# Patient Record
Sex: FEMALE | Race: WHITE | Employment: FULL TIME | URBAN - METROPOLITAN AREA
[De-identification: names, ages, dates, MRNs, and addresses within clinical notes are randomized per-mention and may not be internally consistent; named-entity substitution may affect disease eponyms.]

---

## 2018-11-12 LAB
AVERAGE GLUCOSE: NORMAL
AVERAGE GLUCOSE: NORMAL
HBA1C MFR BLD: 6.6 %
HBA1C MFR BLD: 6.6 %

## 2019-07-25 ENCOUNTER — OFFICE VISIT (OUTPATIENT)
Dept: PRIMARY CARE CLINIC | Age: 44
End: 2019-07-25
Payer: COMMERCIAL

## 2019-07-25 VITALS
WEIGHT: 248 LBS | HEART RATE: 95 BPM | DIASTOLIC BLOOD PRESSURE: 78 MMHG | BODY MASS INDEX: 35.5 KG/M2 | HEIGHT: 70 IN | OXYGEN SATURATION: 99 % | SYSTOLIC BLOOD PRESSURE: 124 MMHG

## 2019-07-25 DIAGNOSIS — E04.1 THYROID NODULE: ICD-10-CM

## 2019-07-25 DIAGNOSIS — D64.9 ANEMIA, UNSPECIFIED TYPE: ICD-10-CM

## 2019-07-25 DIAGNOSIS — E11.65 TYPE 2 DIABETES MELLITUS WITH HYPERGLYCEMIA, WITHOUT LONG-TERM CURRENT USE OF INSULIN (HCC): Primary | ICD-10-CM

## 2019-07-25 DIAGNOSIS — F41.9 ANXIETY AND DEPRESSION: ICD-10-CM

## 2019-07-25 DIAGNOSIS — E78.2 MIXED HYPERLIPIDEMIA: ICD-10-CM

## 2019-07-25 DIAGNOSIS — Z00.00 HEALTH MAINTENANCE EXAMINATION: ICD-10-CM

## 2019-07-25 DIAGNOSIS — E55.9 VITAMIN D DEFICIENCY: ICD-10-CM

## 2019-07-25 DIAGNOSIS — M06.4 INFLAMMATORY POLYARTHROPATHY (HCC): ICD-10-CM

## 2019-07-25 DIAGNOSIS — N20.0 CALCULUS, KIDNEY: ICD-10-CM

## 2019-07-25 DIAGNOSIS — G47.33 OSA (OBSTRUCTIVE SLEEP APNEA): ICD-10-CM

## 2019-07-25 DIAGNOSIS — K21.9 GASTROESOPHAGEAL REFLUX DISEASE WITHOUT ESOPHAGITIS: ICD-10-CM

## 2019-07-25 DIAGNOSIS — R94.5 LIVER FUNCTION STUDY, ABNORMAL: ICD-10-CM

## 2019-07-25 DIAGNOSIS — E03.9 HYPOTHYROIDISM, UNSPECIFIED TYPE: ICD-10-CM

## 2019-07-25 DIAGNOSIS — F32.A ANXIETY AND DEPRESSION: ICD-10-CM

## 2019-07-25 PROBLEM — K52.9 ACUTE GASTROENTERITIS: Status: ACTIVE | Noted: 2019-07-25

## 2019-07-25 PROCEDURE — 99215 OFFICE O/P EST HI 40 MIN: CPT | Performed by: FAMILY MEDICINE

## 2019-07-25 RX ORDER — METFORMIN HYDROCHLORIDE 500 MG/1
1000 TABLET, EXTENDED RELEASE ORAL 2 TIMES DAILY
COMMUNITY
End: 2019-07-25 | Stop reason: SDUPTHER

## 2019-07-25 RX ORDER — METFORMIN HYDROCHLORIDE 500 MG/1
1000 TABLET, EXTENDED RELEASE ORAL 2 TIMES DAILY
Qty: 120 TABLET | Refills: 3 | Status: SHIPPED | OUTPATIENT
Start: 2019-07-25 | End: 2020-01-02 | Stop reason: SDUPTHER

## 2019-07-25 RX ORDER — OMEPRAZOLE 20 MG/1
20 CAPSULE, DELAYED RELEASE ORAL DAILY
Qty: 30 CAPSULE | Refills: 3 | Status: SHIPPED | OUTPATIENT
Start: 2019-07-25 | End: 2020-01-02 | Stop reason: SDUPTHER

## 2019-07-25 RX ORDER — GLIPIZIDE 5 MG/1
5 TABLET, FILM COATED, EXTENDED RELEASE ORAL DAILY
COMMUNITY
End: 2019-07-25

## 2019-07-25 RX ORDER — OMEPRAZOLE 40 MG/1
40 CAPSULE, DELAYED RELEASE ORAL DAILY
Qty: 30 CAPSULE | Refills: 3 | Status: SHIPPED | OUTPATIENT
Start: 2019-07-25 | End: 2019-07-25

## 2019-07-25 RX ORDER — LEVOTHYROXINE SODIUM 0.07 MG/1
75 TABLET ORAL DAILY
Qty: 30 TABLET | Refills: 3 | Status: SHIPPED | OUTPATIENT
Start: 2019-07-25 | End: 2020-01-02 | Stop reason: SDUPTHER

## 2019-07-25 RX ORDER — DULOXETIN HYDROCHLORIDE 60 MG/1
60 CAPSULE, DELAYED RELEASE ORAL DAILY
Qty: 30 CAPSULE | Refills: 3 | Status: SHIPPED | OUTPATIENT
Start: 2019-07-25 | End: 2020-01-02 | Stop reason: SDUPTHER

## 2019-07-25 RX ORDER — METFORMIN HYDROCHLORIDE 500 MG/1
1000 TABLET, EXTENDED RELEASE ORAL 2 TIMES DAILY
Qty: 60 TABLET | Refills: 3 | Status: SHIPPED | OUTPATIENT
Start: 2019-07-25 | End: 2019-07-25

## 2019-07-25 RX ORDER — ACETAMINOPHEN 160 MG
2000 TABLET,DISINTEGRATING ORAL DAILY
COMMUNITY

## 2019-07-25 RX ORDER — DULOXETIN HYDROCHLORIDE 60 MG/1
60 CAPSULE, DELAYED RELEASE ORAL DAILY
COMMUNITY
End: 2019-07-25 | Stop reason: SDUPTHER

## 2019-07-25 RX ORDER — GLUCOSAMINE HCL/CHONDROITIN SU 500-400 MG
CAPSULE ORAL
Qty: 300 STRIP | Refills: 3 | Status: SHIPPED
Start: 2019-07-25 | End: 2020-04-22 | Stop reason: SDUPTHER

## 2019-07-25 NOTE — ASSESSMENT & PLAN NOTE
Counseled extensively. Differential reviewed, including serious etiologies. History of  thyroid nodule resected with \"various cells\" but ultimately deemed to be benign. She  had a thyroid ultrasound 10/18 showing \"two benign cysts\" and 2 lymph nodes \"not  overtly suspicious. She wanted to see Dr. Raysa Brown to be safe but never followed through. She says she will do so now.

## 2019-07-25 NOTE — ASSESSMENT & PLAN NOTE
Asymptomatic as long as taking therapy. She actually noticed being asymptomatic on 40 mg every other day and would like to lower to 20 mill grams daily now. However gets breakthrough off medication. Declines further testing and understands  risk of masking underlying etiologies. Risks of meds also reviewed incl RI/Electrolye  malabsorption. EGD 2011. Showed gerd .

## 2019-07-25 NOTE — ASSESSMENT & PLAN NOTE
failed mtx. did not tolerated. gi intolerance. bw neg. recommend cont per dr dick/rheumatology but declines follow-up now.   asx on cymbalta

## 2019-07-25 NOTE — PROGRESS NOTES
19  Roverto Precise : 1975 Sex: female  Age: 44 y.o. Chief Complaint   Patient presents with    Medication Refill       HPI  HPI:    Unfortunately has been unable to be compliant with fu's or diet. Lost insurance.  got sick. Due last feb. Did not get bw. Now going to get back on track. Knows a1c going to be off. Declines change in meds now. She has a number of stressors in her life, many of which have gotten better. Her  was hospitalized. It occurred shortly after getting insurance. Despite large bills insurance did kick in. He was not working for a while. Other people were supplying the groceries but now she is able to eat healthier again. Has their first family vacation planned but they had to cancel the previous vacation when her  was in the hospital.  Her mother was originally going on that medication but it was canceled. She felt bad so invited her mom on this vacation, afterward her  invited his parents who are not committing      Did not see Erin Jacobson yet. Says she will call back. Long overdue on GYN, over 10yrs. Emphasized need asap and she'll do on her own she said. Has decreased sex drive. Counseled extensively. Differential reviewed, including serious etiologies. New meds available reviewed. She'll defer to GYN  Review of Systems  ROS:  Const: Denies chills, fever, malaise and sweats. Eyes: Denies discharge, pain, redness and visual disturbance. ENMT: Denies earaches, other ear symptoms. Denies nasal or sinus symptoms other than stated  above. Denies mouth and tongue lesions and sore throat. CV: Denies chest discomfort, pain; diaphoresis, dizziness, edema, lightheadedness, orthopnea,  palpitations, syncope and near syncopal episode or any exertional symptoms  Resp: Denies cough, hemoptysis, pleuritic pain, SOB, sputum production and wheezing. GI: Denies abdominal pain, change in bowel habits, hematochezia, melena, nausea and vomiting.   : Denies urinary symptoms including dysuria , urgency, frequency or hematuria. Musculo: Denies musculoskeletal symptoms. Skin: Denies bruising and rash. Neuro: Denies headache, numbness, stiff neck, tingling and focal weakness slurred speech or facial  droop  Hema/Lymph: Denies bleeding/bruising tendency and enlarged lymph nodes        Current Outpatient Medications:     Cholecalciferol (VITAMIN D3) 2000 units CAPS, Take 2,000 Units by mouth, Disp: , Rfl:     DULoxetine (CYMBALTA) 60 MG extended release capsule, Take 1 capsule by mouth daily, Disp: 30 capsule, Rfl: 3    levothyroxine (SYNTHROID) 75 MCG tablet, Take 1 tablet by mouth Daily, Disp: 30 tablet, Rfl: 3    SITagliptin (JANUVIA) 100 MG tablet, Take 1 tablet by mouth daily, Disp: 30 tablet, Rfl: 3    blood glucose monitor strips, Test two times a day & as needed for symptoms of irregular blood glucose., Disp: 300 strip, Rfl: 3    metFORMIN (GLUCOPHAGE-XR) 500 MG extended release tablet, Take 2 tablets by mouth 2 times daily, Disp: 120 tablet, Rfl: 3    omeprazole (PRILOSEC) 20 MG delayed release capsule, Take 1 capsule by mouth daily, Disp: 30 capsule, Rfl: 3    albuterol (PROVENTIL HFA;VENTOLIN HFA) 108 (90 BASE) MCG/ACT inhaler, Inhale 2 puffs into the lungs every 6 hours as needed. , Disp: , Rfl:   Allergies   Allergen Reactions    Celecoxib Hives       Past Medical History:   Diagnosis Date    Depression     Diabetes mellitus ([address]     GERD (gastroesophageal reflux disease)     Joint pain     Thyroid disease      Past Surgical History:   Procedure Laterality Date     SECTION      CHOLECYSTECTOMY      HYSTERECTOMY       History reviewed. No pertinent family history.   Social History     Tobacco Use    Smoking status: Never Smoker    Smokeless tobacco: Never Used   Substance Use Topics    Alcohol use: No    Drug use: No      Social History     Social History Narrative    PMH:    Problem List: Eruption, Infestation by Sarcoptes are normoactive. Abdomen is soft, nontender, and nondistended. No  abdominal masses. No palpable hepatosplenomegaly. Lymph: No palpable or visible regional lymphadenopathy. Musculoskeletal: no acute joint inflammation. Skin: Dry and warm with no rash. Skin normal to inspection and palpation overall. Neuro: Alert and oriented. Affect: appropriate. Upper Extremities: 5/5 bilaterally. Lower Extremities:  5/5 bilaterally. Sensation intact to light touch. Reflexes: DTR's are symmetric and 2+ bilaterally. .  Cranial Nerves: Cranial nerves grossly intact. Assessment and Plan:   Diagnosis Orders   1. Type 2 diabetes mellitus with hyperglycemia, without long-term current use of insulin (HCC)  SITagliptin (JANUVIA) 100 MG tablet    blood glucose monitor strips    Comprehensive Metabolic Panel    Hemoglobin A1C    Urinalysis    Microalbumin / Creatinine Urine Ratio    Comprehensive Metabolic Panel    Hemoglobin A1C    Urinalysis    Microalbumin / Creatinine Urine Ratio    metFORMIN (GLUCOPHAGE-XR) 500 MG extended release tablet    DISCONTINUED: metFORMIN (GLUCOPHAGE-XR) 500 MG extended release tablet   2. Liver function study, abnormal  Comprehensive Metabolic Panel    Comprehensive Metabolic Panel   3. Thyroid nodule  levothyroxine (SYNTHROID) 75 MCG tablet    TSH without Reflex    TSH without Reflex   4. Hypothyroidism, unspecified type  levothyroxine (SYNTHROID) 75 MCG tablet    TSH without Reflex    TSH without Reflex   5. Health maintenance examination     6. Vitamin D deficiency     7. Inflammatory polyarthropathy ([address]     8. Anxiety and depression  DULoxetine (CYMBALTA) 60 MG extended release capsule   9. LEXIS (obstructive sleep apnea)     10. Anemia, unspecified type  CBC Auto Differential    CBC Auto Differential   11. Calculus, kidney     12. Mixed hyperlipidemia  Comprehensive Metabolic Panel    CK    Lipid Panel    Comprehensive Metabolic Panel    CK    Lipid Panel   13.  Gastroesophageal reflux disease without esophagitis  CBC Auto Differential    CBC Auto Differential    omeprazole (PRILOSEC) 20 MG delayed release capsule    DISCONTINUED: omeprazole (PRILOSEC) 40 MG delayed release capsule       Type 2 diabetes mellitus with hyperglycemia, without long-term current use of insulin (HCC)  Tolerating medications., On Januvia with precautions reviewed including lactic  acidosis diarrhea on metformin as well as pancreatic issues and thyroid cancer  Januvia. Was On Glucotrol with precautions including hypoglycemia. She since stopped it when ran out. Declines injections or referral now. Watch ambulatory. If out  of range, let us know. Stressed importance of daily foot examinations, regular eye  examinations etc. Micro-and macrovascular complications reviewed, hyper or  hypoglycemic precautions reviewed . cont lifestyle. Last A1C excellent 6.6, down  from 10. 2.but she expects it to be worse as she had been eating bad       Liver function study, abnormal  Counseled extensively. Differential reviewed, including serious etiologies. h/o fatty  liver. Precautions reviewed. Lifestyle modification appropriate diet and weight loss  reviewed. Risks of even this leading to cirrhosis reviewed. Other than basic  monitoring on interested in other evaluation or treatment, in-depth blood work,  imaging or otherwise. . normalized, HEP B and C neg (nonimmune and declines  immun)      Thyroid nodule  Counseled extensively. Differential reviewed, including serious etiologies. History of  thyroid nodule resected with \"various cells\" but ultimately deemed to be benign. She  had a thyroid ultrasound 10/18 showing \"two benign cysts\" and 2 lymph nodes \"not  overtly suspicious. She wanted to see Dr. Monse Torre to be safe but never followed through. She says she will do so now. Hypothyroidism  With history of partial left thyroidectomy. montior blood work. On Synthroid. Compliance with the Dr. Monse Torre referral reviewed.   Thyroid ultrasound 12/15 showed thyroid goiter , 3mm nodule without  suspicious features and partial left lobectomy . , repeat 10/18 as above. Health maintenance examination  Health maintenance issues discussed at length 9/18. Encouraged yearly physicals. Vitamin D deficiency  cont approp supplementation      Inflammatory polyarthropathy ([address]  failed mtx. did not tolerated. gi intolerance. bw neg. recommend cont per dr dick/rheumatology but declines follow-up now. asx on cymbalta      Anxiety and depression  Was doing well sertraline but because of the chronic pain we changed to Cymbalta   which is helping better with pain and she's doing well emotionally as well. LEXIS (obstructive sleep apnea)  Doing very well with CPAP. Uses 6-7 hours per night 7 days a week with great  benefit. Anemia  Chronically Low MCV, monitor. Iron and hemoglobin electrophoresis have been  normal. Counseled extensively. Differential reviewed, including serious etiologies. Declines further evaluation or treatment other than as noted. Notify me if changes  mind. Asymptomatic      Calculus, kidney  CAT scan showing nonobstructing kidney stone left, 1/16. Declines other evaluation  treatment now. Uric acid 3/16 negative. Proper hydration low oxalate diet. Declines  urology referral. asx. Mixed hyperlipidemia  higher with more nicholas/egss. declines meds. lifestyle modification reviewed. risk of  hyperlipidemia reviewed monitor  Await repeat    Gastroesophageal reflux disease without esophagitis  Asymptomatic as long as taking therapy. She actually noticed being asymptomatic on 40 mg every other day and would like to lower to 20 mill grams daily now. However gets breakthrough off medication. Declines further testing and understands  risk of masking underlying etiologies. Risks of meds also reviewed incl RI/Electrolye  malabsorption. EGD 2011. Showed gerd . No flowsheet data found. Plan as above.   Counseled extensively and differential diagnoses relevant to above were reviewed, including serious etiologies. Side effects and interactions of medications were reviewed. As long as symptoms steadily improve/resolve, and medical conditions follow the expected course, FU as below, sooner PRN. Return in about 3 months (around 10/25/2019), or if symptoms worsen or fail to improve. Signs and symptoms to watch for discussed, serious signs and symptoms reviewed. ER if any. Over 40 minutes  spent with the patient in reviewing records, reviewing with patient/family, counseling, ordering,  prescribing, completing h&p, etc., with over 50% of the time spent face to face counseling. Alejandra Hooker MD    Patients are advised to check with insurance company to ensure coverage and to fully understand benefits and cost prior to any testing. This note was created with the assistance of voice recognition software. Document was reviewed however may contain grammatical errors.

## 2019-07-26 ENCOUNTER — HOSPITAL ENCOUNTER (OUTPATIENT)
Age: 44
Discharge: HOME | End: 2019-07-28
Payer: COMMERCIAL

## 2019-07-26 DIAGNOSIS — R94.5 LIVER FUNCTION STUDY, ABNORMAL: ICD-10-CM

## 2019-07-26 DIAGNOSIS — E78.2 MIXED HYPERLIPIDEMIA: ICD-10-CM

## 2019-07-26 DIAGNOSIS — E11.65 TYPE 2 DIABETES MELLITUS WITH HYPERGLYCEMIA, WITHOUT LONG-TERM CURRENT USE OF INSULIN (HCC): ICD-10-CM

## 2019-07-26 DIAGNOSIS — K21.9 GASTROESOPHAGEAL REFLUX DISEASE WITHOUT ESOPHAGITIS: ICD-10-CM

## 2019-07-26 DIAGNOSIS — E03.9 HYPOTHYROIDISM, UNSPECIFIED TYPE: ICD-10-CM

## 2019-07-26 DIAGNOSIS — E04.1 THYROID NODULE: ICD-10-CM

## 2019-07-26 DIAGNOSIS — D64.9 ANEMIA, UNSPECIFIED TYPE: ICD-10-CM

## 2019-07-26 LAB
ALBUMIN SERPL-MCNC: 4.3 G/DL (ref 3.5–5.2)
ALP BLD-CCNC: 97 U/L (ref 35–104)
ALT SERPL-CCNC: 35 U/L (ref 0–32)
AMORPHOUS: ABNORMAL
ANION GAP SERPL CALCULATED.3IONS-SCNC: 13 MMOL/L (ref 7–16)
AST SERPL-CCNC: 26 U/L (ref 0–31)
BACTERIA: ABNORMAL /HPF
BASOPHILS ABSOLUTE: 0.04 E9/L (ref 0–0.2)
BASOPHILS RELATIVE PERCENT: 0.5 % (ref 0–2)
BILIRUB SERPL-MCNC: 0.7 MG/DL (ref 0–1.2)
BILIRUBIN URINE: NEGATIVE
BLOOD, URINE: NEGATIVE
BUN BLDV-MCNC: 10 MG/DL (ref 6–20)
CALCIUM SERPL-MCNC: 9.6 MG/DL (ref 8.6–10.2)
CHLORIDE BLD-SCNC: 100 MMOL/L (ref 98–107)
CHOLESTEROL, TOTAL: 207 MG/DL (ref 0–199)
CLARITY: ABNORMAL
CO2: 26 MMOL/L (ref 22–29)
COLOR: YELLOW
CREAT SERPL-MCNC: 0.6 MG/DL (ref 0.5–1)
CREATININE URINE: 242 MG/DL (ref 29–226)
EOSINOPHILS ABSOLUTE: 0.08 E9/L (ref 0.05–0.5)
EOSINOPHILS RELATIVE PERCENT: 0.9 % (ref 0–6)
GFR AFRICAN AMERICAN: >60
GFR NON-AFRICAN AMERICAN: >60 ML/MIN/1.73
GLUCOSE BLD-MCNC: 232 MG/DL (ref 74–99)
GLUCOSE URINE: 500 MG/DL
HBA1C MFR BLD: 9.5 % (ref 4–5.6)
HCT VFR BLD CALC: 46 % (ref 34–48)
HDLC SERPL-MCNC: 51 MG/DL
HEMOGLOBIN: 14.3 G/DL (ref 11.5–15.5)
IMMATURE GRANULOCYTES #: 0.02 E9/L
IMMATURE GRANULOCYTES %: 0.2 % (ref 0–5)
KETONES, URINE: NEGATIVE MG/DL
LDL CHOLESTEROL CALCULATED: 116 MG/DL (ref 0–99)
LEUKOCYTE ESTERASE, URINE: ABNORMAL
LYMPHOCYTES ABSOLUTE: 2.48 E9/L (ref 1.5–4)
LYMPHOCYTES RELATIVE PERCENT: 29.3 % (ref 20–42)
MCH RBC QN AUTO: 25.4 PG (ref 26–35)
MCHC RBC AUTO-ENTMCNC: 31.1 % (ref 32–34.5)
MCV RBC AUTO: 81.9 FL (ref 80–99.9)
MICROALBUMIN UR-MCNC: 15.3 MG/L
MICROALBUMIN/CREAT UR-RTO: 6.3 (ref 0–30)
MONOCYTES ABSOLUTE: 0.68 E9/L (ref 0.1–0.95)
MONOCYTES RELATIVE PERCENT: 8 % (ref 2–12)
NEUTROPHILS ABSOLUTE: 5.15 E9/L (ref 1.8–7.3)
NEUTROPHILS RELATIVE PERCENT: 61.1 % (ref 43–80)
NITRITE, URINE: POSITIVE
PDW BLD-RTO: 15.4 FL (ref 11.5–15)
PH UA: 5.5 (ref 5–9)
PLATELET # BLD: 323 E9/L (ref 130–450)
PMV BLD AUTO: 9.5 FL (ref 7–12)
POTASSIUM SERPL-SCNC: 4 MMOL/L (ref 3.5–5)
PROTEIN UA: NEGATIVE MG/DL
RBC # BLD: 5.62 E12/L (ref 3.5–5.5)
RBC UA: ABNORMAL /HPF (ref 0–2)
SODIUM BLD-SCNC: 139 MMOL/L (ref 132–146)
SPECIFIC GRAVITY UA: >=1.03 (ref 1–1.03)
TOTAL CK: 76 U/L (ref 20–180)
TOTAL PROTEIN: 7.8 G/DL (ref 6.4–8.3)
TRIGL SERPL-MCNC: 202 MG/DL (ref 0–149)
TSH SERPL DL<=0.05 MIU/L-ACNC: 1.24 UIU/ML (ref 0.27–4.2)
UROBILINOGEN, URINE: 0.2 E.U./DL
VLDLC SERPL CALC-MCNC: 40 MG/DL
WBC # BLD: 8.5 E9/L (ref 4.5–11.5)
WBC UA: ABNORMAL /HPF (ref 0–5)

## 2019-07-26 PROCEDURE — 81001 URINALYSIS AUTO W/SCOPE: CPT

## 2019-07-26 PROCEDURE — 36415 COLL VENOUS BLD VENIPUNCTURE: CPT

## 2019-07-26 PROCEDURE — 83036 HEMOGLOBIN GLYCOSYLATED A1C: CPT

## 2019-07-26 PROCEDURE — 85025 COMPLETE CBC W/AUTO DIFF WBC: CPT

## 2019-07-26 PROCEDURE — 80053 COMPREHEN METABOLIC PANEL: CPT

## 2019-07-26 PROCEDURE — 84443 ASSAY THYROID STIM HORMONE: CPT

## 2019-07-26 PROCEDURE — 82044 UR ALBUMIN SEMIQUANTITATIVE: CPT

## 2019-07-26 PROCEDURE — 82570 ASSAY OF URINE CREATININE: CPT

## 2019-07-26 PROCEDURE — 82550 ASSAY OF CK (CPK): CPT

## 2019-07-26 PROCEDURE — 80061 LIPID PANEL: CPT

## 2019-07-29 ENCOUNTER — TELEPHONE (OUTPATIENT)
Dept: PRIMARY CARE CLINIC | Age: 44
End: 2019-07-29

## 2019-07-29 DIAGNOSIS — N30.00 ACUTE CYSTITIS WITHOUT HEMATURIA: Primary | ICD-10-CM

## 2019-07-29 RX ORDER — CEPHALEXIN 500 MG/1
500 CAPSULE ORAL 2 TIMES DAILY
Qty: 14 CAPSULE | Refills: 0 | Status: SHIPPED | OUTPATIENT
Start: 2019-07-29 | End: 2019-08-05

## 2019-08-05 ENCOUNTER — TELEPHONE (OUTPATIENT)
Dept: PRIMARY CARE CLINIC | Age: 44
End: 2019-08-05

## 2019-08-05 RX ORDER — FLUCONAZOLE 150 MG/1
TABLET ORAL
Qty: 2 TABLET | Refills: 0 | Status: SHIPPED | OUTPATIENT
Start: 2019-08-05 | End: 2020-01-30

## 2019-08-24 PROBLEM — Z00.00 HEALTH MAINTENANCE EXAMINATION: Status: RESOLVED | Noted: 2019-07-25 | Resolved: 2019-08-24

## 2019-10-17 ENCOUNTER — HOSPITAL ENCOUNTER (OUTPATIENT)
Age: 44
Discharge: HOME | End: 2019-10-19
Payer: COMMERCIAL

## 2019-10-17 ENCOUNTER — APPOINTMENT (OUTPATIENT)
Dept: CT IMAGING | Age: 44
End: 2019-10-17
Payer: COMMERCIAL

## 2019-10-17 ENCOUNTER — OFFICE VISIT (OUTPATIENT)
Dept: FAMILY MEDICINE CLINIC | Age: 44
End: 2019-10-17
Payer: COMMERCIAL

## 2019-10-17 ENCOUNTER — HOSPITAL ENCOUNTER (EMERGENCY)
Age: 44
Discharge: HOME | End: 2019-10-17
Attending: EMERGENCY MEDICINE
Payer: COMMERCIAL

## 2019-10-17 VITALS
RESPIRATION RATE: 18 BRPM | WEIGHT: 249 LBS | TEMPERATURE: 97.5 F | DIASTOLIC BLOOD PRESSURE: 70 MMHG | BODY MASS INDEX: 35.73 KG/M2 | HEART RATE: 103 BPM | SYSTOLIC BLOOD PRESSURE: 108 MMHG | OXYGEN SATURATION: 98 %

## 2019-10-17 VITALS
OXYGEN SATURATION: 99 % | HEIGHT: 70 IN | RESPIRATION RATE: 20 BRPM | SYSTOLIC BLOOD PRESSURE: 115 MMHG | DIASTOLIC BLOOD PRESSURE: 72 MMHG | HEART RATE: 87 BPM | BODY MASS INDEX: 35.65 KG/M2 | TEMPERATURE: 98.3 F | WEIGHT: 249 LBS

## 2019-10-17 DIAGNOSIS — N30.00 ACUTE CYSTITIS WITHOUT HEMATURIA: ICD-10-CM

## 2019-10-17 DIAGNOSIS — E04.1 THYROID NODULE: ICD-10-CM

## 2019-10-17 DIAGNOSIS — E11.65 TYPE 2 DIABETES MELLITUS WITH HYPERGLYCEMIA, WITHOUT LONG-TERM CURRENT USE OF INSULIN (HCC): ICD-10-CM

## 2019-10-17 DIAGNOSIS — R51.9 INTRACTABLE HEADACHE, UNSPECIFIED CHRONICITY PATTERN, UNSPECIFIED HEADACHE TYPE: Primary | ICD-10-CM

## 2019-10-17 DIAGNOSIS — R51.9 NONINTRACTABLE HEADACHE, UNSPECIFIED CHRONICITY PATTERN, UNSPECIFIED HEADACHE TYPE: Primary | ICD-10-CM

## 2019-10-17 DIAGNOSIS — R94.5 LIVER FUNCTION STUDY, ABNORMAL: ICD-10-CM

## 2019-10-17 DIAGNOSIS — R42 VERTIGO: ICD-10-CM

## 2019-10-17 DIAGNOSIS — E03.9 HYPOTHYROIDISM, UNSPECIFIED TYPE: ICD-10-CM

## 2019-10-17 DIAGNOSIS — D64.9 ANEMIA, UNSPECIFIED TYPE: ICD-10-CM

## 2019-10-17 DIAGNOSIS — E78.2 MIXED HYPERLIPIDEMIA: ICD-10-CM

## 2019-10-17 DIAGNOSIS — R42 DIZZINESS: ICD-10-CM

## 2019-10-17 DIAGNOSIS — R26.9 GAIT DISTURBANCE: ICD-10-CM

## 2019-10-17 DIAGNOSIS — K21.9 GASTROESOPHAGEAL REFLUX DISEASE WITHOUT ESOPHAGITIS: ICD-10-CM

## 2019-10-17 LAB
ALBUMIN SERPL-MCNC: 3.9 G/DL (ref 3.5–5.2)
ALBUMIN SERPL-MCNC: 4.4 G/DL (ref 3.5–5.2)
ALP BLD-CCNC: 90 U/L (ref 35–104)
ALP BLD-CCNC: 96 U/L (ref 35–104)
ALT SERPL-CCNC: 38 U/L (ref 0–32)
ALT SERPL-CCNC: 38 U/L (ref 0–32)
AMORPHOUS: NORMAL
ANION GAP SERPL CALCULATED.3IONS-SCNC: 10 MMOL/L (ref 7–16)
ANION GAP SERPL CALCULATED.3IONS-SCNC: 19 MMOL/L (ref 7–16)
AST SERPL-CCNC: 30 U/L (ref 0–31)
AST SERPL-CCNC: 33 U/L (ref 0–31)
BACTERIA: NORMAL /HPF
BASOPHILS ABSOLUTE: 0.04 E9/L (ref 0–0.2)
BASOPHILS ABSOLUTE: 0.06 E9/L (ref 0–0.2)
BASOPHILS RELATIVE PERCENT: 0.4 % (ref 0–2)
BASOPHILS RELATIVE PERCENT: 0.7 % (ref 0–2)
BILIRUB SERPL-MCNC: 0.7 MG/DL (ref 0–1.2)
BILIRUB SERPL-MCNC: 0.8 MG/DL (ref 0–1.2)
BILIRUBIN URINE: NEGATIVE
BLOOD, URINE: NEGATIVE
BUN BLDV-MCNC: 7 MG/DL (ref 6–20)
BUN BLDV-MCNC: 8 MG/DL (ref 6–20)
CALCIUM SERPL-MCNC: 9.1 MG/DL (ref 8.6–10.2)
CALCIUM SERPL-MCNC: 9.7 MG/DL (ref 8.6–10.2)
CHLORIDE BLD-SCNC: 100 MMOL/L (ref 98–107)
CHLORIDE BLD-SCNC: 102 MMOL/L (ref 98–107)
CHOLESTEROL, TOTAL: 214 MG/DL (ref 0–199)
CLARITY: ABNORMAL
CO2: 19 MMOL/L (ref 22–29)
CO2: 24 MMOL/L (ref 22–29)
COLOR: YELLOW
CREAT SERPL-MCNC: 0.5 MG/DL (ref 0.5–1)
CREAT SERPL-MCNC: 0.6 MG/DL (ref 0.5–1)
CREATININE URINE: 176 MG/DL (ref 29–226)
EOSINOPHILS ABSOLUTE: 0.08 E9/L (ref 0.05–0.5)
EOSINOPHILS ABSOLUTE: 0.09 E9/L (ref 0.05–0.5)
EOSINOPHILS RELATIVE PERCENT: 0.8 % (ref 0–6)
EOSINOPHILS RELATIVE PERCENT: 1 % (ref 0–6)
GFR AFRICAN AMERICAN: >60
GFR AFRICAN AMERICAN: >60
GFR NON-AFRICAN AMERICAN: >60 ML/MIN/1.73
GFR NON-AFRICAN AMERICAN: >60 ML/MIN/1.73
GLUCOSE BLD-MCNC: 180 MG/DL (ref 74–99)
GLUCOSE BLD-MCNC: 241 MG/DL (ref 74–99)
GLUCOSE URINE: >=1000 MG/DL
HBA1C MFR BLD: 10.2 % (ref 4–5.6)
HCT VFR BLD CALC: 43.5 % (ref 34–48)
HCT VFR BLD CALC: 47.2 % (ref 34–48)
HDLC SERPL-MCNC: 56 MG/DL
HEMOGLOBIN: 13.8 G/DL (ref 11.5–15.5)
HEMOGLOBIN: 14.3 G/DL (ref 11.5–15.5)
IMMATURE GRANULOCYTES #: 0.03 E9/L
IMMATURE GRANULOCYTES #: 0.03 E9/L
IMMATURE GRANULOCYTES %: 0.3 % (ref 0–5)
IMMATURE GRANULOCYTES %: 0.3 % (ref 0–5)
KETONES, URINE: ABNORMAL MG/DL
LDL CHOLESTEROL CALCULATED: 122 MG/DL (ref 0–99)
LEUKOCYTE ESTERASE, URINE: NEGATIVE
LYMPHOCYTES ABSOLUTE: 3.61 E9/L (ref 1.5–4)
LYMPHOCYTES ABSOLUTE: 3.71 E9/L (ref 1.5–4)
LYMPHOCYTES RELATIVE PERCENT: 37.6 % (ref 20–42)
LYMPHOCYTES RELATIVE PERCENT: 40.5 % (ref 20–42)
MCH RBC QN AUTO: 24.7 PG (ref 26–35)
MCH RBC QN AUTO: 25.4 PG (ref 26–35)
MCHC RBC AUTO-ENTMCNC: 30.3 % (ref 32–34.5)
MCHC RBC AUTO-ENTMCNC: 31.7 % (ref 32–34.5)
MCV RBC AUTO: 80 FL (ref 80–99.9)
MCV RBC AUTO: 81.4 FL (ref 80–99.9)
MICROALBUMIN UR-MCNC: 20.5 MG/L
MICROALBUMIN/CREAT UR-RTO: 11.6 (ref 0–30)
MONOCYTES ABSOLUTE: 0.65 E9/L (ref 0.1–0.95)
MONOCYTES ABSOLUTE: 0.83 E9/L (ref 0.1–0.95)
MONOCYTES RELATIVE PERCENT: 7.1 % (ref 2–12)
MONOCYTES RELATIVE PERCENT: 8.7 % (ref 2–12)
NEUTROPHILS ABSOLUTE: 4.61 E9/L (ref 1.8–7.3)
NEUTROPHILS ABSOLUTE: 5 E9/L (ref 1.8–7.3)
NEUTROPHILS RELATIVE PERCENT: 50.4 % (ref 43–80)
NEUTROPHILS RELATIVE PERCENT: 52.2 % (ref 43–80)
NITRITE, URINE: NEGATIVE
PDW BLD-RTO: 15.4 FL (ref 11.5–15)
PDW BLD-RTO: 15.6 FL (ref 11.5–15)
PH UA: 6 (ref 5–9)
PLATELET # BLD: 293 E9/L (ref 130–450)
PLATELET # BLD: 350 E9/L (ref 130–450)
PMV BLD AUTO: 10 FL (ref 7–12)
PMV BLD AUTO: 9.4 FL (ref 7–12)
POTASSIUM REFLEX MAGNESIUM: 4.2 MMOL/L (ref 3.5–5)
POTASSIUM SERPL-SCNC: 4.2 MMOL/L (ref 3.5–5)
PROTEIN UA: NEGATIVE MG/DL
RBC # BLD: 5.44 E12/L (ref 3.5–5.5)
RBC # BLD: 5.8 E12/L (ref 3.5–5.5)
RBC UA: NORMAL /HPF (ref 0–2)
SODIUM BLD-SCNC: 134 MMOL/L (ref 132–146)
SODIUM BLD-SCNC: 140 MMOL/L (ref 132–146)
SPECIFIC GRAVITY UA: >=1.03 (ref 1–1.03)
TOTAL CK: 73 U/L (ref 20–180)
TOTAL PROTEIN: 7.6 G/DL (ref 6.4–8.3)
TOTAL PROTEIN: 8 G/DL (ref 6.4–8.3)
TRIGL SERPL-MCNC: 178 MG/DL (ref 0–149)
TSH SERPL DL<=0.05 MIU/L-ACNC: 1.29 UIU/ML (ref 0.27–4.2)
UROBILINOGEN, URINE: 0.2 E.U./DL
VLDLC SERPL CALC-MCNC: 36 MG/DL
WBC # BLD: 9.2 E9/L (ref 4.5–11.5)
WBC # BLD: 9.6 E9/L (ref 4.5–11.5)
WBC UA: NORMAL /HPF (ref 0–5)

## 2019-10-17 PROCEDURE — 82570 ASSAY OF URINE CREATININE: CPT

## 2019-10-17 PROCEDURE — 80053 COMPREHEN METABOLIC PANEL: CPT

## 2019-10-17 PROCEDURE — 36415 COLL VENOUS BLD VENIPUNCTURE: CPT

## 2019-10-17 PROCEDURE — 2580000003 HC RX 258: Performed by: NURSE PRACTITIONER

## 2019-10-17 PROCEDURE — 87088 URINE BACTERIA CULTURE: CPT

## 2019-10-17 PROCEDURE — 83036 HEMOGLOBIN GLYCOSYLATED A1C: CPT

## 2019-10-17 PROCEDURE — 6370000000 HC RX 637 (ALT 250 FOR IP): Performed by: NURSE PRACTITIONER

## 2019-10-17 PROCEDURE — 82550 ASSAY OF CK (CPK): CPT

## 2019-10-17 PROCEDURE — 96374 THER/PROPH/DIAG INJ IV PUSH: CPT

## 2019-10-17 PROCEDURE — 70450 CT HEAD/BRAIN W/O DYE: CPT

## 2019-10-17 PROCEDURE — 96375 TX/PRO/DX INJ NEW DRUG ADDON: CPT

## 2019-10-17 PROCEDURE — 6360000002 HC RX W HCPCS: Performed by: NURSE PRACTITIONER

## 2019-10-17 PROCEDURE — 99284 EMERGENCY DEPT VISIT MOD MDM: CPT

## 2019-10-17 PROCEDURE — 93005 ELECTROCARDIOGRAM TRACING: CPT | Performed by: NURSE PRACTITIONER

## 2019-10-17 PROCEDURE — 85025 COMPLETE CBC W/AUTO DIFF WBC: CPT

## 2019-10-17 PROCEDURE — 81001 URINALYSIS AUTO W/SCOPE: CPT

## 2019-10-17 PROCEDURE — 80061 LIPID PANEL: CPT

## 2019-10-17 PROCEDURE — 84443 ASSAY THYROID STIM HORMONE: CPT

## 2019-10-17 PROCEDURE — 99214 OFFICE O/P EST MOD 30 MIN: CPT | Performed by: PHYSICIAN ASSISTANT

## 2019-10-17 PROCEDURE — 82044 UR ALBUMIN SEMIQUANTITATIVE: CPT

## 2019-10-17 RX ORDER — KETOROLAC TROMETHAMINE 30 MG/ML
15 INJECTION, SOLUTION INTRAMUSCULAR; INTRAVENOUS ONCE
Status: COMPLETED | OUTPATIENT
Start: 2019-10-17 | End: 2019-10-17

## 2019-10-17 RX ORDER — METOCLOPRAMIDE HYDROCHLORIDE 5 MG/ML
10 INJECTION INTRAMUSCULAR; INTRAVENOUS ONCE
Status: COMPLETED | OUTPATIENT
Start: 2019-10-17 | End: 2019-10-17

## 2019-10-17 RX ORDER — 0.9 % SODIUM CHLORIDE 0.9 %
1000 INTRAVENOUS SOLUTION INTRAVENOUS ONCE
Status: COMPLETED | OUTPATIENT
Start: 2019-10-17 | End: 2019-10-17

## 2019-10-17 RX ORDER — MECLIZINE HCL 12.5 MG/1
25 TABLET ORAL ONCE
Status: COMPLETED | OUTPATIENT
Start: 2019-10-17 | End: 2019-10-17

## 2019-10-17 RX ORDER — DIPHENHYDRAMINE HYDROCHLORIDE 50 MG/ML
25 INJECTION INTRAMUSCULAR; INTRAVENOUS ONCE
Status: DISCONTINUED | OUTPATIENT
Start: 2019-10-17 | End: 2019-10-17

## 2019-10-17 RX ORDER — MECLIZINE HYDROCHLORIDE 25 MG/1
25 TABLET ORAL 3 TIMES DAILY PRN
Qty: 21 TABLET | Refills: 0 | Status: SHIPPED | OUTPATIENT
Start: 2019-10-17 | End: 2019-10-24

## 2019-10-17 RX ORDER — KETOROLAC TROMETHAMINE 30 MG/ML
15 INJECTION, SOLUTION INTRAMUSCULAR; INTRAVENOUS ONCE
Status: DISCONTINUED | OUTPATIENT
Start: 2019-10-17 | End: 2019-10-17

## 2019-10-17 RX ORDER — METHYLPREDNISOLONE SODIUM SUCCINATE 125 MG/2ML
125 INJECTION, POWDER, LYOPHILIZED, FOR SOLUTION INTRAMUSCULAR; INTRAVENOUS ONCE
Status: DISCONTINUED | OUTPATIENT
Start: 2019-10-17 | End: 2019-10-17

## 2019-10-17 RX ORDER — DIPHENHYDRAMINE HYDROCHLORIDE 50 MG/ML
25 INJECTION INTRAMUSCULAR; INTRAVENOUS ONCE
Status: COMPLETED | OUTPATIENT
Start: 2019-10-17 | End: 2019-10-17

## 2019-10-17 RX ORDER — PROCHLORPERAZINE EDISYLATE 5 MG/ML
10 INJECTION INTRAMUSCULAR; INTRAVENOUS ONCE
Status: DISCONTINUED | OUTPATIENT
Start: 2019-10-17 | End: 2019-10-17

## 2019-10-17 RX ADMIN — METOCLOPRAMIDE 10 MG: 5 INJECTION, SOLUTION INTRAMUSCULAR; INTRAVENOUS at 17:15

## 2019-10-17 RX ADMIN — MECLIZINE 25 MG: 12.5 TABLET ORAL at 19:28

## 2019-10-17 RX ADMIN — KETOROLAC TROMETHAMINE 30 MG: 30 INJECTION, SOLUTION INTRAMUSCULAR at 17:14

## 2019-10-17 RX ADMIN — SODIUM CHLORIDE 1000 ML: 9 INJECTION, SOLUTION INTRAVENOUS at 17:08

## 2019-10-17 RX ADMIN — DIPHENHYDRAMINE HYDROCHLORIDE 25 MG: 50 INJECTION, SOLUTION INTRAMUSCULAR; INTRAVENOUS at 17:17

## 2019-10-17 ASSESSMENT — PAIN DESCRIPTION - FREQUENCY: FREQUENCY: CONTINUOUS

## 2019-10-17 ASSESSMENT — PAIN SCALES - GENERAL
PAINLEVEL_OUTOF10: 8
PAINLEVEL_OUTOF10: 4
PAINLEVEL_OUTOF10: 4

## 2019-10-17 ASSESSMENT — PAIN DESCRIPTION - ONSET: ONSET: GRADUAL

## 2019-10-17 ASSESSMENT — PAIN - FUNCTIONAL ASSESSMENT: PAIN_FUNCTIONAL_ASSESSMENT: ACTIVITIES ARE NOT PREVENTED

## 2019-10-17 ASSESSMENT — PAIN DESCRIPTION - PAIN TYPE: TYPE: ACUTE PAIN

## 2019-10-17 ASSESSMENT — PAIN DESCRIPTION - LOCATION: LOCATION: HEAD

## 2019-10-17 ASSESSMENT — PAIN DESCRIPTION - PROGRESSION: CLINICAL_PROGRESSION: NOT CHANGED

## 2019-10-17 ASSESSMENT — PAIN DESCRIPTION - DESCRIPTORS: DESCRIPTORS: ACHING

## 2019-10-17 ASSESSMENT — PAIN DESCRIPTION - ORIENTATION: ORIENTATION: LEFT

## 2019-10-18 LAB
EKG ATRIAL RATE: 92 BPM
EKG P AXIS: 59 DEGREES
EKG P-R INTERVAL: 160 MS
EKG Q-T INTERVAL: 382 MS
EKG QRS DURATION: 94 MS
EKG QTC CALCULATION (BAZETT): 472 MS
EKG R AXIS: 16 DEGREES
EKG T AXIS: 57 DEGREES
EKG VENTRICULAR RATE: 92 BPM

## 2019-10-18 PROCEDURE — 93010 ELECTROCARDIOGRAM REPORT: CPT | Performed by: INTERNAL MEDICINE

## 2019-10-19 LAB — URINE CULTURE, ROUTINE: NORMAL

## 2019-11-13 ENCOUNTER — OFFICE VISIT (OUTPATIENT)
Dept: FAMILY MEDICINE CLINIC | Age: 44
End: 2019-11-13
Payer: COMMERCIAL

## 2019-11-13 ENCOUNTER — HOSPITAL ENCOUNTER (OUTPATIENT)
Age: 44
Discharge: HOME | End: 2019-11-15
Payer: COMMERCIAL

## 2019-11-13 VITALS
HEIGHT: 70 IN | DIASTOLIC BLOOD PRESSURE: 82 MMHG | BODY MASS INDEX: 36.13 KG/M2 | HEART RATE: 100 BPM | RESPIRATION RATE: 20 BRPM | TEMPERATURE: 98.2 F | OXYGEN SATURATION: 99 % | SYSTOLIC BLOOD PRESSURE: 124 MMHG | WEIGHT: 252.4 LBS

## 2019-11-13 DIAGNOSIS — R30.0 DYSURIA: ICD-10-CM

## 2019-11-13 DIAGNOSIS — N30.01 ACUTE CYSTITIS WITH HEMATURIA: ICD-10-CM

## 2019-11-13 DIAGNOSIS — R30.0 DYSURIA: Primary | ICD-10-CM

## 2019-11-13 LAB
BILIRUBIN, POC: NEGATIVE
BLOOD URINE, POC: NORMAL
CLARITY, POC: NORMAL
COLOR, POC: YELLOW
GLUCOSE URINE, POC: 500
KETONES, POC: NEGATIVE
LEUKOCYTE EST, POC: NORMAL
NITRITE, POC: NEGATIVE
PH, POC: 5.5
PROTEIN, POC: NEGATIVE
SPECIFIC GRAVITY, POC: 1.01
UROBILINOGEN, POC: 0.2

## 2019-11-13 PROCEDURE — 99214 OFFICE O/P EST MOD 30 MIN: CPT | Performed by: PHYSICIAN ASSISTANT

## 2019-11-13 PROCEDURE — 87088 URINE BACTERIA CULTURE: CPT

## 2019-11-13 PROCEDURE — 81002 URINALYSIS NONAUTO W/O SCOPE: CPT | Performed by: PHYSICIAN ASSISTANT

## 2019-11-13 PROCEDURE — 87077 CULTURE AEROBIC IDENTIFY: CPT

## 2019-11-13 PROCEDURE — 87186 SC STD MICRODIL/AGAR DIL: CPT

## 2019-11-13 RX ORDER — CEFDINIR 300 MG/1
300 CAPSULE ORAL 2 TIMES DAILY
Qty: 14 CAPSULE | Refills: 0 | Status: SHIPPED | OUTPATIENT
Start: 2019-11-13 | End: 2019-11-15 | Stop reason: ALTCHOICE

## 2019-11-15 LAB
ORGANISM: ABNORMAL
URINE CULTURE, ROUTINE: ABNORMAL

## 2019-11-15 RX ORDER — SULFAMETHOXAZOLE AND TRIMETHOPRIM 800; 160 MG/1; MG/1
1 TABLET ORAL 2 TIMES DAILY
Qty: 14 TABLET | Refills: 0 | Status: SHIPPED | OUTPATIENT
Start: 2019-11-15 | End: 2019-11-22

## 2019-11-21 ENCOUNTER — TELEPHONE (OUTPATIENT)
Dept: PRIMARY CARE CLINIC | Age: 44
End: 2019-11-21

## 2019-11-21 RX ORDER — FLUCONAZOLE 150 MG/1
150 TABLET ORAL ONCE
Qty: 1 TABLET | Refills: 0 | Status: SHIPPED | OUTPATIENT
Start: 2019-11-21 | End: 2019-11-21

## 2020-01-02 RX ORDER — METFORMIN HYDROCHLORIDE 500 MG/1
1000 TABLET, EXTENDED RELEASE ORAL 2 TIMES DAILY
Qty: 120 TABLET | Refills: 3 | Status: SHIPPED
Start: 2020-01-02 | End: 2020-04-22 | Stop reason: SDUPTHER

## 2020-01-02 RX ORDER — OMEPRAZOLE 20 MG/1
20 CAPSULE, DELAYED RELEASE ORAL DAILY
Qty: 30 CAPSULE | Refills: 3 | Status: SHIPPED
Start: 2020-01-02 | End: 2020-04-22 | Stop reason: SDUPTHER

## 2020-01-02 RX ORDER — DULOXETIN HYDROCHLORIDE 60 MG/1
60 CAPSULE, DELAYED RELEASE ORAL DAILY
Qty: 30 CAPSULE | Refills: 3 | Status: SHIPPED
Start: 2020-01-02 | End: 2020-04-22 | Stop reason: SDUPTHER

## 2020-01-02 RX ORDER — LEVOTHYROXINE SODIUM 0.07 MG/1
75 TABLET ORAL DAILY
Qty: 30 TABLET | Refills: 3 | Status: SHIPPED
Start: 2020-01-02 | End: 2020-04-22 | Stop reason: SDUPTHER

## 2020-01-30 ENCOUNTER — OFFICE VISIT (OUTPATIENT)
Dept: PRIMARY CARE CLINIC | Age: 45
End: 2020-01-30
Payer: COMMERCIAL

## 2020-01-30 VITALS
OXYGEN SATURATION: 97 % | SYSTOLIC BLOOD PRESSURE: 124 MMHG | HEART RATE: 101 BPM | DIASTOLIC BLOOD PRESSURE: 60 MMHG | BODY MASS INDEX: 36.01 KG/M2 | WEIGHT: 251 LBS

## 2020-01-30 PROBLEM — B37.31 VAGINAL CANDIDA: Status: ACTIVE | Noted: 2020-01-30

## 2020-01-30 LAB
CHP ED QC CHECK: NORMAL
GLUCOSE BLD-MCNC: 275 MG/DL
HBA1C MFR BLD: 9.9 %

## 2020-01-30 PROCEDURE — 99214 OFFICE O/P EST MOD 30 MIN: CPT | Performed by: FAMILY MEDICINE

## 2020-01-30 PROCEDURE — 82962 GLUCOSE BLOOD TEST: CPT | Performed by: FAMILY MEDICINE

## 2020-01-30 PROCEDURE — 83036 HEMOGLOBIN GLYCOSYLATED A1C: CPT | Performed by: FAMILY MEDICINE

## 2020-01-30 RX ORDER — FLUCONAZOLE 150 MG/1
TABLET ORAL
Qty: 2 TABLET | Refills: 0 | Status: SHIPPED
Start: 2020-01-30 | End: 2020-02-25

## 2020-01-30 NOTE — PROGRESS NOTES
19  Debbie Gamma : 1975 Sex: female  Age: 38 y.o. Chief Complaint   Patient presents with    Medication Refill       HPI  HPI:    Patient presents today for follow-up. Is been noncompliant including with follow-ups and lifestyle. She had blood work in July and October. ALT went from 35-38 with known fatty liver triglyceride 202 to 178  127 microalbumin 11.6 hemoglobin A1c 9.5-10.2-9.9, prior was better. Count successfully. Has not seen Dr. Sven Rivera yet. Had pneumovax    Long overdue on GYN, over 10yrs. Emphasized need asap and she'll do on her own she said. Has decreased sex drive. Counseled extensively. Differential reviewed, including serious etiologies. New meds available reviewed. She'll defer to GYN    Feel she has a yeast infection with vaginal itching.   Defers exam but would like empiric treatment      Most Recent Labs  CBC  Lab Results   Component Value Date    WBC 9.6 10/17/2019    WBC 9.2 10/17/2019    WBC 8.5 2019    RBC 5.44 10/17/2019    RBC 5.80 10/17/2019    RBC 5.62 2019    HGB 13.8 10/17/2019    HGB 14.3 10/17/2019    HGB 14.3 2019    HCT 43.5 10/17/2019    HCT 47.2 10/17/2019    HCT 46.0 2019    MCV 80.0 10/17/2019    MCV 81.4 10/17/2019    MCV 81.9 2019     10/17/2019     10/17/2019     2019      CMP  Lab Results   Component Value Date     10/17/2019     10/17/2019     2019    K 4.2 10/17/2019    K 4.2 10/17/2019    K 4.0 2019    K 4.0 01/10/2016     10/17/2019     10/17/2019     2019    CO2 24 10/17/2019    CO2 19 10/17/2019    CO2 26 2019    ANIONGAP 10 10/17/2019    ANIONGAP 19 10/17/2019    ANIONGAP 13 2019    GLUCOSE 275 2020    GLUCOSE 180 10/17/2019    GLUCOSE 241 10/17/2019    BUN 7 10/17/2019    BUN 8 10/17/2019    BUN 10 2019    CREATININE 0.5 10/17/2019    CREATININE 0.6 10/17/2019    CREATININE 0.6 2019    LABGLOM >60 10/17/2019    LABGLOM >60 10/17/2019    LABGLOM >60 07/26/2019    GFRAA >60 10/17/2019    GFRAA >60 10/17/2019    GFRAA >60 07/26/2019    CALCIUM 9.1 10/17/2019    CALCIUM 9.7 10/17/2019    CALCIUM 9.6 07/26/2019    PROT 7.6 10/17/2019    PROT 8.0 10/17/2019    PROT 7.8 07/26/2019    LABALBU 3.9 10/17/2019    LABALBU 4.4 10/17/2019    LABALBU 4.3 07/26/2019    BILITOT 0.8 10/17/2019    BILITOT 0.7 10/17/2019    BILITOT 0.7 07/26/2019    ALKPHOS 90 10/17/2019    ALKPHOS 96 10/17/2019    ALKPHOS 97 07/26/2019    AST 33 10/17/2019    AST 30 10/17/2019    AST 26 07/26/2019    ALT 38 10/17/2019    ALT 38 10/17/2019    ALT 35 07/26/2019     A1C  Lab Results   Component Value Date    LABA1C 9.9 01/30/2020    LABA1C 10.2 10/17/2019    LABA1C 9.5 07/26/2019     TSH  Lab Results   Component Value Date    TSH 1.290 10/17/2019    TSH 1.240 07/26/2019     FREET4  No results found for: T3DFRYR  LIPID  Lab Results   Component Value Date    CHOL 214 10/17/2019    CHOL 207 07/26/2019    HDL 56 10/17/2019    HDL 51 07/26/2019    LDLCALC 122 10/17/2019    LDLCALC 116 07/26/2019    TRIG 178 10/17/2019    TRIG 202 07/26/2019     VITAMIN D  No results found for: VITD25  MAGNESIUM  No results found for: MG   PHOS  No results found for: PHOS   DIANELYS   No results found for: DIANELYS  RHEUMATOID FACTOR  No results found for: RF  PSA  No results found for: PSA   HEPATITIS C  No results found for: HCVABI  HIV  No results found for: MPP1TPA, HIV1QT  UA  Lab Results   Component Value Date    COLORU Yellow 11/13/2019    COLORU Yellow 10/17/2019    COLORU Yellow 07/26/2019    COLORU Yellow 01/10/2016    CLARITYU Cloudy 11/13/2019    CLARITYU TURBID 10/17/2019    CLARITYU CLOUDY 07/26/2019    CLARITYU Clear 01/10/2016    GLUCOSEU 500 11/13/2019    GLUCOSEU >=1000 10/17/2019    GLUCOSEU 500 07/26/2019    GLUCOSEU Negative 01/10/2016    BILIRUBINUR Negative 11/13/2019    BILIRUBINUR Negative 10/17/2019    BILIRUBINUR Negative 07/26/2019    BILIRUBINUR Negative 01/10/2016    KETUA Negative 11/13/2019    KETUA TRACE 10/17/2019    KETUA Negative 07/26/2019    KETUA Negative 01/10/2016    SPECGRAV 1.015 11/13/2019    SPECGRAV >=1.030 10/17/2019    SPECGRAV >=1.030 07/26/2019    SPECGRAV >=1.030 01/10/2016    BLOODU small 11/13/2019    BLOODU Negative 10/17/2019    BLOODU Negative 07/26/2019    BLOODU Negative 01/10/2016    PHUR 5.5 11/13/2019    PHUR 6.0 10/17/2019    PHUR 5.5 07/26/2019    PHUR 5.5 01/10/2016    PROTEINU Negative 11/13/2019    PROTEINU Negative 10/17/2019    PROTEINU Negative 07/26/2019    PROTEINU Negative 01/10/2016    UROBILINOGEN 0.2 10/17/2019    UROBILINOGEN 0.2 07/26/2019    UROBILINOGEN 0.2 01/10/2016    NITRU Negative 10/17/2019    NITRU POSITIVE 07/26/2019    NITRU Negative 01/10/2016    LEUKOCYTESUR trace 11/13/2019    LEUKOCYTESUR Negative 10/17/2019    LEUKOCYTESUR SMALL 07/26/2019    LEUKOCYTESUR Negative 01/10/2016     Urine Micro/Albumin Ratio  Lab Results   Component Value Date    MALBCR 11.6 10/17/2019    MALBCR 6.3 07/26/2019       Review of Systems  ROS:  Const: Denies chills, fever, malaise and sweats. Eyes: Denies discharge, pain, redness and visual disturbance. ENMT: Denies earaches, other ear symptoms. Denies nasal or sinus symptoms other than stated  above. Denies mouth and tongue lesions and sore throat. CV: Denies chest discomfort, pain; diaphoresis, dizziness, edema, lightheadedness, orthopnea,  palpitations, syncope and near syncopal episode or any exertional symptoms  Resp: Denies cough, hemoptysis, pleuritic pain, SOB, sputum production and wheezing. GI: Denies abdominal pain, change in bowel habits, hematochezia, melena, nausea and vomiting. : Denies urinary symptoms including dysuria , urgency, frequency or hematuria. Musculo: Denies musculoskeletal symptoms. Skin: Denies bruising and rash.   Neuro: Denies headache, numbness, stiff neck, tingling and focal weakness slurred speech or facial  droop  Hema/Lymph: Denies bleeding/bruising tendency and enlarged lymph nodes        Current Outpatient Medications:     empagliflozin (JARDIANCE) 10 MG tablet, Take 1 tablet by mouth daily, Disp: 30 tablet, Rfl: 1    fluconazole (DIFLUCAN) 150 MG tablet, Take 1 po qd x 1. May repeat  x 1 in 7 days if needed, Disp: 2 tablet, Rfl: 0    DULoxetine (CYMBALTA) 60 MG extended release capsule, Take 1 capsule by mouth daily, Disp: 30 capsule, Rfl: 3    levothyroxine (SYNTHROID) 75 MCG tablet, Take 1 tablet by mouth Daily, Disp: 30 tablet, Rfl: 3    metFORMIN (GLUCOPHAGE-XR) 500 MG extended release tablet, Take 2 tablets by mouth 2 times daily, Disp: 120 tablet, Rfl: 3    omeprazole (PRILOSEC) 20 MG delayed release capsule, Take 1 capsule by mouth daily, Disp: 30 capsule, Rfl: 3    SITagliptin (JANUVIA) 100 MG tablet, Take 1 tablet by mouth daily, Disp: 30 tablet, Rfl: 3    Cholecalciferol (VITAMIN D3) 2000 units CAPS, Take 2,000 Units by mouth, Disp: , Rfl:     blood glucose monitor strips, Test two times a day & as needed for symptoms of irregular blood glucose., Disp: 300 strip, Rfl: 3    albuterol (PROVENTIL HFA;VENTOLIN HFA) 108 (90 BASE) MCG/ACT inhaler, Inhale 2 puffs into the lungs every 6 hours as needed. , Disp: , Rfl:   Allergies   Allergen Reactions    Celecoxib Hives       Past Medical History:   Diagnosis Date    Depression     Diabetes mellitus ([address]     GERD (gastroesophageal reflux disease)     Joint pain     Thyroid disease      Past Surgical History:   Procedure Laterality Date     SECTION      CHOLECYSTECTOMY      HYSTERECTOMY       No family history on file. Social History     Tobacco Use    Smoking status: Never Smoker    Smokeless tobacco: Never Used   Substance Use Topics    Alcohol use: No    Drug use: No      Social History     Patient does not qualify to have social determinant information on file (likely too young).    Social History Narrative    PMH:    Problem List: Eruption, out.    Still not taking. Declines injections . Watch ambulatory. If out  of range, let us know. Stressed importance of daily foot examinations, regular eye  examinations etc. Micro-and macrovascular complications reviewed, hyper or  hypoglycemic precautions reviewed . cont lifestyle. Last A1C excellent 6.6-9.5-10.2-9.9     Start Jardiance with precautions including UTI, Candida, Michelle's gangrene, amputation. Start 10 mg and we will titrate       Liver function study, abnormal  Counseled extensively. Differential reviewed, including serious etiologies. h/o fatty  liver. Precautions reviewed. Lifestyle modification appropriate diet and weight loss  reviewed. Risks of even this leading to cirrhosis reviewed. Other than basic  monitoring on interested in other evaluation or treatment, in-depth blood work,  imaging or otherwise. . normalized, HEP B and C neg (nonimmune and declines  immun)        Thyroid nodule  Counseled extensively. Differential reviewed, including serious etiologies. History of  thyroid nodule resected with \"various cells\" but ultimately deemed to be benign. She  had a thyroid ultrasound 10/18 showing \"two benign cysts\" and 2 lymph nodes \"not  overtly suspicious. She wanted to see Dr. Sven Rivera to be safe but never followed through. She says she will do so now.     Hypothyroidism  With history of partial left thyroidectomy. montior blood work. On Synthroid. Compliance with the Dr. Sven Rivera referral reviewed. Thyroid ultrasound 12/15 showed thyroid goiter , 3mm nodule without  suspicious features and partial left lobectomy . , repeat 10/18 as above.        Health maintenance examination  Health maintenance issues discussed at length 9/18. Encouraged yearly physicals.        Vitamin D deficiency  cont approp supplementation        Inflammatory polyarthropathy ([address]  failed mtx. did not tolerated. gi intolerance. bw neg. recommend cont per dr dick/rheumatology but declines follow-up now.   asx on cymbalta        Anxiety and depression  Was doing well sertraline but because of the chronic pain we changed to Cymbalta   which is helping better with pain and she's doing well with both this and the emotion    LEXIS (obstructive sleep apnea)  Doing very well with CPAP. Uses 6-7 hours per night 7 days a week with great  benefit.        Anemia  Chronically Low MCV, monitor. Iron and hemoglobin electrophoresis have been  normal.  MCV since normalized. counseled extensively. Differential reviewed, including serious etiologies. Declines further evaluation or treatment other than as noted. Notify me if changes  mind. Asymptomatic        Calculus, kidney  CAT scan showing nonobstructing kidney stone left, 1/16. Declines other evaluation  treatment now. Uric acid 3/16 negative. Proper hydration low oxalate diet. Declines  urology referral. asx.        Mixed hyperlipidemia  Goals reviewed. Declines meds despite R/B especially in relation to diabetes. Lifestyle modification reviewed. risk of  hyperlipidemia reviewed monitor  Await repeat     Gastroesophageal reflux disease without esophagitis  Asymptomatic as long as taking therapy. She actually noticed being asymptomatic on 40 mg every other day and would like to lower to 20 mill grams daily now. However gets breakthrough off medication. Declines further testing and understands  risk of masking underlying etiologies. Risks of meds also reviewed incl RI/Electrolye  malabsorption. EGD 2011. Showed gerd          No flowsheet data found. Plan as above. Counseled extensively and differential diagnoses relevant to above were reviewed, including serious etiologies. Side effects and interactions of medications were reviewed. My concerns reviewed. Defers baseline labs, hemoglobin A1c and glucose checked today as above. Start Jardiance with precautions. Refer back to Dr. Leon Smith. Blood work 3 weeks follow-up 4-week sooner as needed.  diflucan          As long as symptoms steadily improve/resolve, and medical conditions follow the expected course, FU as below, sooner PRN. Return in about 4 weeks (around 2/27/2020), or if symptoms worsen or fail to improve, for Review BW, Multiple. Signs and symptoms to watch for discussed, serious signs and symptoms reviewed. ER if any. Over 40 minutes  spent with the patient in reviewing records, reviewing with patient/family, counseling, ordering,  prescribing, completing h&p, etc., with over 50% of the time spent face to face counseling. Marine Maher MD    Patients are advised to check with insurance company to ensure coverage and to fully understand benefits and cost prior to any testing. This note was created with the assistance of voice recognition software. Document was reviewed however may contain grammatical errors.

## 2020-02-24 LAB
ALBUMIN SERPL-MCNC: 4.2 G/DL
ALP BLD-CCNC: 91 U/L
ALT SERPL-CCNC: 49 U/L
ANION GAP SERPL CALCULATED.3IONS-SCNC: NORMAL MMOL/L
AST SERPL-CCNC: 38 U/L
BILIRUB SERPL-MCNC: 0.9 MG/DL (ref 0.1–1.4)
BILIRUBIN, URINE: NEGATIVE
BLOOD, URINE: NEGATIVE
BUN BLDV-MCNC: 12 MG/DL
CALCIUM SERPL-MCNC: 8.9 MG/DL
CHLORIDE BLD-SCNC: 104 MMOL/L
CHOLESTEROL, TOTAL: 240 MG/DL
CHOLESTEROL/HDL RATIO: 3.9
CLARITY: ABNORMAL
CO2: 25 MMOL/L
COLOR: YELLOW
CREAT SERPL-MCNC: 0.66 MG/DL
CREATININE, URINE: 15
GFR CALCULATED: 108
GLUCOSE BLD-MCNC: 253 MG/DL
GLUCOSE URINE: ABNORMAL
HDLC SERPL-MCNC: 61 MG/DL (ref 35–70)
KETONES, URINE: ABNORMAL
LDL CHOLESTEROL CALCULATED: 152 MG/DL (ref 0–160)
LEUKOCYTE ESTERASE, URINE: NEGATIVE
MICROALBUMIN/CREAT 24H UR: 1.8 MG/G{CREAT}
MICROALBUMIN/CREAT UR-RTO: 118
NITRITE, URINE: NEGATIVE
PH UA: ABNORMAL
POTASSIUM SERPL-SCNC: 4.1 MMOL/L
PROTEIN UA: NEGATIVE
SODIUM BLD-SCNC: 139 MMOL/L
SPECIFIC GRAVITY, URINE: ABNORMAL
T4 FREE: 1.3
TOTAL PROTEIN: 7.1
TRIGL SERPL-MCNC: 142 MG/DL
TSH SERPL DL<=0.05 MIU/L-ACNC: 0.72 UIU/ML
UROBILINOGEN, URINE: ABNORMAL
VLDLC SERPL CALC-MCNC: NORMAL MG/DL

## 2020-02-25 ENCOUNTER — OFFICE VISIT (OUTPATIENT)
Dept: PRIMARY CARE CLINIC | Age: 45
End: 2020-02-25
Payer: COMMERCIAL

## 2020-02-25 VITALS
HEART RATE: 89 BPM | SYSTOLIC BLOOD PRESSURE: 128 MMHG | WEIGHT: 249 LBS | BODY MASS INDEX: 35.73 KG/M2 | OXYGEN SATURATION: 97 % | DIASTOLIC BLOOD PRESSURE: 72 MMHG

## 2020-02-25 PROBLEM — L02.214 ABSCESS OF GROIN, LEFT: Status: ACTIVE | Noted: 2020-02-25

## 2020-02-25 LAB — HBA1C MFR BLD: 9.7 %

## 2020-02-25 PROCEDURE — 90746 HEPB VACCINE 3 DOSE ADULT IM: CPT | Performed by: FAMILY MEDICINE

## 2020-02-25 PROCEDURE — 90471 IMMUNIZATION ADMIN: CPT | Performed by: FAMILY MEDICINE

## 2020-02-25 PROCEDURE — 99215 OFFICE O/P EST HI 40 MIN: CPT | Performed by: FAMILY MEDICINE

## 2020-02-25 PROCEDURE — 90472 IMMUNIZATION ADMIN EACH ADD: CPT | Performed by: FAMILY MEDICINE

## 2020-02-25 PROCEDURE — 83036 HEMOGLOBIN GLYCOSYLATED A1C: CPT | Performed by: FAMILY MEDICINE

## 2020-02-25 PROCEDURE — 90632 HEPA VACCINE ADULT IM: CPT | Performed by: FAMILY MEDICINE

## 2020-02-25 RX ORDER — FLUCONAZOLE 150 MG/1
TABLET ORAL
Qty: 2 TABLET | Refills: 0 | Status: SHIPPED
Start: 2020-02-25 | End: 2020-05-13 | Stop reason: ALTCHOICE

## 2020-02-25 RX ORDER — DOXYCYCLINE HYCLATE 100 MG
100 TABLET ORAL 2 TIMES DAILY
Qty: 20 TABLET | Refills: 0 | Status: SHIPPED | OUTPATIENT
Start: 2020-02-25 | End: 2020-03-06

## 2020-02-25 NOTE — ASSESSMENT & PLAN NOTE
Counseled. At this point wishes to move forward with doxycycline with precautions including C. difficile, risk benefits of probiotic reviewed, Bactroban with precautions. Declines procedural intervention.

## 2020-02-25 NOTE — PROGRESS NOTES
19  Kimmy Pink : 1975 Sex: female  Age: 38 y.o. Chief Complaint   Patient presents with    Discuss Labs       HPI  HPI:    Patient presents today for follow-up. Overall feels well, tolerating the Jardiance 10 mg, found to discount card. She was given some mild intermittent yeast infections before the Jardiance, asks for Diflucan to have on hand especially for going on an antibiotic. She is noticed a boil in the left groin that is gotten somewhat larger and somewhat tender, no drainage fever or chills. No other complaints or concerns. Sees Dr. Saad Rivera in May    Glucose is finally improving, 159 this morning, hemoglobin A1c went from 10.2-9.7. Willing to increase Jardiance to 25 mg. Microalbumin creatinine ratio negative, LDL went from 1 22-1 52 and she simply wants monitored at this point, triglyceride 142 HDL 61, glucose was 253 on 2020. Again she states she is noticed noticeable improvement over the past few days. AST 38, ALT 49 with history of fatty liver TSH 0.72, free T4 1.3, trace ketones 3+ glucose and bacteria in the urine few, asymptomatic. Long overdue on GYN, over 10yrs.  Emphasized need asap and she'll do on her own              Most Recent Labs  CBC  Lab Results   Component Value Date    WBC 9.6 10/17/2019    WBC 9.2 10/17/2019    WBC 8.5 2019    RBC 5.44 10/17/2019    RBC 5.80 10/17/2019    RBC 5.62 2019    HGB 13.8 10/17/2019    HGB 14.3 10/17/2019    HGB 14.3 2019    HCT 43.5 10/17/2019    HCT 47.2 10/17/2019    HCT 46.0 2019    MCV 80.0 10/17/2019    MCV 81.4 10/17/2019    MCV 81.9 2019     10/17/2019     10/17/2019     2019      CMP  Lab Results   Component Value Date     10/17/2019     10/17/2019     2019    K 4.2 10/17/2019    K 4.2 10/17/2019    K 4.0 2019    K 4.0 01/10/2016     10/17/2019     10/17/2019     2019    CO2 24 10/17/2019    CO2 19 10/17/2019    CO2 26 07/26/2019    ANIONGAP 10 10/17/2019    ANIONGAP 19 10/17/2019    ANIONGAP 13 07/26/2019    GLUCOSE 275 01/30/2020    GLUCOSE 180 10/17/2019    GLUCOSE 241 10/17/2019    BUN 7 10/17/2019    BUN 8 10/17/2019    BUN 10 07/26/2019    CREATININE 0.5 10/17/2019    CREATININE 0.6 10/17/2019    CREATININE 0.6 07/26/2019    LABGLOM >60 10/17/2019    LABGLOM >60 10/17/2019    LABGLOM >60 07/26/2019    GFRAA >60 10/17/2019    GFRAA >60 10/17/2019    GFRAA >60 07/26/2019    CALCIUM 9.1 10/17/2019    CALCIUM 9.7 10/17/2019    CALCIUM 9.6 07/26/2019    PROT 7.6 10/17/2019    PROT 8.0 10/17/2019    PROT 7.8 07/26/2019    LABALBU 3.9 10/17/2019    LABALBU 4.4 10/17/2019    LABALBU 4.3 07/26/2019    BILITOT 0.8 10/17/2019    BILITOT 0.7 10/17/2019    BILITOT 0.7 07/26/2019    ALKPHOS 90 10/17/2019    ALKPHOS 96 10/17/2019    ALKPHOS 97 07/26/2019    AST 33 10/17/2019    AST 30 10/17/2019    AST 26 07/26/2019    ALT 38 10/17/2019    ALT 38 10/17/2019    ALT 35 07/26/2019     A1C  Lab Results   Component Value Date    LABA1C 9.7 02/25/2020    LABA1C 9.9 01/30/2020    LABA1C 10.2 10/17/2019     TSH  Lab Results   Component Value Date    TSH 1.290 10/17/2019    TSH 1.240 07/26/2019     FREET4  No results found for: W9VULUD  LIPID  Lab Results   Component Value Date    CHOL 214 10/17/2019    CHOL 207 07/26/2019    HDL 56 10/17/2019    HDL 51 07/26/2019    LDLCALC 122 10/17/2019    LDLCALC 116 07/26/2019    TRIG 178 10/17/2019    TRIG 202 07/26/2019     VITAMIN D  No results found for: VITD25  MAGNESIUM  No results found for: MG   PHOS  No results found for: PHOS   DIANELYS   No results found for: DIANELYS  RHEUMATOID FACTOR  No results found for: RF  PSA  No results found for: PSA   HEPATITIS C  No results found for: HCVABI  HIV  No results found for: KMJ7KLX, HIV1QT  UA  Lab Results   Component Value Date    COLORU Yellow 11/13/2019    COLORU Yellow 10/17/2019    COLORU Yellow 07/26/2019    COLORU Yellow 01/10/2016 abdominal pain, change in bowel habits, hematochezia, melena, nausea and vomiting. : Denies urinary symptoms including dysuria , urgency, frequency or hematuria. Musculo: Denies musculoskeletal symptoms. Skin: Denies bruising and rash other than as above. Neuro: Denies headache, numbness, stiff neck, tingling and focal weakness slurred speech or facial  droop  Hema/Lymph: Denies bleeding/bruising tendency and enlarged lymph nodes        Current Outpatient Medications:     empagliflozin (JARDIANCE) 25 MG tablet, Take 25 mg by mouth daily, Disp: 30 tablet, Rfl: 3    fluconazole (DIFLUCAN) 150 MG tablet, Take 1 po qd x 1 at onset of symptoms. Repeat  x 1 after course of antibiotic, Disp: 2 tablet, Rfl: 0    doxycycline hyclate (VIBRA-TABS) 100 MG tablet, Take 1 tablet by mouth 2 times daily for 10 days, Disp: 20 tablet, Rfl: 0    mupirocin (BACTROBAN) 2 % ointment, Apply 3 times daily x 10 days, Disp: 30 g, Rfl: 0    DULoxetine (CYMBALTA) 60 MG extended release capsule, Take 1 capsule by mouth daily, Disp: 30 capsule, Rfl: 3    levothyroxine (SYNTHROID) 75 MCG tablet, Take 1 tablet by mouth Daily, Disp: 30 tablet, Rfl: 3    metFORMIN (GLUCOPHAGE-XR) 500 MG extended release tablet, Take 2 tablets by mouth 2 times daily, Disp: 120 tablet, Rfl: 3    omeprazole (PRILOSEC) 20 MG delayed release capsule, Take 1 capsule by mouth daily, Disp: 30 capsule, Rfl: 3    SITagliptin (JANUVIA) 100 MG tablet, Take 1 tablet by mouth daily, Disp: 30 tablet, Rfl: 3    Cholecalciferol (VITAMIN D3) 2000 units CAPS, Take 2,000 Units by mouth, Disp: , Rfl:     blood glucose monitor strips, Test two times a day & as needed for symptoms of irregular blood glucose., Disp: 300 strip, Rfl: 3    albuterol (PROVENTIL HFA;VENTOLIN HFA) 108 (90 BASE) MCG/ACT inhaler, Inhale 2 puffs into the lungs every 6 hours as needed. , Disp: , Rfl:   Allergies   Allergen Reactions    Celecoxib Hives       Past Medical History:   Diagnosis Date Sclerae clear. ENMT: Auditory canals normal. Tympanic membranes: intact and translucent. External nose WNL. Nasal mucosa is clear. Oropharynx: No erythema or exudate. Posterior pharynx is normal.  Neck: Supple. Palpation reveals no adenopathy. No masses appreciated. No JVD. Carotids: no  bruits. Resp: Respirations are unlabored. Clear to auscultation. No rales, rhonchi or wheezes appreciated  over the lungs bilaterally. CV: Rate is regular. Rhythm is regular. No gallop or rubs. No heart murmur appreciated. Extremities: No clubbing, cyanosis, or edema. No calf inflammation or tenderness. Abdomen: Bowel sounds are normoactive. Abdomen is soft, nontender, and nondistended. No  abdominal masses. No palpable hepatosplenomegaly. Lymph: No palpable or visible regional lymphadenopathy. Musculoskeletal: no acute joint inflammation. Skin: Dry and warm with no rash. She does have a small cyst minimally inflamed left upper leg, no surrounding redness erythema or induration. She would like treatment as below. Declines procedural intervention unless it continues or worsens. Neuro: Alert and oriented. Affect: appropriate. Upper Extremities: 5/5 bilaterally. Lower Extremities:  5/5 bilaterally. Sensation intact to light touch. Reflexes: DTR's are symmetric and 2+ bilaterally. .  Cranial Nerves: Cranial nerves grossly intact. Assessment and Plan:   Diagnosis Orders   1. Type 2 diabetes mellitus with hyperglycemia, without long-term current use of insulin ([facility])  POCT glycosylated hemoglobin (Hb A1C)    empagliflozin (JARDIANCE) 25 MG tablet    Comprehensive Metabolic Panel    Urinalysis    Microalbumin / Creatinine Urine Ratio    Hemoglobin A1C   2. Vaginal candida  fluconazole (DIFLUCAN) 150 MG tablet   3. Liver function study, abnormal  Comprehensive Metabolic Panel    Hep A Vaccine Adult (VAQTA)    Hep B Vaccine Adult (ENGERIX B)   4. Thyroid nodule  TSH without Reflex    T4, Free   5.  Hypothyroidism, unspecified type  TSH without Reflex    T4, Free   6. Vitamin D deficiency     7. Inflammatory polyarthropathy ([address]     8. Anxiety and depression     9. LEXIS (obstructive sleep apnea)     10. Anemia, unspecified type  CBC Auto Differential   11. Calculus, kidney     12. Mixed hyperlipidemia  Lipid Panel    CK   13. Gastroesophageal reflux disease without esophagitis     14. Health maintenance examination     15. Abscess of groin, left  doxycycline hyclate (VIBRA-TABS) 100 MG tablet    mupirocin (BACTROBAN) 2 % ointment       Vaginal candida  Counseled. Empiric Diflucan with precautions. Declines exam.     Type 2 diabetes mellitus with hyperglycemia, without long-term current use of insulin (HCC)  Tolerating medications., On Bethany Kobs with precautions reviewed including lactic  acidosis diarrhea, on Januvia as well precautions including pancreatic issues and thyroid cancer   Was On Glucotrol with precautions including hypoglycemia. She since stopped it when ran out. Still not taking. Declines injections . Watch ambulatory. If out  of range, let us know. Stressed importance of daily foot examinations, regular eye  examinations etc. Micro-and macrovascular complications reviewed, hyper or  hypoglycemic precautions reviewed . cont lifestyle. Last A1C excellent 6.6-9.5-10.2-9.9 -9.7    Last time Jardiance was started  with precautions including UTI, Candida, Michelle's gangrene, amputation. ,  Overall tolerating well and she will increase to 25 mg daily. Planning to see Dr. Moe Beltran in May.     Liver function study, abnormal  Counseled extensively. Differential reviewed, including serious etiologies. h/o fatty  liver. Precautions reviewed. Lifestyle modification appropriate diet and weight loss  reviewed. Risks of even this leading to cirrhosis reviewed. Other than basic  monitoring on interested in other evaluation or treatment, in-depth blood work,  imaging or otherwise.  . normalized, HEP B and C neg (nonimmune and now willing to start hepatitis A and B vaccine series)       Thyroid nodule  Counseled extensively. Differential reviewed, including serious etiologies. History of  thyroid nodule resected with \"various cells\" but ultimately deemed to be benign. She  had a thyroid ultrasound 10/18 showing \"two benign cysts\" and 2 lymph nodes \"not  overtly suspicious. She is going to see Dr. Marci Dozier in May and defers further evaluation and treatment to him. Hypothyroidism  With history of partial left thyroidectomy. montior blood work. On Synthroid. And to see Dr. Marci Dozier in May. Thyroid ultrasound 12/15 showed thyroid goiter , 3mm nodule without  suspicious features and partial left lobectomy . , repeat 10/18 as above. TFTs stable.        Health maintenance examination  Health maintenance issues discussed at length 9/18. Encouraged yearly physicals. Importance and what this entails reviewed.        Vitamin D deficiency  cont approp supplementation        Inflammatory polyarthropathy ([address]  failed mtx. did not tolerated. gi intolerance. bw neg. recommend cont per dr dick/rheumatology but declines follow-up now. asx on cymbalta        Anxiety and depression  Was doing well sertraline but because of the chronic pain we changed to Cymbalta   which is helping better with pain and she's doing well with both this and the emotion    LEXIS (obstructive sleep apnea)  Doing very well with CPAP. Uses 6-7 hours per night 7 days a week with great  benefit.        Anemia  Chronically Low MCV, monitor. Iron and hemoglobin electrophoresis have been  normal.  MCV since normalized. counseled extensively. Differential reviewed, including serious etiologies. Declines further evaluation or treatment other than as noted. Notify me if changes  mind. Asymptomatic. Monitor        Calculus, kidney  CAT scan showing nonobstructing kidney stone left, 1/16. Declines other evaluation  treatment now. Uric acid 3/16 negative.  Proper hydration low oxalate

## 2020-03-26 PROBLEM — Z00.00 HEALTH MAINTENANCE EXAMINATION: Status: RESOLVED | Noted: 2019-07-25 | Resolved: 2020-03-26

## 2020-04-22 ENCOUNTER — VIRTUAL VISIT (OUTPATIENT)
Dept: PRIMARY CARE CLINIC | Age: 45
End: 2020-04-22
Payer: COMMERCIAL

## 2020-04-22 PROBLEM — J40 BRONCHITIS: Status: ACTIVE | Noted: 2020-04-22

## 2020-04-22 PROCEDURE — 99214 OFFICE O/P EST MOD 30 MIN: CPT | Performed by: FAMILY MEDICINE

## 2020-04-22 RX ORDER — FLUCONAZOLE 150 MG/1
TABLET ORAL
Qty: 2 TABLET | Refills: 0 | Status: SHIPPED
Start: 2020-04-22 | End: 2020-05-13 | Stop reason: ALTCHOICE

## 2020-04-22 RX ORDER — LEVOTHYROXINE SODIUM 0.07 MG/1
75 TABLET ORAL DAILY
Qty: 90 TABLET | Refills: 3 | Status: SHIPPED
Start: 2020-04-22 | End: 2020-09-01 | Stop reason: SDUPTHER

## 2020-04-22 RX ORDER — OMEPRAZOLE 20 MG/1
20 CAPSULE, DELAYED RELEASE ORAL DAILY
Qty: 90 CAPSULE | Refills: 3 | Status: SHIPPED | OUTPATIENT
Start: 2020-04-22

## 2020-04-22 RX ORDER — ALBUTEROL SULFATE 90 UG/1
2 AEROSOL, METERED RESPIRATORY (INHALATION) EVERY 4 HOURS PRN
Qty: 1 INHALER | Refills: 1 | Status: SHIPPED
Start: 2020-04-22 | End: 2020-06-16 | Stop reason: ALTCHOICE

## 2020-04-22 RX ORDER — GLUCOSAMINE HCL/CHONDROITIN SU 500-400 MG
CAPSULE ORAL
Qty: 300 STRIP | Refills: 3 | Status: SHIPPED
Start: 2020-04-22 | End: 2020-06-03 | Stop reason: CLARIF

## 2020-04-22 RX ORDER — DULOXETIN HYDROCHLORIDE 60 MG/1
60 CAPSULE, DELAYED RELEASE ORAL DAILY
Qty: 90 CAPSULE | Refills: 3 | Status: SHIPPED
Start: 2020-04-22 | End: 2020-11-19

## 2020-04-22 RX ORDER — METFORMIN HYDROCHLORIDE 500 MG/1
1000 TABLET, EXTENDED RELEASE ORAL 2 TIMES DAILY
Qty: 360 TABLET | Refills: 3 | Status: SHIPPED
Start: 2020-04-22 | End: 2021-04-26 | Stop reason: SDUPTHER

## 2020-04-22 RX ORDER — EMPAGLIFLOZIN 25 MG/1
25 TABLET, FILM COATED ORAL DAILY
Qty: 90 TABLET | Refills: 3 | Status: SHIPPED
Start: 2020-04-22 | End: 2020-05-13

## 2020-04-22 NOTE — PROGRESS NOTES
Thyroid nodule  levothyroxine (SYNTHROID) 75 MCG tablet   6. Hypothyroidism, unspecified type  levothyroxine (SYNTHROID) 75 MCG tablet   7. Bronchitis     8. Liver function study, abnormal     9. Vitamin D deficiency     10. Inflammatory polyarthropathy ([address]     11. LEXIS (obstructive sleep apnea)     12. Anemia, unspecified type     13. Calculus, kidney     14. Mixed hyperlipidemia     15. Abscess of groin, left         Bronchitis  Symptoms concerning for Covid-19. Risk of complications with her comorbidities reviewed. She is on Z-Ruiz now with precautions including prolonged QT and C. difficile. Risk benefits of probiotic reviewed. Albuterol as directed as needed. Declines to be seen in person at this time of the symptoms persist or worsen. Precautions reviewed. Vaginal candida  Counseled. Empiric Diflucan with precautions.         Type 2 diabetes mellitus with hyperglycemia, without long-term current use of insulin (HCC)  Tolerating medications., On Pearly Schillings with precautions reviewed including lactic  acidosis diarrhea, on Januvia as well precautions including pancreatic issues and thyroid cancer   Was On Glucotrol with precautions including hypoglycemia. She since stopped it when ran out.    Still not taking. Declines injections . Watch ambulatory. If out  of range, let us know. Stressed importance of daily foot examinations, regular eye  examinations etc. Micro-and macrovascular complications reviewed, hyper or  hypoglycemic precautions reviewed . cont lifestyle. Last A1C excellent 6.6-9.5-10.2-9.9 -9.7     Last time Jardiance was started  with precautions including UTI, Candida, Michelle's gangrene, amputation. ,  Overall tolerating well and she will increase to 25 mg daily.     Planning to see Dr. Armand Mitchell in May.     Liver function study, abnormal  Counseled extensively. Differential reviewed, including serious etiologies. h/o fatty  liver. Precautions reviewed.  Lifestyle modification appropriate diet and weight loss  reviewed. Risks of even this leading to cirrhosis reviewed. Other than basic  monitoring on interested in other evaluation or treatment, in-depth blood work,  imaging or otherwise. . normalized, HEP B and C neg (nonimmune and now willing to start hepatitis A and B vaccine series)        Thyroid nodule  Counseled extensively. Differential reviewed, including serious etiologies. History of  thyroid nodule resected with \"various cells\" but ultimately deemed to be benign. She  had a thyroid ultrasound 10/18 showing \"two benign cysts\" and 2 lymph nodes \"not  overtly suspicious.    She is going to see Dr. Afsaneh Gale in May and defers further evaluation and treatment to him.     Hypothyroidism  With history of partial left thyroidectomy. montior blood work. On Synthroid.    And to see Dr. Paola Toledo in May. Thyroid ultrasound 12/15 showed thyroid goiter , 3mm nodule without  suspicious features and partial left lobectomy . , repeat 10/18 as above. TFTs stable.        Health maintenance examination  Health maintenance issues discussed at length 9/18. Encouraged yearly physicals. Importance and what this entails reviewed.        Vitamin D deficiency  cont approp supplementation        Inflammatory polyarthropathy ([address]  failed mtx. did not tolerated. gi intolerance. bw neg. recommend cont per dr dick/rheumatology but declines follow-up now. asx on cymbalta        Anxiety and depression  Was doing well sertraline but because of the chronic pain we changed to Cymbalta   which is helping better with pain and she's doing well with both this and the emotion     LEXIS (obstructive sleep apnea)  Doing very well with CPAP. Uses 6-7 hours per night 7 days a week with great  benefit.        Anemia  Chronically Low MCV, monitor. Iron and hemoglobin electrophoresis have been  normal.  MCV since normalized. counseled extensively. Differential reviewed, including serious etiologies.   Declines further evaluation or treatment other than as noted. Notify me if changes  mind. Asymptomatic. Monitor        Calculus, kidney  CAT scan showing nonobstructing kidney stone left, 1/16. Declines other evaluation  treatment now. Uric acid 3/16 negative. Proper hydration low oxalate diet. Declines  urology referral. asx.        Mixed hyperlipidemia  Goals reviewed. Declines meds despite R/B especially in relation to diabetes. Lifestyle modification reviewed. risk of  hyperlipidemia reviewed monitor  Counseled extensively.        Gastroesophageal reflux disease without esophagitis  Asymptomatic as long as taking therapy.    Breakthrough off medication. She is tolerating omeprazole 20 mg daily, previously was on 40 mg daily. Declines further testing and understands  risk of masking underlying etiologies. Risks of meds also reviewed incl RI/Electrolye  malabsorption.  EGD 2011. Showed gerd       Abscess of groin, left  Resolved. She states nothing to visualize at this time. Plan as above:  Simply wants to proceed as above, all refills given, she is going to get blood work in about a month and keep her follow-up then sooner as needed. Precautions reviewed. As long as symptoms steadily improve/resolve and medical conditions are following the expected course, FU as below, sooner PRN      No follow-ups on file. Farhat Epstein is a 38 y.o. female being evaluated by a Virtual Visit (video visit) encounter to address concerns as mentioned above. A caregiver was present when appropriate. Due to this being a TeleHealth encounter (During YKOVV-15 public health emergency), evaluation of the following organ systems was limited: Vitals/Constitutional/EENT/Resp/CV/GI//MS/Neuro/Skin/Heme-Lymph-Imm.   Pursuant to the emergency declaration under the [facility] waiver authority and the [employer] and [employer] Act, this Virtual Visit was conducted with

## 2020-04-23 ENCOUNTER — TELEPHONE (OUTPATIENT)
Dept: PRIMARY CARE CLINIC | Age: 45
End: 2020-04-23

## 2020-05-12 ENCOUNTER — TELEPHONE (OUTPATIENT)
Dept: PRIMARY CARE CLINIC | Age: 45
End: 2020-05-12

## 2020-05-13 ENCOUNTER — VIRTUAL VISIT (OUTPATIENT)
Dept: PRIMARY CARE CLINIC | Age: 45
End: 2020-05-13
Payer: COMMERCIAL

## 2020-05-13 VITALS — TEMPERATURE: 97.7 F

## 2020-05-13 PROCEDURE — 99213 OFFICE O/P EST LOW 20 MIN: CPT | Performed by: FAMILY MEDICINE

## 2020-05-13 RX ORDER — FLUCONAZOLE 150 MG/1
TABLET ORAL
Qty: 2 TABLET | Refills: 0 | Status: SHIPPED
Start: 2020-05-13 | End: 2020-06-02 | Stop reason: ALTCHOICE

## 2020-05-27 ENCOUNTER — VIRTUAL VISIT (OUTPATIENT)
Dept: ENDOCRINOLOGY | Age: 45
End: 2020-05-27
Payer: COMMERCIAL

## 2020-05-27 PROCEDURE — 99205 OFFICE O/P NEW HI 60 MIN: CPT | Performed by: INTERNAL MEDICINE

## 2020-05-27 RX ORDER — LANCETS
EACH MISCELLANEOUS
Qty: 100 EACH | Refills: 3 | Status: SHIPPED
Start: 2020-05-27 | End: 2020-11-30 | Stop reason: CLARIF

## 2020-05-27 RX ORDER — GLIPIZIDE 10 MG/1
10 TABLET, FILM COATED, EXTENDED RELEASE ORAL DAILY
Qty: 30 TABLET | Refills: 5 | Status: SHIPPED
Start: 2020-05-27 | End: 2020-09-30 | Stop reason: SDUPTHER

## 2020-05-27 RX ORDER — ASPIRIN 81 MG/1
81 TABLET ORAL DAILY
COMMUNITY

## 2020-05-27 RX ORDER — LORATADINE 10 MG/1
10 TABLET ORAL DAILY
COMMUNITY

## 2020-05-27 RX ORDER — GLUCOSAMINE HCL/CHONDROITIN SU 500-400 MG
CAPSULE ORAL
Qty: 100 STRIP | Refills: 5 | Status: SHIPPED
Start: 2020-05-27 | End: 2020-06-03 | Stop reason: SDUPTHER

## 2020-05-27 NOTE — PROGRESS NOTES
for Wheezing or Shortness of Breath 1 Inhaler 1    blood glucose monitor strips Test two times a day & as needed for symptoms of irregular blood glucose. Freestyle lite 300 strip 3    Cholecalciferol (VITAMIN D3) 2000 units CAPS Take 2,000 Units by mouth daily        No current facility-administered medications for this visit. Review of Systems  Constitutional: No fever, no chills, no diaphoresis, no generalized weakness. HEENT: No blurred vision, No sore throat, no ear pain, no hair loss  Neck: denied any neck swelling, difficulty swallowing,   Cardio-pulmonary: No CP, SOB or palpitation, No orthopnea or PND. No cough or wheezing. GI: No N/V/D, no constipation, No abdominal pain, no melena or hematochezia   : Denied any dysuria, hematuria, flank pain, discharge, or incontinence. Skin: denied any rash, ulcer, Hirsute, or hyperpigmentation. MSK: denied any joint deformity, joint pain/swelling, muscle pain, or back pain. Neuro: no numbness, no tingling, no weakness, _    OBJECTIVE    There were no vitals taken for this visit. BP Readings from Last 4 Encounters:   02/25/20 128/72   01/30/20 124/60   11/13/19 124/82   10/17/19 115/72     Wt Readings from Last 6 Encounters:   02/25/20 249 lb (112.9 kg)   01/30/20 251 lb (113.9 kg)   11/13/19 252 lb 6.4 oz (114.5 kg)   10/17/19 249 lb (112.9 kg)   10/17/19 249 lb (112.9 kg)   07/25/19 248 lb (112.5 kg)     Physical examination:  Due to this being a TeleHealth encounter, evaluation of the following organ systems is limited: Vitals/Constitutional/EENT/Resp/CV/GI//MS/Neuro/Skin/Heme-Lymph-Imm. Modified physical exam through Telemedicine camera    General: Communicating well via camera   Neck: no obvious neck mass. No obvious neck deformity     CVS: no distress   Chest: no distress.  Chest is moving with respiration    Extremities:  no visible tremor  Skin: No visible rashes as seen from camera   Musculoskeletal: no visible deformity  Neuro: Alert and 4. Hyperthyroidism     5. Hypothyroidism, unspecified type  TSH without Reflex    T4, Free     Serge Garcia MD  Endocrinologist, [facility]   [address] [facility], [address] [place] [address],[id number]   Phone: [phone]  Fax: [phone]  --------------------------------------------  An electronic signature was used to authenticate this note. Stasia Primrose, MD on 5/27/2020 at 12:27 PM  Services were provided through a video synchronous discussion virtually to substitute for in-person clinic visit. Pursuant to the emergency declaration under the [facility] waiver authority and the [employer] and [employer] Act, this Virtual  Visit was conducted, with patient's consent, to reduce the patient's risk of exposure to COVID-19 and provide continuity of care.

## 2020-05-27 NOTE — PROGRESS NOTES
Bruce Velasquez was read the following message We want to confirm that, for purposes of billing, this is a virtual visit with your provider for which we will submit a claim for reimbursement with your insurance company. You will be responsible for any copays, coinsurance amounts or other amounts not covered by your insurance company. If you do not accept this, unfortunately we will not be able to schedule a virtual visit with the provider. Do you accept?  Awilda Toscano responded YES

## 2020-05-27 NOTE — LETTER
[address] [place] [address] Department of Endocrinology Diabetes and Metabolism   [address] [place] [address]   Phone: [phone]  Fax: [phone]      Provider: Lilly Vera MD  Primary Care Physician: Joe Rodriges MD   Referring Provider: Darling Nichols MD    Patient: Moisés Nunn  YOB: 1975  Date of Visit: 5/27/2020      Dear Dr. Joe Rodriges MD   I had the pleasure of seeing your patient Moisés Nunn today at endocrine clinic for consultation visit and I enclosed a copy of the office visit completed today. Thank you very much for asking us to participate in the care of this very pleasant patient. Please don't hesitate to call if there are any further questions or concerns. Sincerely   Lilly Vera MD  Endocrinologist, [address] [address]   Phone: [phone]  Fax: [phone]      [address] [place] [address] Department of Endocrinology Diabetes and Metabolism   [address] [place] [address]   Phone: [phone]  Fax: [phone]    Date of Service: 5/27/2020    Primary Care Physician: Joe Rodriges MD  Referring physician: Darling Nichols MD  Provider: Lilly Vera MD     Reason for the visit:  DM type 2, Hypothyroidism, MNG     History of Present Illness: The history is provided by the patient. No  was used. Accuracy of the patient data is excellent. Moisés Nunn is a very pleasant 38 y.o. female seen today for diabetes management     Moisés Nunn was diagnosed with diabetes at age 46 and currently on Metformin 1000 mg BID, Januvia 100 mg daily  Wasn't able to tolerate Jardiance because of yeast infection   The patient has been checking blood sugar 2-3 times/wk.  Readings usually around 220  Most recent A1c results summarized below  Lab Results   Component Value Date    LABA1C 9.7 02/25/2020    LABA1C 9.9 01/30/2020    LABA1C 10.2 10/17/2019 ? Ordered referral for diabetes class     Hypothyroidism   Continue levothyroxine 75 mcg daily  TFT at next OV     MNG   · S/p Left lobectomy long time ago  · US before next visit     No follow-ups on file. The above issues were reviewed with the patient who understood and agreed with the plan. Thank you for allowing us to participate in the care of this patient. Please do not hesitate to contact us with any additional questions. Diagnosis Orders   1. Type 2 diabetes mellitus without complication, without long-term current use of insulin ([facility])  glipiZIDE (GLUCOTROL XL) 10 MG extended release tablet    Basic Metabolic Panel    Hemoglobin A1C    Microalbumin / Creatinine Urine Ratio    Blood Glucose Monitoring Suppl MISC    ONE TOUCH ULTRASOFT LANCETS MISC    blood glucose monitor strips    Ambulatory referral to Diabetic Education   2. Multinodular goiter  TSH without Reflex    T4, Free    US Thyroid   3. Vitamin D deficiency  Vitamin D 25 Hydroxy   4. Hyperthyroidism     5. Hypothyroidism, unspecified type  TSH without Reflex    T4, Free     Madison Villanueva MD  Endocrinologist, [facility] Diabetes Care and Endocrinology   [address] [facility], [address] [place] [address],[address] [id number]   Phone: [phone]  Fax: [phone]  --------------------------------------------  An electronic signature was used to authenticate this note. Surendra Gong MD on 5/27/2020 at 12:27 PM  Services were provided through a video synchronous discussion virtually to substitute for in-person clinic visit. Pursuant to the emergency declaration under the [address] [facility] waiver authority and the [employer] and [employer] Act, this Virtual  Visit was conducted, with patient's consent, to reduce the patient's risk of exposure to COVID-19 and provide continuity of care.

## 2020-05-28 ENCOUNTER — HOSPITAL ENCOUNTER (OUTPATIENT)
Age: 45
Discharge: HOME | End: 2020-05-28
Payer: COMMERCIAL

## 2020-05-28 LAB
ALBUMIN SERPL-MCNC: 4 G/DL (ref 3.5–5.2)
ALP BLD-CCNC: 86 U/L (ref 35–104)
ALT SERPL-CCNC: 51 U/L (ref 0–32)
ANION GAP SERPL CALCULATED.3IONS-SCNC: 11 MMOL/L (ref 7–16)
AST SERPL-CCNC: 34 U/L (ref 0–31)
BASOPHILS ABSOLUTE: 0.05 E9/L (ref 0–0.2)
BASOPHILS RELATIVE PERCENT: 0.6 % (ref 0–2)
BILIRUB SERPL-MCNC: 0.5 MG/DL (ref 0–1.2)
BUN BLDV-MCNC: 9 MG/DL (ref 6–20)
CALCIUM SERPL-MCNC: 9 MG/DL (ref 8.6–10.2)
CHLORIDE BLD-SCNC: 100 MMOL/L (ref 98–107)
CHOLESTEROL, TOTAL: 223 MG/DL (ref 0–199)
CO2: 24 MMOL/L (ref 22–29)
CREAT SERPL-MCNC: 0.5 MG/DL (ref 0.5–1)
EOSINOPHILS ABSOLUTE: 0.09 E9/L (ref 0.05–0.5)
EOSINOPHILS RELATIVE PERCENT: 1 % (ref 0–6)
GFR AFRICAN AMERICAN: >60
GFR NON-AFRICAN AMERICAN: >60 ML/MIN/1.73
GLUCOSE BLD-MCNC: 317 MG/DL (ref 74–99)
HBA1C MFR BLD: 8.9 % (ref 4–5.6)
HCT VFR BLD CALC: 41.6 % (ref 34–48)
HDLC SERPL-MCNC: 55 MG/DL
HEMOGLOBIN: 13.3 G/DL (ref 11.5–15.5)
IMMATURE GRANULOCYTES #: 0.03 E9/L
IMMATURE GRANULOCYTES %: 0.3 % (ref 0–5)
LDL CHOLESTEROL CALCULATED: ABNORMAL MG/DL (ref 0–99)
LYMPHOCYTES ABSOLUTE: 3.04 E9/L (ref 1.5–4)
LYMPHOCYTES RELATIVE PERCENT: 34.4 % (ref 20–42)
MCH RBC QN AUTO: 25.1 PG (ref 26–35)
MCHC RBC AUTO-ENTMCNC: 32 % (ref 32–34.5)
MCV RBC AUTO: 78.5 FL (ref 80–99.9)
MONOCYTES ABSOLUTE: 0.75 E9/L (ref 0.1–0.95)
MONOCYTES RELATIVE PERCENT: 8.5 % (ref 2–12)
NEUTROPHILS ABSOLUTE: 4.87 E9/L (ref 1.8–7.3)
NEUTROPHILS RELATIVE PERCENT: 55.2 % (ref 43–80)
PDW BLD-RTO: 17 FL (ref 11.5–15)
PLATELET # BLD: 322 E9/L (ref 130–450)
PMV BLD AUTO: 9.5 FL (ref 7–12)
POTASSIUM SERPL-SCNC: 3.9 MMOL/L (ref 3.5–5)
RBC # BLD: 5.3 E12/L (ref 3.5–5.5)
SODIUM BLD-SCNC: 135 MMOL/L (ref 132–146)
T4 FREE: 1.06 NG/DL (ref 0.93–1.7)
TOTAL CK: 71 U/L (ref 20–180)
TOTAL PROTEIN: 7.5 G/DL (ref 6.4–8.3)
TRIGL SERPL-MCNC: 418 MG/DL (ref 0–149)
TSH SERPL DL<=0.05 MIU/L-ACNC: 2.45 UIU/ML (ref 0.27–4.2)
VLDLC SERPL CALC-MCNC: ABNORMAL MG/DL
WBC # BLD: 8.8 E9/L (ref 4.5–11.5)

## 2020-05-28 PROCEDURE — 36415 COLL VENOUS BLD VENIPUNCTURE: CPT

## 2020-05-28 PROCEDURE — 80053 COMPREHEN METABOLIC PANEL: CPT

## 2020-05-28 PROCEDURE — 84439 ASSAY OF FREE THYROXINE: CPT

## 2020-05-28 PROCEDURE — 84443 ASSAY THYROID STIM HORMONE: CPT

## 2020-05-28 PROCEDURE — 82550 ASSAY OF CK (CPK): CPT

## 2020-05-28 PROCEDURE — 83036 HEMOGLOBIN GLYCOSYLATED A1C: CPT

## 2020-05-28 PROCEDURE — 80061 LIPID PANEL: CPT

## 2020-05-28 PROCEDURE — 85025 COMPLETE CBC W/AUTO DIFF WBC: CPT

## 2020-05-28 NOTE — RESULT ENCOUNTER NOTE
TG high, a1c high, lft's somewhat elevated. Low fat/low sugar/low carb diet. Keep fu to review in more detail.  , sooner prn

## 2020-06-02 ENCOUNTER — HOSPITAL ENCOUNTER (OUTPATIENT)
Dept: DIABETES SERVICES | Age: 45
Setting detail: THERAPIES SERIES
Discharge: HOME | End: 2020-06-02
Payer: COMMERCIAL

## 2020-06-02 ENCOUNTER — VIRTUAL VISIT (OUTPATIENT)
Dept: PRIMARY CARE CLINIC | Age: 45
End: 2020-06-02
Payer: COMMERCIAL

## 2020-06-02 VITALS — WEIGHT: 250 LBS | HEIGHT: 70 IN | TEMPERATURE: 96.7 F | BODY MASS INDEX: 35.79 KG/M2

## 2020-06-02 PROBLEM — E53.8 VITAMIN B12 DEFICIENCY: Status: ACTIVE | Noted: 2020-06-02

## 2020-06-02 PROCEDURE — G0108 DIAB MANAGE TRN  PER INDIV: HCPCS | Performed by: DIETITIAN, REGISTERED

## 2020-06-02 PROCEDURE — 99214 OFFICE O/P EST MOD 30 MIN: CPT | Performed by: FAMILY MEDICINE

## 2020-06-02 PROCEDURE — 3052F HG A1C>EQUAL 8.0%<EQUAL 9.0%: CPT | Performed by: FAMILY MEDICINE

## 2020-06-02 PROCEDURE — 97802 MEDICAL NUTRITION INDIV IN: CPT | Performed by: DIETITIAN, REGISTERED

## 2020-06-02 RX ORDER — ROSUVASTATIN CALCIUM 10 MG/1
10 TABLET, COATED ORAL DAILY
Qty: 30 TABLET | Refills: 1 | Status: SHIPPED
Start: 2020-06-02 | End: 2020-06-16 | Stop reason: ALTCHOICE

## 2020-06-02 SDOH — ECONOMIC STABILITY: FOOD INSECURITY: ADDITIONAL INFORMATION: NO

## 2020-06-02 ASSESSMENT — PATIENT HEALTH QUESTIONNAIRE - PHQ9
SUM OF ALL RESPONSES TO PHQ9 QUESTIONS 1 & 2: 4
SUM OF ALL RESPONSES TO PHQ QUESTIONS 1-9: 5
2. FEELING DOWN, DEPRESSED OR HOPELESS: 2
1. LITTLE INTEREST OR PLEASURE IN DOING THINGS: 2
SUM OF ALL RESPONSES TO PHQ QUESTIONS 1-9: 5

## 2020-06-02 NOTE — LETTER
[facility]) - Diabetes Education    2020     Re:     Halle Gasca  :  1975    Dear Dr. Shravan Garcia:    Thank you for referring your patient, Halle Gasca, to Diabetes Education Medical Nutrition Therapy. Your patient was here on 2020, and the following were addressed:        [x] Nutrition as Related to Diabetes    [x] Carbohydrate Counting     [x] Free Food List    [x] Combination Foods    [x] Fast Foods    [x] Weighing and measuring    [x] Meal Planning    [x] Using Food Labels - Label Reading  [] Diabetes Education Class Schedule    [x] Diet Instruction       [x]  Reinforced CHO counting and healthy eating guidelines. Instructed on 1500 calories 2-3 CHO per meal. Encouraged to test 2 hours post meal to see how BG affected by CHO foods. Exercise and stress-relieving tips provided. States she still has moments of depression but no suicidal thoughts. Able to verbalize back correct label information and meal ideas. Menu ideas provided. Upon completion of this session, the following evaluation of your patients progress was made:    ASSESSMENT:  [x]  verbalizes understanding of diet principles  [x]  understands the relationship between diet and health  [x]  identifies proper food choices  [x]  identifies needed diet changes  [x]  expresses intentions to comply with diet guidelines  [x]  able to provide correct answers when asked    GOAL:  [x]  to follow individual meal plan  [x]  to weigh and measure food portions  [x]  to call with further questions  []  to attend diabetes education class sessions 1 and/or 2    PATIENT EDUCATION MATERIALS PROVIDED:  [x]  plate carb counting meal plan  [x]  low fat guidelines  [x]  carb counting sheet  [x]  low sodium guidelines  []  Other:    PHQ- 9 Depression Screen Score:  5     0-4: Minimal Depression 15-19: Moderately Severe Depression   5-9: Mild Depression  20-27: Severe Depression           10-14:  Moderate Depression    COMMENTS:

## 2020-06-02 NOTE — PROGRESS NOTES
TeleMedicine Patient Consent    This visit was performed as a virtual video visit using a synchronous, two-way, audio-video telehealth technology platform. Patient identification was verified at the start of the visit, including the patient's telephone number and physical location. I discussed with the patient the nature of our telehealth visits, that:     1. Due to the nature of an audio- video modality, the only components of a physical exam that could be done are the elements supported by direct observation. 2. I would evaluate the patient and recommend diagnostics and treatments based on my assessment. 3. If it was felt that the patient should be evaluated in clinic or an emergency room setting, then they would be directed there. 4. Our sessions are not being recorded and that personal health information is protected. 5. Our team would provide follow up care in person if/when the patient needs it. Patient does agree to proceed with telemedicine consultation. Patient's location: home address in [facility]    Physician  location other address in [place]     Other people involved in call:   None    This visit was completed virtually using [employer]. me    2020    TELEHEALTH EVALUATION -- Audio/Visual (During SPHKI-57 public health emergency)    Chief Complaint   Patient presents with    Diabetes    Hypothyroidism    Depression    Anxiety    Sleep Apnea    Gastroesophageal Reflux    Discuss Labs           HPI:    [place] (:  1975) has requested an audio/video evaluation for the following concern(s):    Patient presents today for follow-up, saw endocrinology, they are going to do a thyroid ultrasound in August, started glipizide, sugars are doing better she states. Yeast infections  completely resolved. No other complaints or concerns.   Blood work abnormal.    Most Recent Labs  CBC  Lab Results   Component Value Date    WBC 8.8 2020    WBC 9.6 10/17/2019    WBC 9.2 10/17/2019    RBC 5.30 05/28/2020    RBC 5.44 10/17/2019    RBC 5.80 10/17/2019    HGB 13.3 05/28/2020    HGB 13.8 10/17/2019    HGB 14.3 10/17/2019    HCT 41.6 05/28/2020    HCT 43.5 10/17/2019    HCT 47.2 10/17/2019    MCV 78.5 05/28/2020    MCV 80.0 10/17/2019    MCV 81.4 10/17/2019     05/28/2020     10/17/2019     10/17/2019      CMP  Lab Results   Component Value Date     05/28/2020     02/22/2020     10/17/2019    K 3.9 05/28/2020    K 4.1 02/22/2020    K 4.2 10/17/2019    K 4.2 10/17/2019     05/28/2020     02/22/2020     10/17/2019    CO2 24 05/28/2020    CO2 25 02/22/2020    CO2 24 10/17/2019    ANIONGAP 11 05/28/2020    ANIONGAP 10 10/17/2019    ANIONGAP 19 10/17/2019    GLUCOSE 317 05/28/2020    GLUCOSE 253 02/22/2020    GLUCOSE 275 01/30/2020    BUN 9 05/28/2020    BUN 12 02/22/2020    BUN 7 10/17/2019    CREATININE 0.5 05/28/2020    CREATININE 0.66 02/22/2020    CREATININE 0.5 10/17/2019    LABGLOM >60 05/28/2020    LABGLOM 108 02/22/2020    LABGLOM >60 10/17/2019    LABGLOM >60 10/17/2019    GFRAA >60 05/28/2020    GFRAA >60 10/17/2019    GFRAA >60 10/17/2019    CALCIUM 9.0 05/28/2020    CALCIUM 8.9 02/22/2020    CALCIUM 9.1 10/17/2019    PROT 7.5 05/28/2020    PROT 7.6 10/17/2019    PROT 8.0 10/17/2019    LABALBU 4.0 05/28/2020    LABALBU 4.2 02/22/2020    LABALBU 3.9 10/17/2019    BILITOT 0.5 05/28/2020    BILITOT 0.9 02/22/2020    BILITOT 0.8 10/17/2019    ALKPHOS 86 05/28/2020    ALKPHOS 91 02/22/2020    ALKPHOS 90 10/17/2019    AST 34 05/28/2020    AST 38 02/22/2020    AST 33 10/17/2019    ALT 51 05/28/2020    ALT 49 02/22/2020    ALT 38 10/17/2019     A1C  Lab Results   Component Value Date    LABA1C 8.9 05/28/2020    LABA1C 9.7 02/25/2020    LABA1C 9.9 01/30/2020     TSH  Lab Results   Component Value Date    TSH 2.450 05/28/2020    TSH 0.72 02/22/2020    TSH 1.290 10/17/2019     FREET4  No results found for: [id number]  LIPID  Lab Results   Component Value medications were reviewed. Plan as above:  Counseled. Start Crestor. Blood work 1 month follow-up 5 weeks as well as blood work and follow-up in 3 months sooner as needed. Continue per specialist.  She will need to consider ACE inhibitor but she will defer at this time. Needs to follow through on hepatitis A/B vaccines, overdue on hepatitis B. Precautions reviewed. As long as symptoms steadily improve/resolve and medical conditions are following the expected course, FU as below, sooner PRN      Return in about 5 weeks (around 7/7/2020), or and 3mos, sooner prn. Time spent: Greater than Not billed by time      James Padilla is a 38 y.o. female being evaluated by a Virtual Visit (video visit) encounter to address concerns as mentioned above. A caregiver was present when appropriate. Due to this being a TeleHealth encounter (During [place] [address] public health emergency), evaluation of the following organ systems was limited: Vitals/Constitutional/EENT/Resp/CV/GI//MS/Neuro/Skin/Heme-Lymph-Imm. Pursuant to the emergency declaration under the [address] [place] [address], [address] [place] [address] authority and the [employer] and [employer] Act, this Virtual Visit was conducted with patient's (and/or legal guardian's) consent, to reduce the patient's risk of exposure to COVID-19 and provide necessary medical care. The patient (and/or legal guardian) has also been advised to contact this office for worsening conditions or problems, and seek emergency medical treatment and/or call 911 if deemed necessary. Services were provided through a video synchronous discussion virtually to substitute for in-person clinic visit. Patients are advised to check with insurance company to ensure coverage and to fully understand benefits and cost prior to any testing to try to avoid unexpected charges. This note was created with the assistance of voice recognition software.

## 2020-06-03 RX ORDER — LANCETS 30 GAUGE
EACH MISCELLANEOUS
Qty: 100 EACH | Refills: 5 | Status: SHIPPED | OUTPATIENT
Start: 2020-06-03

## 2020-06-03 RX ORDER — GLUCOSAMINE HCL/CHONDROITIN SU 500-400 MG
CAPSULE ORAL
Qty: 100 STRIP | Refills: 5 | Status: SHIPPED
Start: 2020-06-03 | End: 2021-07-22

## 2020-06-16 ENCOUNTER — OFFICE VISIT (OUTPATIENT)
Dept: FAMILY MEDICINE CLINIC | Age: 45
End: 2020-06-16
Payer: COMMERCIAL

## 2020-06-16 VITALS
WEIGHT: 247 LBS | HEIGHT: 70 IN | TEMPERATURE: 97.6 F | SYSTOLIC BLOOD PRESSURE: 126 MMHG | BODY MASS INDEX: 35.36 KG/M2 | HEART RATE: 70 BPM | RESPIRATION RATE: 16 BRPM | DIASTOLIC BLOOD PRESSURE: 80 MMHG | OXYGEN SATURATION: 99 %

## 2020-06-16 PROCEDURE — 99213 OFFICE O/P EST LOW 20 MIN: CPT | Performed by: PHYSICIAN ASSISTANT

## 2020-06-16 RX ORDER — MECLIZINE HYDROCHLORIDE 25 MG/1
25 TABLET ORAL 3 TIMES DAILY PRN
Qty: 15 TABLET | Refills: 0 | Status: SHIPPED | OUTPATIENT
Start: 2020-06-16 | End: 2020-06-26

## 2020-07-07 ENCOUNTER — OFFICE VISIT (OUTPATIENT)
Dept: PRIMARY CARE CLINIC | Age: 45
End: 2020-07-07
Payer: COMMERCIAL

## 2020-07-07 VITALS
HEART RATE: 105 BPM | TEMPERATURE: 99.7 F | WEIGHT: 251 LBS | BODY MASS INDEX: 36.01 KG/M2 | DIASTOLIC BLOOD PRESSURE: 82 MMHG | SYSTOLIC BLOOD PRESSURE: 120 MMHG | OXYGEN SATURATION: 98 %

## 2020-07-07 PROBLEM — J30.9 ALLERGIC RHINITIS: Status: ACTIVE | Noted: 2020-07-07

## 2020-07-07 PROBLEM — J01.90 ACUTE NON-RECURRENT SINUSITIS: Status: ACTIVE | Noted: 2020-07-07

## 2020-07-07 PROBLEM — R42 VERTIGO: Status: ACTIVE | Noted: 2020-07-07

## 2020-07-07 PROCEDURE — 99215 OFFICE O/P EST HI 40 MIN: CPT | Performed by: FAMILY MEDICINE

## 2020-07-07 PROCEDURE — 3052F HG A1C>EQUAL 8.0%<EQUAL 9.0%: CPT | Performed by: FAMILY MEDICINE

## 2020-07-07 RX ORDER — CEFDINIR 300 MG/1
300 CAPSULE ORAL 2 TIMES DAILY
Qty: 20 CAPSULE | Refills: 0 | Status: SHIPPED | OUTPATIENT
Start: 2020-07-07 | End: 2020-07-17

## 2020-07-07 RX ORDER — ROSUVASTATIN CALCIUM 10 MG/1
10 TABLET, COATED ORAL DAILY
Qty: 30 TABLET | Refills: 3 | Status: SHIPPED
Start: 2020-07-07 | End: 2022-10-25

## 2020-07-07 RX ORDER — PREDNISONE 10 MG/1
TABLET ORAL
Qty: 12 TABLET | Refills: 0 | Status: SHIPPED | OUTPATIENT
Start: 2020-07-07 | End: 2020-07-13

## 2020-07-07 NOTE — ASSESSMENT & PLAN NOTE
Counseled. Hold antihistamine during sinusitis, discussed nasal saline coolmist vaporizer avoidance, nasal steroid risk benefits-1 sinusitis resolves may re-add Claritin or equivalent daily as needed. Consider Singulair.

## 2020-07-07 NOTE — PROGRESS NOTES
19  Maycol Romero : 1975 Sex: female  Age: 38 y.o. Chief Complaint   Patient presents with    Diabetes     did not have labs done     Hyperlipidemia    Dizziness     3wks    Joint Pain     \"whole life\"       HPI  HPI:      Patient is here today for multiple, including follow-up hyperlipidemia/Crestor, vertigo, chronic joint pain, wondering about when hepatitis a #2 is due. She was originally scheduled to review her labs after initiation of Crestor to follow-up on hyperlipidemia but she not get this done yet. She is subjectively tolerating her Crestor. She has labs ordered for her endocrinologist as well she states. Sugars have been better controlled. For 3 weeks she has been experiencing vertigo with movement of her head, developed increasing sinus pressure thick rhinorrhea postnasal drainage, no fever chills malaise lightheadedness syncope near syncope. No exertional component chest pain palpitation shortness of breath cough abnormal smell taste GI symptoms nausea vomiting change in bowels or otherwise. Meclizine does alleviate the symptoms. The vertigo is improving but the sinus pressure is worsening. Covid-19 precautions reviewed. She does not feel she has this and does not want tested. Has had longstanding chronic joint pain for as long she can remember. She saw a rheumatologist, states she had a negative work-up, denies swelling or stiffness, failed methotrexate with significant side effects    She also complains of chronic anxiety and depression although she states this is stable on Cymbalta, tolerating it well. Absolutely denies SI/HI. Some increased stress as her  is going to require a partial colectomy. States she is having recurrent yeast infections that resolved after stopping the Jardiance      She states groin abscess resolved, declines to have me assess  Long overdue on GYN, over 10yrs.  Emphasized need asap and she'll do on her own        She has chronic allergies-takes Claritin. Has not been taking nasal steroid.       Most Recent Labs  CBC  Lab Results   Component Value Date    WBC 8.8 05/28/2020    WBC 9.6 10/17/2019    WBC 9.2 10/17/2019    RBC 5.30 05/28/2020    RBC 5.44 10/17/2019    RBC 5.80 10/17/2019    HGB 13.3 05/28/2020    HGB 13.8 10/17/2019    HGB 14.3 10/17/2019    HCT 41.6 05/28/2020    HCT 43.5 10/17/2019    HCT 47.2 10/17/2019    MCV 78.5 05/28/2020    MCV 80.0 10/17/2019    MCV 81.4 10/17/2019     05/28/2020     10/17/2019     10/17/2019      CMP  Lab Results   Component Value Date     05/28/2020     02/22/2020     10/17/2019    K 3.9 05/28/2020    K 4.1 02/22/2020    K 4.2 10/17/2019    K 4.2 10/17/2019     05/28/2020     02/22/2020     10/17/2019    CO2 24 05/28/2020    CO2 25 02/22/2020    CO2 24 10/17/2019    ANIONGAP 11 05/28/2020    ANIONGAP 10 10/17/2019    ANIONGAP 19 10/17/2019    GLUCOSE 317 05/28/2020    GLUCOSE 253 02/22/2020    GLUCOSE 275 01/30/2020    BUN 9 05/28/2020    BUN 12 02/22/2020    BUN 7 10/17/2019    CREATININE 0.5 05/28/2020    CREATININE 0.66 02/22/2020    CREATININE 0.5 10/17/2019    LABGLOM >60 05/28/2020    LABGLOM 108 02/22/2020    LABGLOM >60 10/17/2019    LABGLOM >60 10/17/2019    GFRAA >60 05/28/2020    GFRAA >60 10/17/2019    GFRAA >60 10/17/2019    CALCIUM 9.0 05/28/2020    CALCIUM 8.9 02/22/2020    CALCIUM 9.1 10/17/2019    PROT 7.5 05/28/2020    PROT 7.6 10/17/2019    PROT 8.0 10/17/2019    LABALBU 4.0 05/28/2020    LABALBU 4.2 02/22/2020    LABALBU 3.9 10/17/2019    BILITOT 0.5 05/28/2020    BILITOT 0.9 02/22/2020    BILITOT 0.8 10/17/2019    ALKPHOS 86 05/28/2020    ALKPHOS 91 02/22/2020    ALKPHOS 90 10/17/2019    AST 34 05/28/2020    AST 38 02/22/2020    AST 33 10/17/2019    ALT 51 05/28/2020    ALT 49 02/22/2020    ALT 38 10/17/2019     A1C  Lab Results   Component Value Date    LABA1C 8.9 05/28/2020    LABA1C 9.7 02/25/2020    LABA1C 9.9 01/30/2020     TSH  Lab Results   Component Value Date    TSH 2.450 05/28/2020    TSH 0.72 02/22/2020    TSH 1.290 10/17/2019     FREET4  No results found for: X0IDEST  LIPID  Lab Results   Component Value Date    CHOL 223 05/28/2020    CHOL 240 02/22/2020    CHOL 214 10/17/2019    HDL 55 05/28/2020    HDL 61 02/22/2020    HDL 56 10/17/2019    LDLCALC - 05/28/2020    LDLCALC 152 02/22/2020    LDLCALC 122 10/17/2019    TRIG 418 05/28/2020    TRIG 142 02/22/2020    TRIG 178 10/17/2019    CHOLHDLRATIO 3.9 02/22/2020     VITAMIN D  No results found for: VITD25  MAGNESIUM  No results found for: MG   PHOS  No results found for: PHOS   DIANELYS   No results found for: DIANELYS  RHEUMATOID FACTOR  No results found for: RF  PSA  No results found for: PSA   HEPATITIS C  No results found for: HCVABI  HIV  No results found for: EKA9NCQ, HIV1QT  UA  Lab Results   Component Value Date    COLORU Yellow 02/22/2020    COLORU Yellow 11/13/2019    COLORU Yellow 10/17/2019    COLORU Yellow 07/26/2019    CLARITYU Turbid 02/22/2020    CLARITYU Cloudy 11/13/2019    CLARITYU TURBID 10/17/2019    CLARITYU CLOUDY 07/26/2019    GLUCOSEU 3+ 02/22/2020    GLUCOSEU 500 11/13/2019    GLUCOSEU >=1000 10/17/2019    GLUCOSEU 500 07/26/2019    BILIRUBINUR Negative 02/22/2020    BILIRUBINUR Negative 11/13/2019    BILIRUBINUR Negative 10/17/2019    BILIRUBINUR Negative 07/26/2019    BILIRUBINUR Negative 01/10/2016    [address]  02/22/2020      Comment:      trace    KETUA Negative 11/13/2019    KETUA TRACE 10/17/2019    KETUA Negative 07/26/2019    SPECGRAV 1.015 11/13/2019    SPECGRAV >=1.030 10/17/2019    SPECGRAV >=1.030 07/26/2019    SPECGRAV >=1.030 01/10/2016    BLOODU Negative 02/22/2020    BLOODU small 11/13/2019    BLOODU Negative 10/17/2019    BLOODU Negative 07/26/2019    PHUR  02/22/2020      Comment:      <5.0    PHUR 5.5 11/13/2019    PHUR 6.0 10/17/2019    PHUR 5.5 07/26/2019    PROTEINU Negative 02/22/2020    PROTEINU Negative 11/13/2019    PROTEINU MISC, Blood glucose meter - use as directed, Disp: 1 each, Rfl: 1    blood glucose monitor strips, Use to check blood sugar 3 times/day, Disp: 100 strip, Rfl: 5    Lancets MISC, Use to test blood sugar 3 times a day, Disp: 100 each, Rfl: 5    aspirin 81 MG EC tablet, Take 81 mg by mouth daily, Disp: , Rfl:     B Complex-C (SUPER B COMPLEX/VITAMIN C PO), Take by mouth daily, Disp: , Rfl:     loratadine (CLARITIN) 10 MG tablet, Take 10 mg by mouth daily, Disp: , Rfl:     glipiZIDE (GLUCOTROL XL) 10 MG extended release tablet, Take 1 tablet by mouth daily, Disp: 30 tablet, Rfl: 5    ONE TOUCH ULTRASOFT LANCETS MISC, Test 2 times/day, Disp: 100 each, Rfl: 3    metFORMIN (GLUCOPHAGE-XR) 500 MG extended release tablet, Take 2 tablets by mouth 2 times daily, Disp: 360 tablet, Rfl: 3    omeprazole (PRILOSEC) 20 MG delayed release capsule, Take 1 capsule by mouth daily, Disp: 90 capsule, Rfl: 3    DULoxetine (CYMBALTA) 60 MG extended release capsule, Take 1 capsule by mouth daily, Disp: 90 capsule, Rfl: 3    levothyroxine (SYNTHROID) 75 MCG tablet, Take 1 tablet by mouth Daily, Disp: 90 tablet, Rfl: 3    SITagliptin (JANUVIA) 100 MG tablet, Take 1 tablet by mouth daily, Disp: 90 tablet, Rfl: 3    Cholecalciferol (VITAMIN D3) 2000 units CAPS, Take 2,000 Units by mouth daily , Disp: , Rfl:   Allergies   Allergen Reactions    Celecoxib Hives       Past Medical History:   Diagnosis Date    Depression     Diabetes mellitus (HCC)     GERD (gastroesophageal reflux disease)     Joint pain     Thyroid disease      Past Surgical History:   Procedure Laterality Date     SECTION      CHOLECYSTECTOMY      HYSTERECTOMY       No family history on file.   Social History     Tobacco Use    Smoking status: Never Smoker    Smokeless tobacco: Never Used   Substance Use Topics    Alcohol use: No    Drug use: No      Social History     Social History Narrative    PMH:    Problem List: Eruption, Infestation by Sarcoptes scabiei sherly hominis, Dysthymic disorder, Peptic reflux    disease, Dysthymia, Hypothyroidism, Adult health examination        Health Maintenance:    Mammogram - (10/1/2018)    Mammogram Screening - (10/1/2018)    Influenza Vaccination - (9/1/2015)        Medical Problems:    vitamin D def, Asthma    Hypothyroidism - partial thyroidectomy (half of left lobe); benign nodule    Anxiety    GERD - EGD 2011    Seasonal Allergies, Type 2 Diabetes        Surgical Hx:    C/S    Partial Hysterectomy - left ovary remains (bleeding issues)    Benita        FH:    Father:    . (Hx)    Mother:    . (Hx)    Mom - thyroid cancer, melanoma    Dad - depression    cousin - melanoma        SH:    . (Marital)Stay at home;    3 kids (8,7,5 as of 2014). No complications during pregnancy. Luly Powell ; works at [employer]    Personal Habits: Cigarette Use: Nonsmoker. Alcohol: Denies alcohol use. Vitals:    07/07/20 0828   BP: 120/82   Pulse: 105   Temp: 99.7 °F (37.6 °C)   SpO2: 98%   Weight: 251 lb (113.9 kg)      Wt Readings from Last 3 Encounters:   07/07/20 251 lb (113.9 kg)   06/16/20 247 lb (112 kg)   06/02/20 250 lb (113.4 kg)        Physical Exam  Exam:  Const: Appears comfortable. No signs of acute distress present. Head/Face: Atraumatic on inspection. Eyes: EOMI in both eyes. No discharge from the eyes. PERRL. Sclerae clear. ENMT: Auditory canals normal. Tympanic membranes: intact and translucent. External nose WNL. Nasal mucosa is boggy. Oropharynx: No erythema or exudate. Posterior pharynx is thick postnasal drainage no exudate   neck: Supple. Palpation reveals no adenopathy. No masses appreciated. No JVD. Carotids: no  bruits. Resp: Respirations are unlabored. Clear to auscultation. No rales, rhonchi or wheezes appreciated  over the lungs bilaterally. CV: Rate is regular. Rhythm is regular. No gallop or rubs. No heart murmur appreciated. Extremities: No clubbing, cyanosis, or edema.  No calf inflammation or current use of insulin ([facility] [address]  Tolerating medications., On metformin with precautions reviewed including lactic  acidosis diarrhea, recall reviewed, on Januvia as well precautions including pancreatic issues and thyroid cancer    On Glucotrol with precautions including hypoglycemia. Watch ambulatory. If out  of range, let us know. Stressed importance of daily foot examinations, regular eye  examinations etc. Micro-and macrovascular complications reviewed, hyper or  hypoglycemic precautions reviewed . cont lifestyle.      hemoglobin A1c goals reviewed. Did not tolerate Jardiance because of recurrent yeast infections. Continue per Dr. Danny Bacon. Will consider ACE inhibitor but declines at this time.     Liver function study, abnormal  Counseled extensively. Differential reviewed, including serious etiologies. h/o fatty  liver. Precautions reviewed. Lifestyle modification appropriate diet and weight loss  reviewed. Risks of even this leading to cirrhosis reviewed. Other than basic  monitoring on interested in other evaluation or treatment, in-depth blood work,  imaging or otherwise. . normalized, HEP B and C neg (she started hepatitis A and B series 2/25/2020, overdue on hepatitis B #2-she left before this and we will call her back, hepatitis A #2 is due after October 25)    Watch closely with the initiation of statin last time.             Inflammatory polyarthropathy (HCC)  Chronic joint pain. Recheck labs. Failed mtx. did not tolerated. gi intolerance. bw neg. recommend cont per dr dick/rheumatology but declines follow-up now. Relatively stable on Cymbalta although slightly increased recently. Consider Lyrica. Risk benefits reviewed.                   Abscess of groin, left  Counseled. At this point wishes to move forward with doxycycline with precautions including C. difficile, risk benefits of probiotic reviewed, Bactroban with precautions. Declines procedural intervention. Resolved.   Declines to have Declines  urology referral. asx.        Mixed hyperlipidemia  Goals reviewed. Seb Hernandez is on Crestor, subjectively tolerating but not get blood work yet. Awaiting this. Importance of doing so reviewed.  Lifestyle modification reviewed. risk of  hyperlipidemia reviewed monitor  Counseled extensively.        Gastroesophageal reflux disease without esophagitis  Asymptomatic as long as taking therapy.    Breakthrough off medication.  She is tolerating omeprazole 20 mg daily, previously was on 40 mg daily.  Declines further testing and understands  risk of masking underlying etiologies. Risks of meds also reviewed incl RI/Electrolye  malabsorption.  EGD 2011. Showed gerd    Vitamin B12 deficiency  History of. Appropriate supplementation reviewed. Risk of deficiency reviewed. Monitor. Acute non-recurrent sinusitis  Counseled. Differential reviewed. Antibiotic as per EMR, standard precautions reviewed including C. Difficile. R/B probiotics reviewed. Mucinex as directed with precautions. Potential interactions reviewed. Proper hydration reviewed. Coolmist vaporizer as directed. Mono precautions reviewed. Counseled on nasal saline. Counseled on nasal steroid. Omnicef with precautions, prednisone with precautions including hyperglycemia, not to take with NSAIDs. Vertigo  Counseled extensively. Differential reviewed, including serious etiologies. Possibly secondary to sinusitis. Counseled labyrinthitis, BPPV as well as intracranial neoplasm, vertebrobasilar insufficiency, Ménière's etc.  Denies hearing loss. She gets good results with Antivert, may take as needed with precautions including sedation not to drive with this or vertigo. If no resolution with Omnicef and prednisone will need further evaluation, defers now. Allergic rhinitis  Counseled.   Hold antihistamine during sinusitis, discussed nasal saline coolmist vaporizer avoidance, nasal steroid risk benefits-1 sinusitis resolves may re-add Claritin or equivalent daily as needed. Consider Singulair. No flowsheet data found. Plan as above. Counseled extensively and differential diagnoses relevant to above were reviewed, including serious etiologies. Side effects and interactions of medications were reviewed. Extensive discussion today. Over 40 minutes spent with the patient, over 50% of the time counseling. Refilled medications. Prescribed Omnicef and prednisone, recommend she start nasal steroid. Added labs and she is to get the previously ordered labs as well. As long symptoms steadily improve/resolve follow-up 3 weeks sooner as needed. Over 40 minutes  spent with the patient in reviewing records, reviewing with patient/family, counseling, ordering,  prescribing, completing h&p, etc., with over 50% of the time spent face to face counseling. As long as symptoms steadily improve/resolve, and medical conditions follow the expected course, FU as below, sooner PRN. Return in about 3 weeks (around 7/28/2020), or if symptoms worsen or fail to improve, for Multiple. Signs and symptoms to watch for discussed, serious signs and symptoms reviewed. ER if any. Terrie Griffin MD    Patients are advised to check with insurance company to ensure coverage and to fully understand benefits and cost prior to any testing. This note was created with the assistance of voice recognition software. Document was reviewed however may contain grammatical errors.

## 2020-07-17 ENCOUNTER — HOSPITAL ENCOUNTER (OUTPATIENT)
Age: 45
Discharge: HOME | End: 2020-07-17
Payer: COMMERCIAL

## 2020-07-17 LAB
ALBUMIN SERPL-MCNC: 4 G/DL (ref 3.5–5.2)
ALP BLD-CCNC: 82 U/L (ref 35–104)
ALT SERPL-CCNC: 51 U/L (ref 0–32)
ANION GAP SERPL CALCULATED.3IONS-SCNC: 12 MMOL/L (ref 7–16)
AST SERPL-CCNC: 41 U/L (ref 0–31)
BILIRUB SERPL-MCNC: 1.1 MG/DL (ref 0–1.2)
BUN BLDV-MCNC: 11 MG/DL (ref 6–20)
CALCIUM SERPL-MCNC: 8.8 MG/DL (ref 8.6–10.2)
CHLORIDE BLD-SCNC: 100 MMOL/L (ref 98–107)
CHOLESTEROL, TOTAL: 181 MG/DL (ref 0–199)
CO2: 24 MMOL/L (ref 22–29)
CREAT SERPL-MCNC: 0.5 MG/DL (ref 0.5–1)
GFR AFRICAN AMERICAN: >60
GFR NON-AFRICAN AMERICAN: >60 ML/MIN/1.73
GLUCOSE BLD-MCNC: 219 MG/DL (ref 74–99)
HDLC SERPL-MCNC: 58 MG/DL
LDL CHOLESTEROL CALCULATED: 93 MG/DL (ref 0–99)
POTASSIUM SERPL-SCNC: 3.8 MMOL/L (ref 3.5–5)
RHEUMATOID FACTOR: <10 IU/ML (ref 0–13)
SODIUM BLD-SCNC: 136 MMOL/L (ref 132–146)
TOTAL CK: 55 U/L (ref 20–180)
TOTAL PROTEIN: 7.2 G/DL (ref 6.4–8.3)
TRIGL SERPL-MCNC: 150 MG/DL (ref 0–149)
VLDLC SERPL CALC-MCNC: 30 MG/DL

## 2020-07-17 PROCEDURE — 82550 ASSAY OF CK (CPK): CPT

## 2020-07-17 PROCEDURE — 36415 COLL VENOUS BLD VENIPUNCTURE: CPT

## 2020-07-17 PROCEDURE — 86038 ANTINUCLEAR ANTIBODIES: CPT

## 2020-07-17 PROCEDURE — 86039 ANTINUCLEAR ANTIBODIES (ANA): CPT

## 2020-07-17 PROCEDURE — 80061 LIPID PANEL: CPT

## 2020-07-17 PROCEDURE — 86618 LYME DISEASE ANTIBODY: CPT

## 2020-07-17 PROCEDURE — 80053 COMPREHEN METABOLIC PANEL: CPT

## 2020-07-17 PROCEDURE — 86200 CCP ANTIBODY: CPT

## 2020-07-17 PROCEDURE — 86431 RHEUMATOID FACTOR QUANT: CPT

## 2020-07-17 PROCEDURE — 86225 DNA ANTIBODY NATIVE: CPT

## 2020-07-20 LAB — ANTI DNA DOUBLE STRANDED: NEGATIVE

## 2020-07-21 LAB
ANA PATTERN: ABNORMAL
ANA TITER: ABNORMAL
ANTI-NUCLEAR ANTIBODY (ANA): POSITIVE
LYME, EIA: 0.38 LIV (ref 0–1.2)

## 2020-07-21 NOTE — RESULT ENCOUNTER NOTE
DIANELYS is elevated at 1:160. I know she seen rheumatology in the past.  She should see again. Check if she has a preference,?   Dr. Tray Noel.,  Keep follow-up to review more detail, sooner as needed

## 2020-07-22 LAB — CCP IGG ANTIBODIES: 5 UNITS (ref 0–19)

## 2020-07-28 ENCOUNTER — OFFICE VISIT (OUTPATIENT)
Dept: PRIMARY CARE CLINIC | Age: 45
End: 2020-07-28
Payer: COMMERCIAL

## 2020-07-28 VITALS
BODY MASS INDEX: 35.93 KG/M2 | SYSTOLIC BLOOD PRESSURE: 118 MMHG | WEIGHT: 251 LBS | TEMPERATURE: 97.8 F | DIASTOLIC BLOOD PRESSURE: 86 MMHG | OXYGEN SATURATION: 98 % | RESPIRATION RATE: 20 BRPM | HEIGHT: 70 IN | HEART RATE: 89 BPM

## 2020-07-28 PROBLEM — R76.8 ELEVATED ANTINUCLEAR ANTIBODY (ANA) LEVEL: Status: ACTIVE | Noted: 2020-07-28

## 2020-07-28 PROCEDURE — 90746 HEPB VACCINE 3 DOSE ADULT IM: CPT | Performed by: FAMILY MEDICINE

## 2020-07-28 PROCEDURE — 3052F HG A1C>EQUAL 8.0%<EQUAL 9.0%: CPT | Performed by: FAMILY MEDICINE

## 2020-07-28 PROCEDURE — 90471 IMMUNIZATION ADMIN: CPT | Performed by: FAMILY MEDICINE

## 2020-07-28 PROCEDURE — 99214 OFFICE O/P EST MOD 30 MIN: CPT | Performed by: FAMILY MEDICINE

## 2020-07-28 NOTE — PROGRESS NOTES
19  Chika Lawrence : 1975 Sex: female  Age: 38 y.o. Chief Complaint   Patient presents with    Dizziness     follow up    Results         HPI:      Presents today for follow-up of multiple including labs, vertigo. Vertigo significantly better nearly resolved. Sometimes feels it with certain motions of her head. Declines other evaluation treatment now. Sinus symptoms resolved. Has had longstanding chronic joint pain for as long she can remember. She saw a rheumatologist, states she had a negative work-up, denies swelling or stiffness, failed methotrexate with significant side effects. DIANELYS still elevated at 1-1 60. Willing to see rheumatologist again. She also complains of chronic anxiety and depression although she states this is stable on Cymbalta, tolerating it well. Absolutely denies SI/HI. Some increased stress as her  is going to require a partial colectomy. Overall doing well    States she is having recurrent yeast infections that resolved after stopping the Jardiance      She states groin abscess resolved, declines to have me assess  Long overdue on GYN, over 10yrs. Emphasized need asap and she'll do on her own        She has chronic allergies-takes Claritin. And nasal steroid. Discussed alternatives to Claritin.     Due for hepatitis B #2      Blood work reviewed, CMP normal except glucose 219, she follows actively with the endocrinologist, sugars are better since she states, HDL 58 triglyceride 150 LDL 93, ALT went from 51-51 AST 34-41, DIANELYS still 1-1 60 speckled, rheumatoid factor negative as well as CCP antibody and dsDNA antibody    Most Recent Labs  CBC  Lab Results   Component Value Date    WBC 8.8 2020    WBC 9.6 10/17/2019    WBC 9.2 10/17/2019    RBC 5.30 2020    RBC 5.44 10/17/2019    RBC 5.80 10/17/2019    HGB 13.3 2020    HGB 13.8 10/17/2019    HGB 14.3 10/17/2019    HCT 41.6 2020    HCT 43.5 10/17/2019    HCT 47.2 10/17/2019    MCV 02/22/2020    TRIG 150 07/17/2020    TRIG 418 05/28/2020    TRIG 142 02/22/2020    CHOLHDLRATIO 3.9 02/22/2020     VITAMIN D  No results found for: VITD25  MAGNESIUM  No results found for: MG   PHOS  No results found for: PHOS   DIANELYS   Lab Results   Component Value Date    DIANELYS POSITIVE 07/17/2020     RHEUMATOID FACTOR  Lab Results   Component Value Date    RF <10 07/17/2020     PSA  No results found for: PSA   HEPATITIS C  No results found for: HCVABI  HIV  No results found for: TMA3XQT, HIV1QT  UA  Lab Results   Component Value Date    COLORU Yellow 02/22/2020    COLORU Yellow 11/13/2019    COLORU Yellow 10/17/2019    COLORU Yellow 07/26/2019    CLARITYU Turbid 02/22/2020    CLARITYU Cloudy 11/13/2019    CLARITYU TURBID 10/17/2019    CLARITYU CLOUDY 07/26/2019    GLUCOSEU 3+ 02/22/2020    GLUCOSEU 500 11/13/2019    GLUCOSEU >=1000 10/17/2019    GLUCOSEU 500 07/26/2019    BILIRUBINUR Negative 02/22/2020    BILIRUBINUR Negative 11/13/2019    BILIRUBINUR Negative 10/17/2019    BILIRUBINUR Negative 07/26/2019    BILIRUBINUR Negative 01/10/2016    Marie Love  02/22/2020      Comment:      trace    KETUA Negative 11/13/2019    KETUA TRACE 10/17/2019    KETUA Negative 07/26/2019    SPECGRAV 1.015 11/13/2019    SPECGRAV >=1.030 10/17/2019    SPECGRAV >=1.030 07/26/2019    SPECGRAV >=1.030 01/10/2016    BLOODU Negative 02/22/2020    BLOODU small 11/13/2019    BLOODU Negative 10/17/2019    BLOODU Negative 07/26/2019    PHUR  02/22/2020      Comment:      <5.0    PHUR 5.5 11/13/2019    PHUR 6.0 10/17/2019    PHUR 5.5 07/26/2019    PROTEINU Negative 02/22/2020    PROTEINU Negative 11/13/2019    PROTEINU Negative 10/17/2019    PROTEINU Negative 07/26/2019    UROBILINOGEN 0.2 10/17/2019    UROBILINOGEN 0.2 07/26/2019    UROBILINOGEN 0.2 01/10/2016    NITRU Negative 02/22/2020    NITRU Negative 10/17/2019    NITRU POSITIVE 07/26/2019    LEUKOCYTESUR Negative 02/22/2020    LEUKOCYTESUR trace 11/13/2019    LEUKOCYTESUR Negative 10/17/2019    LEUKOCYTESUR SMALL 07/26/2019     Urine Micro/Albumin Ratio  Lab Results   Component Value Date    MALBCR 118 02/22/2020    MALBCR 11.6 10/17/2019    MALBCR 6.3 07/26/2019       Review of Systems   Neurological: Positive for dizziness. ROS:  Const: Denies chills, fever, malaise and sweats. Eyes: Denies discharge, pain, redness and visual disturbance. ENMT: Denies earaches, other ear symptoms. Denies nasal or sinus symptoms other than stated  above. Denies mouth and tongue lesions and sore throat. CV: Denies chest discomfort, pain; diaphoresis,   edema, lightheadedness, orthopnea,  palpitations, syncope and near syncopal episode or any exertional symptoms  Resp: Denies cough, hemoptysis, pleuritic pain, SOB, sputum production and wheezing. GI: Denies abdominal pain, change in bowel habits, hematochezia, melena, nausea and vomiting. : Denies urinary symptoms including dysuria , urgency, frequency or hematuria.   Musculo:  chronic joint pain  Skin: Denies bruising and rash    Neuro: Denies headache, numbness, stiff neck, tingling and focal weakness slurred speech or facial  droop, vertigo as above, greatly improved  Hema/Lymph: Denies bleeding/bruising tendency and enlarged lymph nodes        Current Outpatient Medications:     rosuvastatin (CRESTOR) 10 MG tablet, Take 1 tablet by mouth daily, Disp: 30 tablet, Rfl: 3    Blood Glucose Monitoring Suppl MISC, Blood glucose meter - use as directed, Disp: 1 each, Rfl: 1    blood glucose monitor strips, Use to check blood sugar 3 times/day, Disp: 100 strip, Rfl: 5    Lancets MISC, Use to test blood sugar 3 times a day, Disp: 100 each, Rfl: 5    aspirin 81 MG EC tablet, Take 81 mg by mouth daily, Disp: , Rfl:     B Complex-C (SUPER B COMPLEX/VITAMIN C PO), Take by mouth daily, Disp: , Rfl:     loratadine (CLARITIN) 10 MG tablet, Take 10 mg by mouth daily, Disp: , Rfl:     glipiZIDE (GLUCOTROL XL) 10 MG extended release tablet, Take 1 tablet by mouth daily, Disp: 30 tablet, Rfl: 5    ONE TOUCH ULTRASOFT LANCETS MISC, Test 2 times/day, Disp: 100 each, Rfl: 3    metFORMIN (GLUCOPHAGE-XR) 500 MG extended release tablet, Take 2 tablets by mouth 2 times daily, Disp: 360 tablet, Rfl: 3    omeprazole (PRILOSEC) 20 MG delayed release capsule, Take 1 capsule by mouth daily, Disp: 90 capsule, Rfl: 3    DULoxetine (CYMBALTA) 60 MG extended release capsule, Take 1 capsule by mouth daily, Disp: 90 capsule, Rfl: 3    levothyroxine (SYNTHROID) 75 MCG tablet, Take 1 tablet by mouth Daily, Disp: 90 tablet, Rfl: 3    SITagliptin (JANUVIA) 100 MG tablet, Take 1 tablet by mouth daily, Disp: 90 tablet, Rfl: 3    Cholecalciferol (VITAMIN D3) 2000 units CAPS, Take 2,000 Units by mouth daily , Disp: , Rfl:   Allergies   Allergen Reactions    Celecoxib Hives       Past Medical History:   Diagnosis Date    Depression     Diabetes mellitus (HCC)     GERD (gastroesophageal reflux disease)     Joint pain     Thyroid disease      Past Surgical History:   Procedure Laterality Date     SECTION      CHOLECYSTECTOMY      HYSTERECTOMY       No family history on file.   Social History     Tobacco Use    Smoking status: Never Smoker    Smokeless tobacco: Never Used   Substance Use Topics    Alcohol use: No    Drug use: No      Social History     Social History Narrative    PMH:    Problem List: Eruption, Infestation by Sarcoptes scabiei sherly hominis, Dysthymic disorder, Peptic reflux    disease, Dysthymia, Hypothyroidism, Adult health examination        Health Maintenance:    Mammogram - (10/1/2018)    Mammogram Screening - (10/1/2018)    Influenza Vaccination - (2015)        Medical Problems:    vitamin D def, Asthma    Hypothyroidism - partial thyroidectomy (half of left lobe); benign nodule    Anxiety    GERD - EGD     Seasonal Allergies, Type 2 Diabetes        Surgical Hx:    C/S    Partial Hysterectomy - left ovary remains (bleeding issues)    Benita FH:    Father:    . (Hx)    Mother:    . (Hx)    Mom - thyroid cancer, melanoma    Dad - depression    cousin - melanoma        SH:    . (Marital)Stay at home;    3 kids (8,7,5 as of 2014). No complications during pregnancy. Linda Mcneil ; works at [employer]    Personal Habits: Cigarette Use: Nonsmoker. Alcohol: Denies alcohol use. Vitals:    07/28/20 1002   BP: 118/86   Pulse: 89   Resp: 20   Temp: 97.8 °F (36.6 °C)   TempSrc: Temporal   SpO2: 98%   Weight: 251 lb (113.9 kg)   Height: 5' 10\" (1.778 m)      Wt Readings from Last 3 Encounters:   07/28/20 251 lb (113.9 kg)   07/07/20 251 lb (113.9 kg)   06/16/20 247 lb (112 kg)        Physical Exam  Exam:  Const: Appears comfortable. No signs of acute distress present. Head/Face: Atraumatic on inspection. Eyes: EOMI in both eyes. No discharge from the eyes. PERRL. Sclerae clear. ENMT: Auditory canals normal. Tympanic membranes: intact and translucent. External nose WNL. Nasal mucosa is boggy. Oropharynx: No erythema or exudate. Posterior pharynx is watery postnasal drainage no exudate   neck: Supple. Palpation reveals no adenopathy. No masses appreciated. No JVD. Carotids: no  bruits. Resp: Respirations are unlabored. Clear to auscultation. No rales, rhonchi or wheezes appreciated  over the lungs bilaterally. CV: Rate is regular. Rhythm is regular. No gallop or rubs. No heart murmur appreciated. Extremities: No clubbing, cyanosis, or edema. No calf inflammation or tenderness. Abdomen: Bowel sounds are normoactive. Abdomen is soft, nontender, and nondistended. No  abdominal masses. No palpable hepatosplenomegaly. Lymph: No palpable or visible regional lymphadenopathy. Musculoskeletal: no acute joint inflammation. Skin: Dry and warm with no rash. Neuro: Alert and oriented. Affect: appropriate. Upper Extremities: 5/5 bilaterally. Lower Extremities:  5/5 bilaterally. Sensation intact to light touch. Reflexes: DTR's are symmetric and 2+ bilaterally. .  Cranial Nerves: Cranial nerves grossly intact. No appreciable nystagmus and no vertigo present at this moment. Assessment and Plan:   Diagnosis Orders   1. Inflammatory polyarthropathy ([address]  Amb External Referral To Rheumatology   2. Elevated antinuclear antibody (DIANELYS) level  Amb External Referral To Rheumatology   3. Vertigo     4. Health maintenance examination  Hep B Vaccine Adult (ENGERIX B)   5. Acute non-recurrent sinusitis, unspecified location     6. Abscess of groin, left     7. Allergic rhinitis due to other allergic trigger, unspecified seasonality     8. Anemia, unspecified type     9. Anxiety and depression     10. Calculus, kidney     11. Gastroesophageal reflux disease without esophagitis     12. Hypothyroidism, unspecified type     13. Liver function study, abnormal     14. Mixed hyperlipidemia     15. LEXIS (obstructive sleep apnea)     16. Thyroid nodule     17. Type 2 diabetes mellitus with hyperglycemia, without long-term current use of insulin (HCC)     18. Vaginal candidiasis     19. Vitamin B12 deficiency     20. Vitamin D deficiency         Vaginal candida  Counseled. Resolved, was recurrent on Jardiance, resolved after stopping Jardiance. Type 2 diabetes mellitus with hyperglycemia, without long-term current use of insulin (HCC)  Tolerating medications., On metformin with precautions reviewed including lactic  acidosis diarrhea, recall reviewed, on Januvia as well precautions including pancreatic issues and thyroid cancer    On Glucotrol with precautions including hypoglycemia. Watch ambulatory. If out  of range, let us know. Stressed importance of daily foot examinations, regular eye  examinations etc. Micro-and macrovascular complications reviewed, hyper or  hypoglycemic precautions reviewed . cont lifestyle.      hemoglobin A1c goals reviewed. Did not tolerate Jardiance because of recurrent yeast infections. Continue per Dr. Andrei Levin.     Will consider ACE inhibitor but continues to decline at this time.     Liver function study, abnormal  Counseled extensively. Differential reviewed, including serious etiologies. h/o fatty  liver. Precautions reviewed. Lifestyle modification appropriate diet and weight loss  reviewed. Risks of even this leading to cirrhosis reviewed. Other than basic  monitoring on interested in other evaluation or treatment, in-depth blood work,  imaging or otherwise. . normalized, HEP B and C neg (she started hepatitis A and B series 2/25/2020, overdue on hepatitis B #2-will give today, hepatitis A #2 is due after October 25)    Watch closely with the initiation of statin last time.             Inflammatory polyarthropathy (HCC)  Chronic joint pain. Failed mtx. did not tolerated. gi intolerance. bw neg. recommend cont per dr dick/rheumatology but declines follow-up now. Relatively stable on Cymbalta although slightly increased recently. Consider Lyrica. Risk benefits reviewed. DIANELYS still elevated, 1-1 60-refer to Dr. Pasquale Buckner                   Abscess of groin, left  Counseled. At this point wishes to move forward with doxycycline with precautions including C. difficile, risk benefits of probiotic reviewed, Bactroban with precautions. Declines procedural intervention. Resolved. Declines to have me assessed. Thyroid nodule  Counseled extensively. Differential reviewed, including serious etiologies. History of  thyroid nodule resected with \"various cells\" but ultimately deemed to be benign. She  had a thyroid ultrasound 10/18 showing \"two benign cysts\" and 2 lymph nodes \"not  overtly suspicious.    Following with Dr. Marzena Smith and gets an ultrasound again in August.     Hypothyroidism  With history of partial left thyroidectomy. montior blood work. On Synthroid.    Follows with Dr. Gonzalo Reyes.   Thyroid ultrasound 12/15 showed thyroid goiter , 3mm nodule without  suspicious features and partial left lobectomy . , repeat 10/18 \.  TFTs stable.   She gerd    Vitamin B12 deficiency  History of. Appropriate supplementation reviewed. Risk of deficiency reviewed. Monitor. Acute non-recurrent sinusitis  Counseled. Symptoms resolved. Vertigo  Counseled extensively. Differential reviewed, including serious etiologies. Possibly secondary to sinusitis. Counseled labyrinthitis, BPPV as well as intracranial neoplasm, vertebrobasilar insufficiency, Ménière's etc.  Denies hearing loss. She gets good results with Antivert, may take as needed with precautions including sedation not to drive with this or vertigo. Overall she says significantly improved nearly resolved and declines other evaluation treatment unless it continues or worsens. Precautions reviewed      Allergic rhinitis  Counseled. Hold antihistamine during sinusitis, discussed nasal saline coolmist vaporizer avoidance, nasal steroid risk benefits, started last time may continue. She may consider switching Claritin to Allegra or equivalent as needed, consider Singulair  . No flowsheet data found. Plan as above. Counseled extensively and differential diagnoses relevant to above were reviewed, including serious etiologies. Side effects and interactions of medications were reviewed. Hepatitis B #2 today. Continue per multiple specialist.  Otherwise feels well. Counseled on Covid-19 precautions. She is going otherwise get labs as ordered for September 2 appointment and keep follow-up then sooner as needed. Precautions reviewed. Continue per multiple specialist    As long as symptoms steadily improve/resolve, and medical conditions follow the expected course, FU as below, sooner PRN. Return Keep September 2, sooner as needed. Signs and symptoms to watch for discussed, serious signs and symptoms reviewed. ER if any.         Sohan Tapia MD    Patients are advised to check with insurance company to ensure coverage and to fully understand benefits and cost prior to any

## 2020-07-28 NOTE — PATIENT INSTRUCTIONS
Patient Education        hepatitis B adult vaccine  Pronunciation:  HEP a ANN tis B a DULT VAX een  Brand:  Engerix-B, Heplisav-B, Recombivax HB Adult, Recombivax HB Dialysis Formulation  What is the most important information I should know about this vaccine? You should not receive hepatitis B vaccine if you are allergic to baker's yeast.  This vaccine will not protect against hepatitis B if you are already infected with the virus, even if you do not yet show symptoms. What is hepatitis B vaccine? Hepatitis is a serious disease caused by a virus. Hepatitis causes inflammation of the liver, vomiting, and jaundice (yellowing of the skin or eyes). Hepatitis can lead to liver cancer, cirrhosis, or death. Hepatitis B is a disease of the liver that is spread through blood or bodily fluids, sexual contact or sharing IV drug needles with an infected person, or during childbirth when the mother is infected. The hepatitis B adult vaccine is used to help prevent this disease in adults. This vaccine works by exposing you to a small amount of the virus, which causes the body to develop immunity to the disease. This vaccine will not treat an active infection that has already developed in the body. Vaccination with hepatitis B adult vaccine is recommended for all adults who are at risk of getting hepatitis B. Risk factors include: having more than one sex partner; being a homosexual male; having sexual contact with infected people; having chronic liver disease; having diabetes and being under age 61; having HIV or AIDS; using intravenous (IV) drugs; being on dialysis or receiving blood transfusions; working in an institution for developmentally disabled patients; working in healthcare or public safety and being exposed to human blood; being in [employer] or traveling to high-risk areas; and living with a person who has hepatitis B.   Like any vaccine, the hepatitis B vaccine may not provide protection from disease in every person. What should I discuss with my healthcare provider before receiving this vaccine? Hepatitis B vaccine will not protect against infection with hepatitis A, C, and E, or other viruses that affect the liver. It may also not protect against hepatitis B if you are already infected with the virus, even if you do not yet show symptoms. You should not receive this vaccine if you have ever had a life-threatening allergic reaction to any vaccine containing hepatitis B, or if you are allergic to baker's yeast.  If you have any of these other conditions, your vaccine may need to be postponed or not given at all:  · multiple sclerosis;  · kidney disease (or if you are on dialysis);  · a bleeding or blood clotting disorder such as hemophilia or easy bruising;  · an allergy to latex rubber; or  · a neurologic disorder or disease affecting the brain (or if this was a reaction to a previous vaccine). You can still receive a vaccine if you have a minor cold. In the case of a more severe illness with a fever or any type of infection, wait until you get better before receiving this vaccine. It is not known whether this vaccine will harm an unborn baby. However, if you are at a high risk for infection with hepatitis B during pregnancy, your doctor should determine whether you need this vaccine. It may not be safe to breast-feed while receiving this medicine. Ask your doctor about any risk. How is this vaccine given? This vaccine is given as an injection (shot) into a muscle. A healthcare provider will give you this injection. The hepatitis B vaccine is given in a series of shots. The booster shots are sometimes given 1 month and 6 months after the first shot. If you have a high risk of hepatitis B infection, you may be given an additional booster 1 to 2 months after the third shot. Your individual booster schedule may be different from these guidelines.  Follow your doctor's instructions or the schedule recommended by the health department of the state you live in. What happens if I miss a dose? Contact your doctor if you will miss a booster dose or if you get behind schedule. The next dose should be given as soon as possible. There is no need to start over. Be sure to receive all recommended doses of this vaccine or you may not be fully protected against disease. What happens if I overdose? An overdose of this vaccine is unlikely to occur. What should I avoid before or after receiving this vaccine? Follow your doctor's instructions about any restrictions on food, beverages, or activity. What are the possible side effects of this vaccine? Get emergency medical help if you have signs of an allergic reaction (hives, difficult breathing, swelling in your face or throat) or a severe skin reaction (fever, sore throat, burning eyes, skin pain, red or purple skin rash with blistering and peeling). You should not receive a booster vaccine if you had a life-threatening allergic reaction after the first shot. Keep track of any and all side effects you have after receiving this vaccine. When you receive a booster dose, you will need to tell the doctor if the previous shot caused any side effects. Becoming infected with hepatitis B is much more dangerous to your health than receiving this vaccine. However, like any medicine, this vaccine can cause side effects but the risk of serious side effects is extremely low. Call your doctor at once if you have:  · high fever, sore throat, and headache with a severe blistering, peeling, and red skin rash;  · changes in behavior;  · bruising or bleeding; or  · fever, swollen glands, itching, joint pain, or not feeling well. Common side effects include:  · redness, pain, swelling, or a lump where the shot was given;  · diarrhea, loss of appetite;  · headache, dizziness, weakness;  · low fever;  · feeling tired or irritable; or  · runny nose.   This is not a complete list of side effects and others may occur. Call your doctor for medical advice about side effects. You may report vaccine side effects to the [address] [address] and [facility] at [phone]. What other drugs will affect hepatitis B vaccine? Other drugs may affect this vaccine, including prescription and over-the-counter medicines, vitamins, and herbal products. Tell your doctor about all your current medicines and any medicine you start or stop using. Where can I get more information? Your doctor or pharmacist can provide more information about this vaccine. Additional information is available from your local health department or the Centers for Disease Control and Prevention. Remember, keep this and all other medicines out of the reach of children, never share your medicines with others, and use this medication only for the indication prescribed. Every effort has been made to ensure that the information provided by [address] is accurate, up-to-date, and complete, but no guarantee is made to that effect. Drug information contained herein may be time sensitive. [facility] information has been compiled for use by healthcare practitioners and consumers in the [facility] and therefore The [facility] does not warrant that uses outside of the [facility] are appropriate, unless specifically indicated otherwise. [facility]'s drug information does not endorse drugs, diagnose patients or recommend therapy. [facility] drug information is an informational resource designed to assist licensed healthcare practitioners in caring for their patients and/or to serve consumers viewing this service as a supplement to, and not a substitute for, the expertise, skill, knowledge and judgment of healthcare practitioners. The absence of a warning for a given drug or drug combination in no way should be construed to indicate that the drug or drug combination is safe, effective or appropriate for any given patient.  [facility] does not assume any responsibility for any aspect of healthcare administered with the aid of information [facility] provides. The information contained herein is not intended to cover all possible uses, directions, precautions, warnings, drug interactions, allergic reactions, or adverse effects. If you have questions about the drugs you are taking, check with your doctor, nurse or pharmacist.  Copyright 6397-0123 [address]. Version: 6.01. Revision date: 5/7/2018. Care instructions adapted under license by [facility] ([facility]). If you have questions about a medical condition or this instruction, always ask your healthcare professional. [place] [address] any warranty or liability for your use of this information.

## 2020-07-30 ENCOUNTER — HOSPITAL ENCOUNTER (OUTPATIENT)
Dept: ULTRASOUND IMAGING | Age: 45
Discharge: HOME | End: 2020-08-01
Payer: COMMERCIAL

## 2020-07-30 ENCOUNTER — HOSPITAL ENCOUNTER (OUTPATIENT)
Age: 45
Discharge: HOME | End: 2020-08-01
Payer: COMMERCIAL

## 2020-07-30 PROCEDURE — 76536 US EXAM OF HEAD AND NECK: CPT

## 2020-08-07 ENCOUNTER — TELEPHONE (OUTPATIENT)
Dept: ENDOCRINOLOGY | Age: 45
End: 2020-08-07

## 2020-08-07 NOTE — TELEPHONE ENCOUNTER
Ton called for US thyroid results. Per result note under us report, dr Graeme Davidson saw report and will discuss results with pt at office visit in [address] [place] [address].     Ton notified

## 2020-08-27 PROBLEM — Z00.00 HEALTH MAINTENANCE EXAMINATION: Status: RESOLVED | Noted: 2019-07-25 | Resolved: 2020-08-27

## 2020-09-01 ENCOUNTER — OFFICE VISIT (OUTPATIENT)
Dept: ENDOCRINOLOGY | Age: 45
End: 2020-09-01
Payer: COMMERCIAL

## 2020-09-01 VITALS
RESPIRATION RATE: 16 BRPM | WEIGHT: 254.6 LBS | HEART RATE: 105 BPM | SYSTOLIC BLOOD PRESSURE: 136 MMHG | DIASTOLIC BLOOD PRESSURE: 78 MMHG | BODY MASS INDEX: 36.53 KG/M2 | TEMPERATURE: 96.4 F | OXYGEN SATURATION: 98 %

## 2020-09-01 LAB — HBA1C MFR BLD: 9.3 %

## 2020-09-01 PROCEDURE — 83036 HEMOGLOBIN GLYCOSYLATED A1C: CPT | Performed by: INTERNAL MEDICINE

## 2020-09-01 PROCEDURE — 99214 OFFICE O/P EST MOD 30 MIN: CPT | Performed by: INTERNAL MEDICINE

## 2020-09-01 RX ORDER — LEVOTHYROXINE SODIUM 0.07 MG/1
75 TABLET ORAL DAILY
Qty: 90 TABLET | Refills: 3 | Status: SHIPPED
Start: 2020-09-01 | End: 2021-04-26 | Stop reason: SDUPTHER

## 2020-09-01 NOTE — PROGRESS NOTES
[address] [place] [address] Department of Endocrinology Diabetes and Metabolism   [address] [facility] [address]   Phone: [phone]  Fax: [phone]    Date of Service: 9/1/2020    Primary Care Physician: Albina Smith MD  Provider: Beena Joseph MD     Reason for the visit:  DM type 2, Hypothyroidism, MNG     History of Present Illness: The history is provided by the patient. No  was used. Accuracy of the patient data is excellent. Kingsley Dejesus is a very pleasant 38 y.o. female seen today for diabetes management     Kingsley Dejesus was diagnosed with diabetes at age 46 and currently on Metformin 1000 mg BID, Januvia 100 mg daily, Glipizide ER 10 mg daily   Wasn't able to tolerate Jardiance because of yeast infection   The patient has been checking blood sugar 2-3 times/wk. Readings usually around 220  Most recent A1c results summarized below  Lab Results   Component Value Date    LABA1C 9.3 09/01/2020    LABA1C 8.9 05/28/2020    LABA1C 9.7 02/25/2020     Patient has had no hypoglycemic episodes   The patient hasn't been mindful of what has been eating and wasn't following diabetes diet as encouraged   I reviewed current medications and the patient has no issues with diabetes medications  Kingsley Dejesus is up to date with eye exam and denied any history of diabetic retinopathy   The patient performs  own feet care  Microvascular complications:  No Retinopathy, Nephropathy or Neuropathy   Macrovascular complications: no CAD, PVD, or Stroke  The patient refuses Flushot and pneumonia vaccine     Hypothyroidism   H/o partial thyroidectomy long time ago for MNG. Per pt pathology was benign   She is currently on Levothyhroxine 75 mcg dailty. Patient takes levothyroxine in the morning at empty stomach, wait one hour before eating , avoid multivitamins containing calcium  or iron with it.   Lab Results   Component Value Date/Time    TSH 2.450 05/28/2020 08:30 AM    T4FREE 1.06 2020 08:30 AM       Thyroid nodule   Thyroid US 10/2018: Left lobe surgically absent  Rt lobe measures 5.4 cm --> 3 mm cyst     Elbert Olivares denies any new lumps, bumps in the neck, voice change, or shortness of breath. No family history of thyroid cancer. No prior history of radiation to head or neck region. PAST MEDICAL HISTORY   Past Medical History:   Diagnosis Date    Depression     Diabetes mellitus ([address]     GERD (gastroesophageal reflux disease)     Joint pain     Thyroid disease        PAST SURGICAL HISTORY   Past Surgical History:   Procedure Laterality Date     SECTION      CHOLECYSTECTOMY      HYSTERECTOMY         SOCIAL HISTORY   Tobacco:   reports that she has never smoked. She has never used smokeless tobacco.  Alcohol:   reports no history of alcohol use. Drugs:   reports no history of drug use. FAMILY HISTORY   No family history on file.     ALLERGIES AND DRUG REACTIONS   Allergies   Allergen Reactions    Celecoxib Hives       CURRENT MEDICATIONS   Current Outpatient Medications   Medication Sig Dispense Refill    levothyroxine (SYNTHROID) 75 MCG tablet Take 1 tablet by mouth Daily 90 tablet 3    rosuvastatin (CRESTOR) 10 MG tablet Take 1 tablet by mouth daily 30 tablet 3    Blood Glucose Monitoring Suppl MISC Blood glucose meter - use as directed 1 each 1    blood glucose monitor strips Use to check blood sugar 3 times/day 100 strip 5    Lancets MISC Use to test blood sugar 3 times a day 100 each 5    aspirin 81 MG EC tablet Take 81 mg by mouth daily      B Complex-C (SUPER B COMPLEX/VITAMIN C PO) Take by mouth daily      loratadine (CLARITIN) 10 MG tablet Take 10 mg by mouth daily      glipiZIDE (GLUCOTROL XL) 10 MG extended release tablet Take 1 tablet by mouth daily 30 tablet 5    ONE TOUCH ULTRASOFT LANCETS MISC Test 2 times/day 100 each 3    metFORMIN (GLUCOPHAGE-XR) 500 MG extended release tablet Take 2 tablets by mouth 2 times daily 360 tablet 3    uncontrol   · Will change DM regimen to Metformin 1000 mg BID,Glipizide Er 10 mg daily   · Stop Januvia and start Ozempic 0.25 mg/wk   · The patient was advised to check blood sugars 2-3  times a day before meals and at bedtime and send BS readings to our office in a week. · Discussed with patient A1c and blood sugar goals   · Patient will need routine diabetes maintenance and prevention  · The patient was referred to diabetic educator for further teaching   · Diabetes labs before next visit     Dietary noncompliance   Discussed with patient the importance of eating consistent carbohydrate meals, avoiding high glycemic index food. Also, discussed with patient the risk and negative consequences of dietary noncompliance on blood glucose control, blood pressure and weight    Hypothyroidism   · Continue levothyroxine 75 mcg daily    MNG   · S/p partial Left lobectomy long time ago  · US 7/2020 - small Rt side nodule measuring 9 mm, no suspicious features   · Repeat US in a year     Return in about 4 months (around 1/1/2021) for DM type 2 . The above issues were reviewed with the patient who understood and agreed with the plan. Thank you for allowing us to participate in the care of this patient. Please do not hesitate to contact us with any additional questions. Diagnosis Orders   1. Type 2 diabetes mellitus without complication, without long-term current use of insulin (HCC)  POCT glycosylated hemoglobin (Hb A1C)    Basic Metabolic Panel    Hemoglobin A1C    Microalbumin / Creatinine Urine Ratio   2. Multinodular goiter     3. Vitamin D deficiency  Vitamin D 25 Hydroxy   4. Hypothyroidism, unspecified type  TSH without Reflex    T4, Free    levothyroxine (SYNTHROID) 75 MCG tablet   5.  Thyroid nodule  levothyroxine (SYNTHROID) 75 MCG tablet     Carito Greene MD  Endocrinologist, [facility] Diabetes Care and Endocrinology   [address] [facility] [address]   Phone: [phone]  Fax: [phone]  --------------------------------------------  An electronic signature was used to authenticate this note.  Cornelio Chaidez MD on 9/1/2020 at 7:19 PM

## 2020-09-17 ENCOUNTER — VIRTUAL VISIT (OUTPATIENT)
Dept: PRIMARY CARE CLINIC | Age: 45
End: 2020-09-17
Payer: COMMERCIAL

## 2020-09-17 PROCEDURE — 99214 OFFICE O/P EST MOD 30 MIN: CPT | Performed by: FAMILY MEDICINE

## 2020-09-17 RX ORDER — BUPROPION HYDROCHLORIDE 150 MG/1
150 TABLET, EXTENDED RELEASE ORAL EVERY MORNING
Qty: 30 TABLET | Refills: 1 | Status: SHIPPED
Start: 2020-09-17 | End: 2020-10-08 | Stop reason: SDUPTHER

## 2020-09-17 NOTE — PROGRESS NOTES
pandemic. Adamantly denies SI/HI. She is seen rheumatology in October. Saw endocrinology recently. Sugars are doing better she states. No other complaints or concerns at this time. Past labs reviewed.   Did not get current labs that are due for September 2020    Most Recent Labs  CBC  Lab Results   Component Value Date    WBC 8.8 05/28/2020    WBC 9.6 10/17/2019    WBC 9.2 10/17/2019    RBC 5.30 05/28/2020    RBC 5.44 10/17/2019    RBC 5.80 10/17/2019    HGB 13.3 05/28/2020    HGB 13.8 10/17/2019    HGB 14.3 10/17/2019    HCT 41.6 05/28/2020    HCT 43.5 10/17/2019    HCT 47.2 10/17/2019    MCV 78.5 05/28/2020    MCV 80.0 10/17/2019    MCV 81.4 10/17/2019     05/28/2020     10/17/2019     10/17/2019      CMP  Lab Results   Component Value Date     07/17/2020     05/28/2020     02/22/2020    K 3.8 07/17/2020    K 3.9 05/28/2020    K 4.1 02/22/2020    K 4.2 10/17/2019     07/17/2020     05/28/2020     02/22/2020    CO2 24 07/17/2020    CO2 24 05/28/2020    CO2 25 02/22/2020    ANIONGAP 12 07/17/2020    ANIONGAP 11 05/28/2020    ANIONGAP 10 10/17/2019    GLUCOSE 219 07/17/2020    GLUCOSE 317 05/28/2020    GLUCOSE 253 02/22/2020    BUN 11 07/17/2020    BUN 9 05/28/2020    BUN 12 02/22/2020    CREATININE 0.5 07/17/2020    CREATININE 0.5 05/28/2020    CREATININE 0.66 02/22/2020    LABGLOM >60 07/17/2020    LABGLOM >60 05/28/2020    LABGLOM 108 02/22/2020    LABGLOM >60 10/17/2019    GFRAA >60 07/17/2020    GFRAA >60 05/28/2020    GFRAA >60 10/17/2019    CALCIUM 8.8 07/17/2020    CALCIUM 9.0 05/28/2020    CALCIUM 8.9 02/22/2020    PROT 7.2 07/17/2020    PROT 7.5 05/28/2020    PROT 7.6 10/17/2019    LABALBU 4.0 07/17/2020    LABALBU 4.0 05/28/2020    LABALBU 4.2 02/22/2020    BILITOT 1.1 07/17/2020    BILITOT 0.5 05/28/2020    BILITOT 0.9 02/22/2020    ALKPHOS 82 07/17/2020    ALKPHOS 86 05/28/2020    ALKPHOS 91 02/22/2020    AST 41 07/17/2020    AST 34 05/28/2020 AST 38 02/22/2020    ALT 51 07/17/2020    ALT 51 05/28/2020    ALT 49 02/22/2020     A1C  Lab Results   Component Value Date    LABA1C 9.3 09/01/2020    LABA1C 8.9 05/28/2020    LABA1C 9.7 02/25/2020     TSH  Lab Results   Component Value Date    TSH 2.450 05/28/2020    TSH 0.72 02/22/2020    TSH 1.290 10/17/2019     FREET4  No results found for: B0OPLHW  LIPID  Lab Results   Component Value Date    CHOL 181 07/17/2020    CHOL 223 05/28/2020    CHOL 240 02/22/2020    HDL 58 07/17/2020    HDL 55 05/28/2020    HDL 61 02/22/2020    LDLCALC 93 07/17/2020    LDLCALC - 05/28/2020    LDLCALC 152 02/22/2020    TRIG 150 07/17/2020    TRIG 418 05/28/2020    TRIG 142 02/22/2020    CHOLHDLRATIO 3.9 02/22/2020     VITAMIN D  No results found for: VITD25  MAGNESIUM  No results found for: MG   PHOS  No results found for: PHOS   DIANELYS   Lab Results   Component Value Date    DIANELYS POSITIVE 07/17/2020     RHEUMATOID FACTOR  Lab Results   Component Value Date    RF <10 07/17/2020     PSA  No results found for: PSA   HEPATITIS C  No results found for: HCVABI  HIV  No results found for: USK5PUQ, HIV1QT  UA  Lab Results   Component Value Date    COLORU Yellow 02/22/2020    COLORU Yellow 11/13/2019    COLORU Yellow 10/17/2019    COLORU Yellow 07/26/2019    CLARITYU Turbid 02/22/2020    CLARITYU Cloudy 11/13/2019    CLARITYU TURBID 10/17/2019    CLARITYU CLOUDY 07/26/2019    GLUCOSEU 3+ 02/22/2020    GLUCOSEU 500 11/13/2019    GLUCOSEU >=1000 10/17/2019    GLUCOSEU 500 07/26/2019    BILIRUBINUR Negative 02/22/2020    BILIRUBINUR Negative 11/13/2019    BILIRUBINUR Negative 10/17/2019    BILIRUBINUR Negative 07/26/2019    BILIRUBINUR Negative 01/10/2016    KETUA  02/22/2020      Comment:      trace    KETUA Negative 11/13/2019    KETUA TRACE 10/17/2019    KETUA Negative 07/26/2019    SPECGRAV 1.015 11/13/2019    SPECGRAV >=1.030 10/17/2019    SPECGRAV >=1.030 07/26/2019    SPECGRAV >=1.030 01/10/2016    BLOODU Negative 02/22/2020    BLOODU History Narrative    PMH:    Problem List: Eruption, Infestation by Sarcoptes scabiei sherly hominis, Dysthymic disorder, Peptic reflux    disease, Dysthymia, Hypothyroidism, Adult health examination        Health Maintenance:    Mammogram - (10/1/2018)    Mammogram Screening - (10/1/2018)    Influenza Vaccination - (9/1/2015)        Medical Problems:    vitamin D def, Asthma    Hypothyroidism - partial thyroidectomy (half of left lobe); benign nodule    Anxiety    GERD - EGD 2011    Seasonal Allergies, Type 2 Diabetes        Surgical Hx:    C/S    Partial Hysterectomy - left ovary remains (bleeding issues)    Benita        FH:    Father:    . (Hx)    Mother:    . (Hx)    Mom - thyroid cancer, melanoma    Dad - depression    cousin - melanoma        SH:    . (Marital)Stay at home;    3 kids (8,7,5 as of 2014). No complications during pregnancy. Denise Andujar ; works at [employer]    Personal Habits: Cigarette Use: Nonsmoker. Alcohol: Denies alcohol use. PHYSICAL EXAMINATION:  [ INSTRUCTIONS:  \"[x]\" Indicates a positive item  \"[]\" Indicates a negative item  -- DELETE ALL ITEMS NOT EXAMINED]  Vital Signs: (As obtained by patient/caregiver or practitioner observation)    There were no vitals filed for this visit. Blood pressure-  Heart rate-    Respiratory rate-    Temperature-  Pulse oximetry-     Constitutional: [x] Appears well-developed and well-nourished [x] No apparent distress      [] Abnormal-   Mental status  [x] Alert and awake  [x] Oriented to person/place/time [x]Able to follow commands      Eyes:  EOM    [x]  Normal  [] Abnormal-  Sclera  [x]  Normal  [] Abnormal -         Discharge [x]  None visible  [] Abnormal -    HENT:   [x] Normocephalic, atraumatic.   [] Abnormal   [x] Mouth/Throat: Mucous membranes are moist.     External Ears [x] Normal  [] Abnormal-     Neck: [x] No visualized mass     Pulmonary/Chest: [x] Respiratory effort normal.  [x] No visualized signs of difficulty breathing or respiratory distress        [] Abnormal-      Musculoskeletal:   [x] Normal gait with no signs of ataxia         [x] Normal range of motion of neck        [] Abnormal-       Neurological:        [x] No Facial Asymmetry (Cranial nerve 7 motor function) (limited exam to video visit)          [x] No gaze palsy        [] Abnormal-         Skin:        [x] No significant exanthematous lesions or discoloration noted on facial skin         [] Abnormal-            Psychiatric:       [x] Normal Affect [x] No Hallucinations        [] Abnormal-     Other pertinent observable physical exam findings-     ASSESSMENT/PLAN:   Diagnosis Orders   1. Anxiety and depression  External Referral To Psychiatry    buPROPion (WELLBUTRIN SR) 150 MG extended release tablet   2. Type 2 diabetes mellitus with hyperglycemia, without long-term current use of insulin ([facility])     3. Liver function study, abnormal     4. Thyroid nodule     5. Hypothyroidism, unspecified type     6. Vitamin D deficiency     7. Inflammatory polyarthropathy ([address]     8. LEXIS (obstructive sleep apnea)     9. Mixed hyperlipidemia     10. Vitamin B12 deficiency         No problem-specific Assessment & Plan notes found for this encounter. Anxiety and depression  Was doing well sertraline but because of the chronic pain we changed to Cymbalta   which is helping better significantly with pain, was doing well psychologically but now getting breakthrough depression. She does not wish to switch to Cymbalta because it is helping with the pain. We did discuss going back to SSRI but she does not wish to at this point. We discussed switching to Pristiq with more dosing options. However she has done very well with Wellbutrin the past and she would like to add Wellbutrin  in the morning. If underlying anxiety we discussed BuSpar but it is more of a depression at this time. Refer to [email or web address] counseling for evaluation and treatment as well.   Adamantly denies SI/HI          Type 2 diabetes mellitus with hyperglycemia, without long-term current use of insulin (HCC)  Tolerating medications. ,  Doing much better she states on On Trulicity with precautions. also on metformin with precautions reviewed including lactic  acidosis diarrhea, recall reviewed.  On Glucotrol with precautions including hypoglycemia. Watch ambulatory. If out  of range, let us know. Stressed importance of daily foot examinations, regular eye  examinations etc. Micro-and macrovascular complications reviewed, hyper or  hypoglycemic precautions reviewed . cont lifestyle.       hemoglobin A1c goals reviewed.     Did not tolerate Jardiance because of recurrent yeast infections. Continue per Dr. Arun Muñoz.     Will consider ACE inhibitor but continues to decline at this time.     Liver function study, abnormal  Counseled extensively. Differential reviewed, including serious etiologies. h/o fatty  liver. Precautions reviewed. Lifestyle modification appropriate diet and weight loss  reviewed. Risks of even this leading to cirrhosis reviewed. Other than basic  monitoring on interested in other evaluation or treatment, in-depth blood work,  imaging or otherwise. . normalized, HEP B and C neg (she started hepatitis A and B series 2/25/2020, will need hepatitis A #2 and hepatitis B #3Watch closely with the initiation of statin last time.              Inflammatory polyarthropathy (HCC)  Chronic joint pain. Failed mtx. did not tolerated. gi intolerance. bw neg. recommend cont per dr dick/rheumatology but declines follow-up now. Relatively stable on Cymbalta although slightly increased recently. Consider Lyrica. Risk benefits reviewed. DIANELYS still elevated, 1-1 60-, has appointment with Dr. Rosalba Hammond in October.                          Thyroid nodule  Counseled extensively. Differential reviewed, including serious etiologies.  History of  thyroid nodule resected with \"various cells\" but ultimately deemed to be discussion virtually to substitute for in-person clinic visit. Various options to be seen in person were discussed. Patients are advised to check with insurance company to ensure coverage and to fully understand benefits and cost prior to any testing to try to avoid unexpected charges. This note was created with the assistance of voice recognition software. Inadvertent errors may be present. Signs and symptoms to watch for were discussed. Serious signs and symptoms reviewed. ER if any    --Rylee Grullon MD on 9/17/2020 at 5:10 PM    An electronic signature was used to authenticate this note.

## 2020-09-29 ENCOUNTER — TELEPHONE (OUTPATIENT)
Dept: ENDOCRINOLOGY | Age: 45
End: 2020-09-29

## 2020-09-30 RX ORDER — SEMAGLUTIDE 1.34 MG/ML
0.5 INJECTION, SOLUTION SUBCUTANEOUS
Qty: 1 PEN | Refills: 5 | Status: SHIPPED
Start: 2020-09-30 | End: 2021-01-22

## 2020-09-30 RX ORDER — GLIPIZIDE 10 MG/1
10 TABLET, FILM COATED, EXTENDED RELEASE ORAL 2 TIMES DAILY
Qty: 60 TABLET | Refills: 5 | Status: SHIPPED
Start: 2020-09-30 | End: 2021-01-19

## 2020-10-02 ENCOUNTER — APPOINTMENT (OUTPATIENT)
Dept: CT IMAGING | Age: 45
End: 2020-10-02
Payer: COMMERCIAL

## 2020-10-02 ENCOUNTER — HOSPITAL ENCOUNTER (EMERGENCY)
Age: 45
Discharge: HOME | End: 2020-10-03
Attending: EMERGENCY MEDICINE
Payer: COMMERCIAL

## 2020-10-02 LAB
ALBUMIN SERPL-MCNC: 4 G/DL (ref 3.5–5.2)
ALP BLD-CCNC: 105 U/L (ref 35–104)
ALT SERPL-CCNC: 64 U/L (ref 0–32)
ANION GAP SERPL CALCULATED.3IONS-SCNC: 9 MMOL/L (ref 7–16)
AST SERPL-CCNC: 46 U/L (ref 0–31)
BACTERIA: ABNORMAL /HPF
BASOPHILS ABSOLUTE: 0.03 E9/L (ref 0–0.2)
BASOPHILS RELATIVE PERCENT: 0.2 % (ref 0–2)
BILIRUB SERPL-MCNC: 0.7 MG/DL (ref 0–1.2)
BILIRUBIN URINE: NEGATIVE
BLOOD, URINE: NEGATIVE
BUN BLDV-MCNC: 7 MG/DL (ref 6–20)
CALCIUM SERPL-MCNC: 9.6 MG/DL (ref 8.6–10.2)
CHLORIDE BLD-SCNC: 103 MMOL/L (ref 98–107)
CLARITY: ABNORMAL
CO2: 24 MMOL/L (ref 22–29)
COLOR: ABNORMAL
CREAT SERPL-MCNC: 0.7 MG/DL (ref 0.5–1)
EOSINOPHILS ABSOLUTE: 0.09 E9/L (ref 0.05–0.5)
EOSINOPHILS RELATIVE PERCENT: 0.7 % (ref 0–6)
EPITHELIAL CELLS, UA: ABNORMAL /HPF
GFR AFRICAN AMERICAN: >60
GFR NON-AFRICAN AMERICAN: >60 ML/MIN/1.73
GLUCOSE BLD-MCNC: 105 MG/DL (ref 74–99)
GLUCOSE URINE: NEGATIVE MG/DL
HCT VFR BLD CALC: 43.1 % (ref 34–48)
HEMOGLOBIN: 13.8 G/DL (ref 11.5–15.5)
IMMATURE GRANULOCYTES #: 0.03 E9/L
IMMATURE GRANULOCYTES %: 0.2 % (ref 0–5)
KETONES, URINE: ABNORMAL MG/DL
LACTIC ACID: 1.5 MMOL/L (ref 0.5–2.2)
LEUKOCYTE ESTERASE, URINE: NEGATIVE
LYMPHOCYTES ABSOLUTE: 4.2 E9/L (ref 1.5–4)
LYMPHOCYTES RELATIVE PERCENT: 32.8 % (ref 20–42)
MAGNESIUM: 1.7 MG/DL (ref 1.6–2.6)
MCH RBC QN AUTO: 25.1 PG (ref 26–35)
MCHC RBC AUTO-ENTMCNC: 32 % (ref 32–34.5)
MCV RBC AUTO: 78.5 FL (ref 80–99.9)
MONOCYTES ABSOLUTE: 0.77 E9/L (ref 0.1–0.95)
MONOCYTES RELATIVE PERCENT: 6 % (ref 2–12)
NEUTROPHILS ABSOLUTE: 7.67 E9/L (ref 1.8–7.3)
NEUTROPHILS RELATIVE PERCENT: 60.1 % (ref 43–80)
NITRITE, URINE: NEGATIVE
PDW BLD-RTO: 15.9 FL (ref 11.5–15)
PH UA: 6.5 (ref 5–9)
PLATELET # BLD: 349 E9/L (ref 130–450)
PMV BLD AUTO: 9.1 FL (ref 7–12)
POTASSIUM REFLEX MAGNESIUM: 3.3 MMOL/L (ref 3.5–5)
PROTEIN UA: ABNORMAL MG/DL
RBC # BLD: 5.49 E12/L (ref 3.5–5.5)
RBC UA: ABNORMAL /HPF (ref 0–2)
SODIUM BLD-SCNC: 136 MMOL/L (ref 132–146)
SPECIFIC GRAVITY UA: >=1.03 (ref 1–1.03)
TOTAL PROTEIN: 7.6 G/DL (ref 6.4–8.3)
UROBILINOGEN, URINE: 0.2 E.U./DL
WBC # BLD: 12.8 E9/L (ref 4.5–11.5)
WBC UA: ABNORMAL /HPF (ref 0–5)

## 2020-10-02 PROCEDURE — 99285 EMERGENCY DEPT VISIT HI MDM: CPT

## 2020-10-02 PROCEDURE — 85025 COMPLETE CBC W/AUTO DIFF WBC: CPT

## 2020-10-02 PROCEDURE — 99284 EMERGENCY DEPT VISIT MOD MDM: CPT

## 2020-10-02 PROCEDURE — 96365 THER/PROPH/DIAG IV INF INIT: CPT

## 2020-10-02 PROCEDURE — 36415 COLL VENOUS BLD VENIPUNCTURE: CPT

## 2020-10-02 PROCEDURE — 83735 ASSAY OF MAGNESIUM: CPT

## 2020-10-02 PROCEDURE — 6360000004 HC RX CONTRAST MEDICATION: Performed by: RADIOLOGY

## 2020-10-02 PROCEDURE — 2580000003 HC RX 258: Performed by: RADIOLOGY

## 2020-10-02 PROCEDURE — 96375 TX/PRO/DX INJ NEW DRUG ADDON: CPT

## 2020-10-02 PROCEDURE — 81001 URINALYSIS AUTO W/SCOPE: CPT

## 2020-10-02 PROCEDURE — 74177 CT ABD & PELVIS W/CONTRAST: CPT

## 2020-10-02 PROCEDURE — 96366 THER/PROPH/DIAG IV INF ADDON: CPT

## 2020-10-02 PROCEDURE — 96361 HYDRATE IV INFUSION ADD-ON: CPT

## 2020-10-02 PROCEDURE — 80053 COMPREHEN METABOLIC PANEL: CPT

## 2020-10-02 PROCEDURE — 83605 ASSAY OF LACTIC ACID: CPT

## 2020-10-02 RX ORDER — SODIUM CHLORIDE 0.9 % (FLUSH) 0.9 %
10 SYRINGE (ML) INJECTION 2 TIMES DAILY
Status: DISCONTINUED | OUTPATIENT
Start: 2020-10-02 | End: 2020-10-03 | Stop reason: HOSPADM

## 2020-10-02 RX ADMIN — IOPAMIDOL 75 ML: 755 INJECTION, SOLUTION INTRAVENOUS at 23:44

## 2020-10-02 RX ADMIN — Medication 10 ML: at 23:39

## 2020-10-03 VITALS
OXYGEN SATURATION: 97 % | RESPIRATION RATE: 14 BRPM | HEIGHT: 70 IN | TEMPERATURE: 97.7 F | SYSTOLIC BLOOD PRESSURE: 115 MMHG | BODY MASS INDEX: 35.79 KG/M2 | HEART RATE: 80 BPM | DIASTOLIC BLOOD PRESSURE: 71 MMHG | WEIGHT: 250 LBS

## 2020-10-03 PROCEDURE — 6370000000 HC RX 637 (ALT 250 FOR IP): Performed by: STUDENT IN AN ORGANIZED HEALTH CARE EDUCATION/TRAINING PROGRAM

## 2020-10-03 PROCEDURE — 6360000002 HC RX W HCPCS: Performed by: STUDENT IN AN ORGANIZED HEALTH CARE EDUCATION/TRAINING PROGRAM

## 2020-10-03 RX ORDER — ONDANSETRON 4 MG/1
4 TABLET, ORALLY DISINTEGRATING ORAL 3 TIMES DAILY PRN
Qty: 21 TABLET | Refills: 0 | Status: SHIPPED | OUTPATIENT
Start: 2020-10-03 | End: 2020-11-30 | Stop reason: CLARIF

## 2020-10-03 RX ORDER — ONDANSETRON 2 MG/ML
4 INJECTION INTRAMUSCULAR; INTRAVENOUS ONCE
Status: COMPLETED | OUTPATIENT
Start: 2020-10-03 | End: 2020-10-03

## 2020-10-03 RX ORDER — POTASSIUM CHLORIDE 7.45 MG/ML
10 INJECTION INTRAVENOUS ONCE
Status: COMPLETED | OUTPATIENT
Start: 2020-10-03 | End: 2020-10-03

## 2020-10-03 RX ORDER — MAGNESIUM SULFATE IN WATER 40 MG/ML
2 INJECTION, SOLUTION INTRAVENOUS ONCE
Status: COMPLETED | OUTPATIENT
Start: 2020-10-03 | End: 2020-10-03

## 2020-10-03 RX ORDER — ACETAMINOPHEN 325 MG/1
650 TABLET ORAL ONCE
Status: COMPLETED | OUTPATIENT
Start: 2020-10-03 | End: 2020-10-03

## 2020-10-03 RX ADMIN — POTASSIUM CHLORIDE 10 MEQ: 7.46 INJECTION, SOLUTION INTRAVENOUS at 00:27

## 2020-10-03 RX ADMIN — MAGNESIUM SULFATE 2 G: 2 INJECTION INTRAVENOUS at 01:38

## 2020-10-03 RX ADMIN — ACETAMINOPHEN 650 MG: 325 TABLET ORAL at 01:11

## 2020-10-03 RX ADMIN — ONDANSETRON 4 MG: 2 INJECTION INTRAMUSCULAR; INTRAVENOUS at 00:27

## 2020-10-03 RX ADMIN — POTASSIUM BICARBONATE 40 MEQ: 782 TABLET, EFFERVESCENT ORAL at 00:26

## 2020-10-03 ASSESSMENT — ENCOUNTER SYMPTOMS
ABDOMINAL DISTENTION: 0
BACK PAIN: 0
COUGH: 0
EYE DISCHARGE: 0
SHORTNESS OF BREATH: 0
ABDOMINAL PAIN: 1
VOMITING: 1
SORE THROAT: 0
NAUSEA: 1
EYE REDNESS: 0
WHEEZING: 0
DIARRHEA: 1
SINUS PRESSURE: 0
EYE PAIN: 0

## 2020-10-03 ASSESSMENT — PAIN SCALES - GENERAL: PAINLEVEL_OUTOF10: 8

## 2020-10-03 NOTE — ED PROVIDER NOTES
Chief Complaint   Patient presents with    Diarrhea     x 2 days    Emesis     x 2 days       Patient is a 71-year-old female presents today for nausea, vomiting, and generalized abdominal pain over the past 2 days. She states she has had multiple episodes of vomiting, as well as nonbloody diarrhea. She does endorse mild right-sided abdominal pain. She is not take any medication for the symptoms. Symptoms have continued to progress. Patient denies recent sick contacts. She states she is trying to eat food at home, is not able to keep anything down. She denies fevers or chills. She denies chest pain or shortness of breath. She has not had symptoms like this before in the past.  She denies urinary symptoms. The history is provided by the patient. No  was used. Review of Systems   Constitutional: Negative for chills and fever. HENT: Negative for ear pain, sinus pressure and sore throat. Eyes: Negative for pain, discharge and redness. Respiratory: Negative for cough, shortness of breath and wheezing. Cardiovascular: Negative for chest pain. Gastrointestinal: Positive for abdominal pain, diarrhea, nausea and vomiting. Negative for abdominal distention. Genitourinary: Negative for dysuria and frequency. Musculoskeletal: Negative for arthralgias and back pain. Skin: Negative for rash and wound. Neurological: Negative for dizziness, weakness and headaches. Hematological: Negative for adenopathy. Psychiatric/Behavioral: Negative for behavioral problems and confusion. All other systems reviewed and are negative. Physical Exam  Vitals signs and nursing note reviewed. Constitutional:       General: She is not in acute distress. Appearance: Normal appearance. She is well-developed. She is not ill-appearing. HENT:      Head: Normocephalic and atraumatic.       Mouth/Throat:      Mouth: Mucous membranes are moist.   Eyes:      Pupils: Pupils are equal, round, and reactive to light. Neck:      Musculoskeletal: Normal range of motion and neck supple. Cardiovascular:      Rate and Rhythm: Normal rate and regular rhythm. Pulses: Normal pulses. Pulmonary:      Effort: Pulmonary effort is normal. No respiratory distress. Breath sounds: Normal breath sounds. No wheezing or rales. Abdominal:      General: Bowel sounds are normal.      Palpations: Abdomen is soft. Tenderness: There is no abdominal tenderness. There is no guarding or rebound. Comments: Mild tenderness palpation of the right lower quadrant  Abdomen is soft, no guarding rebound   Musculoskeletal:      Right lower leg: No edema. Left lower leg: No edema. Skin:     General: Skin is warm and dry. Capillary Refill: Capillary refill takes less than 2 seconds. Neurological:      Mental Status: She is alert and oriented to person, place, and time. Cranial Nerves: No cranial nerve deficit.       Coordination: Coordination normal.   Psychiatric:         Mood and Affect: Mood normal.         Behavior: Behavior normal.          Procedures     Labs Reviewed   CBC WITH AUTO DIFFERENTIAL - Abnormal; Notable for the following components:       Result Value    WBC 12.8 (*)     MCV 78.5 (*)     MCH 25.1 (*)     RDW 15.9 (*)     Neutrophils Absolute 7.67 (*)     Lymphocytes Absolute 4.20 (*)     All other components within normal limits   COMPREHENSIVE METABOLIC PANEL W/ REFLEX TO MG FOR LOW K - Abnormal; Notable for the following components:    Potassium reflex Magnesium 3.3 (*)     Glucose 105 (*)     Alkaline Phosphatase 105 (*)     ALT 64 (*)     AST 46 (*)     All other components within normal limits   URINALYSIS WITH MICROSCOPIC - Abnormal; Notable for the following components:    Color, UA DARK YELLOW (*)     Clarity, UA CLOUDY (*)     Ketones, Urine TRACE (*)     Bacteria, UA MANY (*)     All other components within normal limits   LACTIC ACID, PLASMA   MAGNESIUM     CT ABDOMEN PELVIS W IV CONTRAST Additional Contrast? None   Final Result   No acute disease. Nonobstructive left renal calculus. MDM  Number of Diagnoses or Management Options  Diagnosis management comments: Patient is a 80-year-old female presents today for nausea, vomiting and right-sided abdominal pain. Physical exam shows mild tenderness palpation of the right lower quadrant, however abdomen is soft, with no guarding or rebound. Lab work and imaging was obtained. Lab work shows Mildly elevated white count of 12.8, however this is likely due to patient's nausea vomiting over the past several days. Patient is mildly hypokalemic with a potassium of 3.3, she is mildly hypomagnesemic with magnesium 1.7. Lactic is 1.5.  UA shows evidence of bacteria, however there are many epithelial cells, this is likely contaminated in origin. Patient was given magnesium and potassium in the department. She was also given Zofran for nausea. CT of the abdomen shows no acute intra-abdominal processes, they do note several nonobstructive left renal calculi. Patient will be instructed to follow-up with PCP. She understands and agrees with this plan. Patient's vitals stable for discharge. Amount and/or Complexity of Data Reviewed  Clinical lab tests: reviewed  Tests in the radiology section of CPT®: reviewed                --------------------------------------------- PAST HISTORY ---------------------------------------------  Past Medical History:  has a past medical history of Depression, Diabetes mellitus ([facility] [address] GERD (gastroesophageal reflux disease), Joint pain, and Thyroid disease. Past Surgical History:  has a past surgical history that includes Hysterectomy; Cholecystectomy; and  section. Social History:  reports that she has never smoked. She has never used smokeless tobacco. She reports that she does not drink alcohol or use drugs. Family History: family history is not on file.      The patients home medications have been reviewed.     Allergies: Celecoxib    -------------------------------------------------- RESULTS -------------------------------------------------  Labs:  Results for orders placed or performed during the hospital encounter of 10/02/20   CBC auto differential   Result Value Ref Range    WBC 12.8 (H) 4.5 - 11.5 E9/L    RBC 5.49 3.50 - 5.50 E12/L    Hemoglobin 13.8 11.5 - 15.5 g/dL    Hematocrit 43.1 34.0 - 48.0 %    MCV 78.5 (L) 80.0 - 99.9 fL    MCH 25.1 (L) 26.0 - 35.0 pg    MCHC 32.0 32.0 - 34.5 %    RDW 15.9 (H) 11.5 - 15.0 fL    Platelets 924 967 - 623 E9/L    MPV 9.1 7.0 - 12.0 fL    Neutrophils % 60.1 43.0 - 80.0 %    Immature Granulocytes % 0.2 0.0 - 5.0 %    Lymphocytes % 32.8 20.0 - 42.0 %    Monocytes % 6.0 2.0 - 12.0 %    Eosinophils % 0.7 0.0 - 6.0 %    Basophils % 0.2 0.0 - 2.0 %    Neutrophils Absolute 7.67 (H) 1.80 - 7.30 E9/L    Immature Granulocytes # 0.03 E9/L    Lymphocytes Absolute 4.20 (H) 1.50 - 4.00 E9/L    Monocytes Absolute 0.77 0.10 - 0.95 E9/L    Eosinophils Absolute 0.09 0.05 - 0.50 E9/L    Basophils Absolute 0.03 0.00 - 0.20 E9/L   Comprehensive Metabolic Panel w/ Reflex to MG   Result Value Ref Range    Sodium 136 132 - 146 mmol/L    Potassium reflex Magnesium 3.3 (L) 3.5 - 5.0 mmol/L    Chloride 103 98 - 107 mmol/L    CO2 24 22 - 29 mmol/L    Anion Gap 9 7 - 16 mmol/L    Glucose 105 (H) 74 - 99 mg/dL    BUN 7 6 - 20 mg/dL    CREATININE 0.7 0.5 - 1.0 mg/dL    GFR Non-African American >60 >=60 mL/min/1.73    GFR African American >60     Calcium 9.6 8.6 - 10.2 mg/dL    Total Protein 7.6 6.4 - 8.3 g/dL    Alb 4.0 3.5 - 5.2 g/dL    Total Bilirubin 0.7 0.0 - 1.2 mg/dL    Alkaline Phosphatase 105 (H) 35 - 104 U/L    ALT 64 (H) 0 - 32 U/L    AST 46 (H) 0 - 31 U/L   Urinalysis with Microscopic   Result Value Ref Range    Color, UA DARK YELLOW (A) Straw/Yellow    Clarity, UA CLOUDY (A) Clear    Glucose, Ur Negative Negative mg/dL    Bilirubin Urine Negative Negative    Ketones, Urine TRACE (A) Negative mg/dL    Specific Gravity, UA >=1.030 1.005 - 1.030    Blood, Urine Negative Negative    pH, UA 6.5 5.0 - 9.0    Protein, UA TRACE Negative mg/dL    Urobilinogen, Urine 0.2 <2.0 E.U./dL    Nitrite, Urine Negative Negative    Leukocyte Esterase, Urine Negative Negative    WBC, UA 2-5 0 - 5 /HPF    RBC, UA 1-3 0 - 2 /HPF    Epithelial Cells, UA MANY /HPF    Bacteria, UA MANY (A) None Seen /HPF   LACTIC ACID, PLASMA   Result Value Ref Range    Lactic Acid 1.5 0.5 - 2.2 mmol/L   Magnesium   Result Value Ref Range    Magnesium 1.7 1.6 - 2.6 mg/dL       Radiology:  CT ABDOMEN PELVIS W IV CONTRAST Additional Contrast? None   Final Result   No acute disease. Nonobstructive left renal calculus. ------------------------- NURSING NOTES AND VITALS REVIEWED ---------------------------  Date / Time Roomed:  10/2/2020 10:21 PM  ED Bed Assignment:  17/17    The nursing notes within the ED encounter and vital signs as below have been reviewed. BP (!) 143/83   Pulse 97   Temp 97.7 °F (36.5 °C)   Resp 16   Ht 5' 10\" (1.778 m)   Wt 250 lb (113.4 kg)   SpO2 98%   BMI 35.87 kg/m²   Oxygen Saturation Interpretation: Normal      ------------------------------------------ PROGRESS NOTES ------------------------------------------  I have spoken with the patient and discussed todays results, in addition to providing specific details for the plan of care and counseling regarding the diagnosis and prognosis. Their questions are answered at this time and they are agreeable with the plan. I discussed at length with them reasons for immediate return here for re evaluation. They will followup with primary care by calling their office tomorrow. --------------------------------- ADDITIONAL PROVIDER NOTES ---------------------------------  At this time the patient is without objective evidence of an acute process requiring hospitalization or inpatient management.   They have remained hemodynamically stable throughout their entire ED visit and are stable for discharge with outpatient follow-up. The plan has been discussed in detail and they are aware of the specific conditions for emergent return, as well as the importance of follow-up. New Prescriptions    ONDANSETRON (ZOFRAN-ODT) 4 MG DISINTEGRATING TABLET    Take 1 tablet by mouth 3 times daily as needed for Nausea or Vomiting       Diagnosis:  1. Generalized abdominal pain    2. Non-intractable vomiting with nausea, unspecified vomiting type    3. Diarrhea, unspecified type    4. Hypokalemia    5. Hypomagnesemia        Disposition:  Patient's disposition: Discharge to home  Patient's condition is stable.             Alley Grace, DO  Resident  10/03/20 [address] [facility],   Resident  10/03/20 66

## 2020-10-05 NOTE — PROGRESS NOTES
I feel she should have COVID nasal swab if willing.   She should then follow-up after results back, sooner as needed, back to ER persistent symptoms or other serious signs or symptoms

## 2020-10-08 ENCOUNTER — VIRTUAL VISIT (OUTPATIENT)
Dept: PRIMARY CARE CLINIC | Age: 45
End: 2020-10-08
Payer: COMMERCIAL

## 2020-10-08 PROBLEM — R71.8 MICROCYTOSIS: Status: ACTIVE | Noted: 2020-10-08

## 2020-10-08 PROBLEM — E87.6 HYPOKALEMIA: Status: ACTIVE | Noted: 2020-10-08

## 2020-10-08 PROBLEM — E73.9 LACTOSE INTOLERANCE: Status: ACTIVE | Noted: 2020-10-08

## 2020-10-08 PROBLEM — D72.829 LEUKOCYTOSIS: Status: ACTIVE | Noted: 2020-10-08

## 2020-10-08 PROBLEM — R19.7 DIARRHEA: Status: ACTIVE | Noted: 2020-10-08

## 2020-10-08 PROCEDURE — 99214 OFFICE O/P EST MOD 30 MIN: CPT | Performed by: FAMILY MEDICINE

## 2020-10-08 RX ORDER — BUPROPION HYDROCHLORIDE 150 MG/1
150 TABLET, EXTENDED RELEASE ORAL EVERY MORNING
Qty: 90 TABLET | Refills: 3 | Status: SHIPPED
Start: 2020-10-08 | End: 2021-02-26

## 2020-10-08 NOTE — ASSESSMENT & PLAN NOTE
Counseled extensively. Differential reviewed, including serious etiologies. In context of nausea and vomiting, possible self-limited gastroenteritis, resolved.   COVID test was negative at [facility]

## 2020-10-08 NOTE — PROGRESS NOTES
TeleMedicine Patient Consent    This visit was performed as a virtual video visit using a synchronous, two-way, audio-video telehealth technology platform. Patient identification was verified at the start of the visit, including the patient's telephone number and physical location. I discussed with the patient the nature of our telehealth visits, that:     1. Due to the nature of an audio- video modality, the only components of a physical exam that could be done are the elements supported by direct observation. 2. I would evaluate the patient and recommend diagnostics and treatments based on my assessment. 3. If it was felt that the patient should be evaluated in clinic or an emergency room setting, then they would be directed there. 4. Our sessions are not being recorded and that personal health information is protected. 5. Our team would provide follow up care in person if/when the patient needs it. Patient does agree to proceed with telemedicine consultation. Patient's location: home address in [facility]    Physician  location [place]     Other people involved in call:   None    This visit was completed virtually using Doxy. me    10/8/2020    TELEHEALTH EVALUATION -- Audio/Visual (During AOZWR-66 public health emergency)    Chief Complaint   Patient presents with    Anxiety     medication check     Follow-Up from Hospital     was in ER 10/02           HPI:    Yessica Kat (:  1975) has requested an audio/video evaluation for the following concern(s):    Patient presents today for follow-up on anxiety as well as ER follow-up. She is tolerating Wellbutrin well and it is helping. Symptoms have subsided nicely. She did go to the ER. She is tolerating well. She did develop an episode of diarrhea nausea vomiting. She was given an IV bolus.   She was found to be hypokalemic at 3.3 magnesium 1.7 glucose 105 ALT/AST have been chronically elevated 64/46, alk phos 105, white count was 12.8 MCV chronically low at 78.5    She feels she has developed lactose intolerance, no symptoms as long as lactose-free.   She had a CT scan showing no acute disease, there was nonobstructive left renal calculus      Most Recent Labs  CBC  Lab Results   Component Value Date    WBC 12.8 10/02/2020    WBC 8.8 05/28/2020    WBC 9.6 10/17/2019    RBC 5.49 10/02/2020    RBC 5.30 05/28/2020    RBC 5.44 10/17/2019    HGB 13.8 10/02/2020    HGB 13.3 05/28/2020    HGB 13.8 10/17/2019    HCT 43.1 10/02/2020    HCT 41.6 05/28/2020    HCT 43.5 10/17/2019    MCV 78.5 10/02/2020    MCV 78.5 05/28/2020    MCV 80.0 10/17/2019     10/02/2020     05/28/2020     10/17/2019      CMP  Lab Results   Component Value Date     10/02/2020     07/17/2020     05/28/2020    K 3.3 10/02/2020    K 3.8 07/17/2020    K 3.9 05/28/2020    K 4.1 02/22/2020    K 4.2 10/17/2019     10/02/2020     07/17/2020     05/28/2020    CO2 24 10/02/2020    CO2 24 07/17/2020    CO2 24 05/28/2020    ANIONGAP 9 10/02/2020    ANIONGAP 12 07/17/2020    ANIONGAP 11 05/28/2020    GLUCOSE 105 10/02/2020    GLUCOSE 219 07/17/2020    GLUCOSE 317 05/28/2020    BUN 7 10/02/2020    BUN 11 07/17/2020    BUN 9 05/28/2020    CREATININE 0.7 10/02/2020    CREATININE 0.5 07/17/2020    CREATININE 0.5 05/28/2020    LABGLOM >60 10/02/2020    LABGLOM >60 07/17/2020    LABGLOM >60 05/28/2020    GFRAA >60 10/02/2020    GFRAA >60 07/17/2020    GFRAA >60 05/28/2020    CALCIUM 9.6 10/02/2020    CALCIUM 8.8 07/17/2020    CALCIUM 9.0 05/28/2020    PROT 7.6 10/02/2020    PROT 7.2 07/17/2020    PROT 7.5 05/28/2020    LABALBU 4.0 10/02/2020    LABALBU 4.0 07/17/2020    LABALBU 4.0 05/28/2020    BILITOT 0.7 10/02/2020    BILITOT 1.1 07/17/2020    BILITOT 0.5 05/28/2020    ALKPHOS 105 10/02/2020    ALKPHOS 82 07/17/2020    ALKPHOS 86 05/28/2020    AST 46 10/02/2020    AST 41 07/17/2020    AST 34 05/28/2020    ALT 64 10/02/2020    ALT 51 02/22/2020    BLOODU small 11/13/2019    BLOODU Negative 10/17/2019    PHUR 6.5 10/02/2020    PHUR  02/22/2020      Comment:      <5.0    PHUR 5.5 11/13/2019    PHUR 6.0 10/17/2019    PROTEINU TRACE 10/02/2020    PROTEINU Negative 02/22/2020    PROTEINU Negative 11/13/2019    PROTEINU Negative 10/17/2019    UROBILINOGEN 0.2 10/02/2020    UROBILINOGEN 0.2 10/17/2019    UROBILINOGEN 0.2 07/26/2019    NITRU Negative 10/02/2020    NITRU Negative 02/22/2020    NITRU Negative 10/17/2019    LEUKOCYTESUR Negative 10/02/2020    LEUKOCYTESUR Negative 02/22/2020    LEUKOCYTESUR trace 11/13/2019    LEUKOCYTESUR Negative 10/17/2019     Urine Micro/Albumin Ratio  Lab Results   Component Value Date    MALBCR 118 02/22/2020    MALBCR 11.6 10/17/2019    MALBCR 6.3 07/26/2019           ROS:  Const: Denies chills, fever, malaise and sweats. Eyes: Denies discharge, pain, redness and visual disturbance. ENMT: Denies earaches, other ear symptoms. Denies nasal or sinus symptoms other than stated  above. Denies mouth and tongue lesions and sore throat. CV: Denies chest discomfort, pain; diaphoresis, dizziness, edema, lightheadedness, orthopnea,  palpitations, syncope and near syncopal episode or any exertional symptoms  Resp: Denies cough, hemoptysis, pleuritic pain, SOB, sputum production and wheezing. GI: Denies abdominal pain, change in bowel habits, hematochezia, melena, nausea and vomiting. : Denies urinary symptoms including dysuria , urgency, frequency or hematuria. Musculo: Chronic fibromyalgia-like symptoms, stable skin: Denies bruising and rash.   Neuro: Denies headache, numbness, stiff neck, tingling and focal weakness slurred speech or facial  droop  Hema/Lymph: Denies bleeding/bruising tendency and enlarged lymph nodes         Current Outpatient Medications:     buPROPion (WELLBUTRIN SR) 150 MG extended release tablet, Take 1 tablet by mouth every morning, Disp: 90 tablet, Rfl: 3    ondansetron (ZOFRAN-ODT) 4 MG Procedure Laterality Date     SECTION      CHOLECYSTECTOMY      HYSTERECTOMY       No family history on file. Social History     Tobacco Use    Smoking status: Never Smoker    Smokeless tobacco: Never Used   Substance Use Topics    Alcohol use: No    Drug use: No     Social History     Social History Narrative    PMH:    Problem List: Eruption, Infestation by Sarcoptes scabiei sherly hominis, Dysthymic disorder, Peptic reflux    disease, Dysthymia, Hypothyroidism, Adult health examination        Health Maintenance:    Mammogram - (10/1/2018)    Mammogram Screening - (10/1/2018)    Influenza Vaccination - (2015)        Medical Problems:    vitamin D def, Asthma    Hypothyroidism - partial thyroidectomy (half of left lobe); benign nodule    Anxiety    GERD - EGD     Seasonal Allergies, Type 2 Diabetes        Surgical Hx:    C/S    Partial Hysterectomy - left ovary remains (bleeding issues)    Benita        FH:    Father:    . (Hx)    Mother:    . (Hx)    Mom - thyroid cancer, melanoma    Dad - depression    cousin - melanoma        SH:    . (Marital)Stay at home;    3 kids (8,7,5 as of ). No complications during pregnancy. Kleber Reyes ; works at [employer]    Personal Habits: Cigarette Use: Nonsmoker. Alcohol: Denies alcohol use. PHYSICAL EXAMINATION:  [ INSTRUCTIONS:  \"[x]\" Indicates a positive item  \"[]\" Indicates a negative item  -- DELETE ALL ITEMS NOT EXAMINED]  Vital Signs: (As obtained by patient/caregiver or practitioner observation)    There were no vitals filed for this visit.       Blood pressure-  Heart rate-    Respiratory rate-    Temperature-  Pulse oximetry-     Constitutional: [x] Appears well-developed and well-nourished [x] No apparent distress      [] Abnormal-   Mental status  [x] Alert and awake  [x] Oriented to person/place/time [x]Able to follow commands      Eyes:  EOM    [x]  Normal  [] Abnormal-  Sclera  [x]  Normal  [] Abnormal -         Discharge [x] None visible  [] Abnormal -    HENT:   [x] Normocephalic, atraumatic. [] Abnormal   [x] Mouth/Throat: Mucous membranes are moist.     External Ears [x] Normal  [] Abnormal-     Neck: [x] No visualized mass     Pulmonary/Chest: [x] Respiratory effort normal.  [x] No visualized signs of difficulty breathing or respiratory distress        [] Abnormal-      Musculoskeletal:   [x] Normal gait with no signs of ataxia         [x] Normal range of motion of neck        [] Abnormal-       Neurological:        [x] No Facial Asymmetry (Cranial nerve 7 motor function) (limited exam to video visit)          [x] No gaze palsy        [] Abnormal-         Skin:        [x] No significant exanthematous lesions or discoloration noted on facial skin         [] Abnormal-            Psychiatric:       [x] Normal Affect [x] No Hallucinations        [] Abnormal-     Other pertinent observable physical exam findings-     ASSESSMENT/PLAN:   Diagnosis Orders   1. Anxiety and depression  buPROPion (WELLBUTRIN SR) 150 MG extended release tablet   2. Microcytosis  Ferritin   3. Diarrhea, unspecified type     4. Type 2 diabetes mellitus with hyperglycemia, without long-term current use of insulin (HCC)     5. Liver function study, abnormal     6. Thyroid nodule     7. Hypothyroidism, unspecified type     8. Vitamin D deficiency     9. LEXIS (obstructive sleep apnea)     10. Inflammatory polyarthropathy ([address]     11. Mixed hyperlipidemia     12. Vitamin B12 deficiency     13. Health maintenance examination     14. Kidney stone on left side     15. Leukocytosis, unspecified type     16. Lactose intolerance     17. Hypokalemia         Diarrhea  Counseled extensively. Differential reviewed, including serious etiologies. In context of nausea and vomiting, possible self-limited gastroenteritis, resolved. COVID test was negative at independent pharmacy    Leukocytosis  Counseled extensively. Differential reviewed, including serious etiologies.   Noted in ER.  Recheck. Symptoms resolved    Lactose intolerance  Counseled. Recommend lactose Free diet versus Lactaid    Hypokalemia  Counseled. Supplemented in ER. Recheck. Anxiety and depression  Was doing well sertraline but because of the chronic pain we changed to Cymbalta   which is helping her with pain, was doing well psychologically but then was getting breakthrough depression. She wanted to continue Cymbalta as it was helping with pain. We did discuss option of going back to SSRI, switching to a med such as  Pristiq with more dosing options. However did well with Wellbutrin the past, this was added -Wellbutrin  mg every morning at 9/17/2020 visit, doing well.if underlying anxiety we discussed BuSpar but denies significant symptoms at this time. She was refered to [facility] counseling for evaluation and treatment as well, had intake and has an appointment October 22. .  Adamantly denies SI/HI          Type 2 diabetes mellitus with hyperglycemia, without long-term current use of insulin (HCC)  Tolerating medications. ,  Doing much better she states on On Trulicity with precautions. also on metformin with precautions reviewed including lactic  acidosis diarrhea, recall reviewed.  On Glucotrol with precautions including hypoglycemia. Watch ambulatory. If out  of range, let us know. Stressed importance of daily foot examinations, regular eye  examinations etc. Micro-and macrovascular complications reviewed, hyper or  hypoglycemic precautions reviewed . cont lifestyle.       hemoglobin A1c goals reviewed.     Did not tolerate Jardiance because of recurrent yeast infections. Continue per Dr. Oksana Fontenot.     Will consider ACE inhibitor but continues to decline at this time.     Liver function study, abnormal  Counseled extensively. Differential reviewed, including serious etiologies. h/o fatty  liver. Precautions reviewed. Lifestyle modification appropriate diet and weight loss  reviewed.  Risks of even this leading to cirrhosis reviewed. Other than basic  monitoring on interested in other evaluation or treatment, in-depth blood work,  imaging or otherwise. . normalized, HEP B and C neg (she started hepatitis A and B series 2/25/2020, will need hepatitis A #2 and hepatitis B #3 Watch closely with the initiation of statin last time.              Inflammatory polyarthropathy (HCC)  Chronic joint pain. Failed mtx. did not tolerated. gi intolerance. bw neg. recommend cont per dr dick/rheumatology but declines follow-up now. Relatively stable on Cymbalta although slightly increased recently. Consider Lyrica. Risk benefits reviewed. DIANELYS still elevated, 1-1 60-, has appointment with Dr. Gayatri Olivares in October.                          Thyroid nodule  Counseled extensively. Differential reviewed, including serious etiologies. History of  thyroid nodule resected with \"various cells\" but ultimately deemed to be benign. She  had a thyroid ultrasound 10/18 showing \"two benign cysts\" and 2 lymph nodes \"not  overtly suspicious.    Follows with Dr. Shelley Page, states she had a recent ultrasound and saw him recently.     Hypothyroidism  With history of partial left thyroidectomy. montior blood work. On Synthroid.    Follows with Dr. Cheek/Vinh.   Thyroid ultrasound 12/15 showed thyroid goiter , 3mm nodule without  suspicious features and partial left lobectomy . , repeat 10/18 .  TFTs stable. She states she has had a recent ultrasound and saw endocrinology couple weeks ago.     Health maintenance examination  Health maintenance issues discussed at length 9/18. Encouraged yearly physicals.  Importance and what this entails reviewed. Due for hepatitis #3 Sometime after November 28      Vitamin D deficiency  cont approp supplementation                  LEXIS (obstructive sleep apnea)  Doing very well with CPAP. Uses 6-7 hours per night 7 days a week with great  benefit.        Anemia  Chronically Low MCV, monitor.  Iron and hemoglobin electrophoresis have been  normal.  MCV fluctuates. Counseled extensively. Differential reviewed, including serious etiologies. Declines further evaluation or treatment other than as noted. Notify me if changes  mind. Asymptomatic.  Monitor. Recheck ferritin next blood work        Calculus, kidney, left  CAT scan showing nonobstructing kidney stone left, 1/16. Declines other evaluation  treatment now. Uric acid 3/16 negative. Proper hydration low oxalate diet. Declines  urology referral at this time. asx. CT 10/20 showed nonobstructive left kidney stone as well.        Mixed hyperlipidemia  Goals reviewed. Madyson Trimble is on Crestor, tolerating. Declines change. Risk benefits ADRs reviewed.  Lifestyle modification reviewed. risk of  hyperlipidemia reviewed monitor  Counseled extensively.        Gastroesophageal reflux disease without esophagitis  Asymptomatic as long as taking therapy.    Breakthrough off medication.  She is tolerating omeprazole 20 mg daily, previously was on 40 mg daily.  Declines further testing and understands  risk of masking underlying etiologies. Risks of meds also reviewed incl RI/Electrolye  malabsorption.  EGD 2011. Showed gerd     Vitamin B12 deficiency  History of.  Appropriate supplementation reviewed.  Risk of deficiency reviewed.  Monitor. Diarrhea  Counseled extensively. Differential reviewed, including serious etiologies. In context of nausea and vomiting, possible self-limited gastroenteritis, resolved. COVID test was negative at [facility] pharmacy    Leukocytosis  Counseled extensively. Differential reviewed, including serious etiologies. Noted in ER. Recheck. Symptoms resolved    Lactose intolerance  Counseled. Recommend lactose Free diet versus Lactaid    Hypokalemia  Counseled. Supplemented in ER. Recheck. Counseled extensively and differential diagnoses of above were reviewed, including serious etiologies.  Side effects and interactions of medications were reviewed. Plan as above:  Counseled. Refilled Wellbutrin. Added ferritin to blood work. She did not get blood work as previously ordered but says she will do so in the next week or so and follow-up in about a month sooner as needed    As long as symptoms steadily improve/resolve and medical conditions are following the expected course, FU as below, sooner PRN      Return in about 1 month (around 11/8/2020) for virtual.      Time spent: Greater than Not billed by time      Nico Beltre is a 38 y.o. female being evaluated by a Virtual Visit (video visit) encounter to address concerns as mentioned above. A caregiver was present when appropriate. Due to this being a TeleHealth encounter (During [place] [address] public [facility] emergency), evaluation of the following organ systems was limited: Vitals/Constitutional/EENT/Resp/CV/GI//MS/Neuro/Skin/Heme-Lymph-Imm. Pursuant to the emergency declaration under the [address] [place] [address] authority and the [employer] and [employer] Act, this Virtual Visit was conducted with patient's (and/or legal guardian's) consent, to reduce the patient's risk of exposure to COVID-19 and provide necessary medical care. The patient (and/or legal guardian) has also been advised to contact this office for worsening conditions or problems, and seek emergency medical treatment and/or call 911 if deemed necessary. Services were provided through a video synchronous discussion virtually to substitute for in-person clinic visit. Various options to be seen in person were discussed. Patients are advised to check with insurance company to ensure coverage and to fully understand benefits and cost prior to any testing to try to avoid unexpected charges. This note was created with the assistance of voice recognition software. Inadvertent errors may be present.        Signs and symptoms to watch for were discussed. Serious signs and symptoms reviewed. ER if any    --Panchito Smith MD on 10/8/2020 at 3:37 PM    An electronic signature was used to authenticate this note.

## 2020-11-07 PROBLEM — Z00.00 HEALTH MAINTENANCE EXAMINATION: Status: RESOLVED | Noted: 2019-07-25 | Resolved: 2020-11-07

## 2020-11-19 ENCOUNTER — OFFICE VISIT (OUTPATIENT)
Dept: FAMILY MEDICINE CLINIC | Age: 45
End: 2020-11-19
Payer: COMMERCIAL

## 2020-11-19 VITALS
BODY MASS INDEX: 36.73 KG/M2 | DIASTOLIC BLOOD PRESSURE: 68 MMHG | HEART RATE: 112 BPM | TEMPERATURE: 97.5 F | WEIGHT: 256 LBS | SYSTOLIC BLOOD PRESSURE: 124 MMHG | OXYGEN SATURATION: 98 %

## 2020-11-19 PROCEDURE — 99213 OFFICE O/P EST LOW 20 MIN: CPT | Performed by: FAMILY MEDICINE

## 2020-11-19 RX ORDER — AMOXICILLIN 250 MG/1
250 CAPSULE ORAL 3 TIMES DAILY
Qty: 30 CAPSULE | Refills: 0 | Status: SHIPPED | OUTPATIENT
Start: 2020-11-19 | End: 2020-11-29

## 2020-11-19 RX ORDER — CETIRIZINE HYDROCHLORIDE 10 MG/1
10 TABLET ORAL DAILY
Qty: 30 TABLET | Refills: 1 | Status: SHIPPED | OUTPATIENT
Start: 2020-11-19

## 2020-11-19 ASSESSMENT — ENCOUNTER SYMPTOMS
SINUS PRESSURE: 1
SINUS PAIN: 1
RESPIRATORY NEGATIVE: 1
SORE THROAT: 0
EYES NEGATIVE: 1
RHINORRHEA: 1

## 2020-11-19 NOTE — PROGRESS NOTES
20  Hi Berg : 1975 Sex: female  Age: 41 y.o. Chief Complaint   Patient presents with    Congestion     x1w     Cough       This patient is a 45-year-old white female with a chief complaint of nasal congestion nasal congestion and drainage minimal cough 1 weeks duration. No fevers chills shortness of breath no loss of smell or taste no nausea vomiting or diarrhea. Review of Systems   Constitutional: Negative. HENT: Positive for congestion, postnasal drip, rhinorrhea, sinus pressure, sinus pain and sneezing. Negative for sore throat. Eyes: Negative. Respiratory: Negative. Cardiovascular: Negative.           Current Outpatient Medications:     cetirizine (ZYRTEC) 10 MG tablet, Take 1 tablet by mouth daily, Disp: 30 tablet, Rfl: 1    amoxicillin (AMOXIL) 250 MG capsule, Take 1 capsule by mouth 3 times daily for 10 days, Disp: 30 capsule, Rfl: 0    buPROPion (WELLBUTRIN SR) 150 MG extended release tablet, Take 1 tablet by mouth every morning, Disp: 90 tablet, Rfl: 3    ondansetron (ZOFRAN-ODT) 4 MG disintegrating tablet, Take 1 tablet by mouth 3 times daily as needed for Nausea or Vomiting, Disp: 21 tablet, Rfl: 0    glipiZIDE (GLUCOTROL XL) 10 MG extended release tablet, Take 1 tablet by mouth 2 times daily, Disp: 60 tablet, Rfl: 5    Semaglutide,0.25 or 0.5MG/DOS, (OZEMPIC, 0.25 OR 0.5 MG/DOSE,) 2 MG/1.5ML SOPN, Inject 0.5 mg into the skin every 7 days, Disp: 1 pen, Rfl: 5    Dulaglutide (TRULICITY SC), Inject into the skin, Disp: , Rfl:     levothyroxine (SYNTHROID) 75 MCG tablet, Take 1 tablet by mouth Daily, Disp: 90 tablet, Rfl: 3    rosuvastatin (CRESTOR) 10 MG tablet, Take 1 tablet by mouth daily, Disp: 30 tablet, Rfl: 3    Blood Glucose Monitoring Suppl MISC, Blood glucose meter - use as directed, Disp: 1 each, Rfl: 1    blood glucose monitor strips, Use to check blood sugar 3 times/day, Disp: 100 strip, Rfl: 5    Lancets MISC, Use to test blood sugar 3 times a day, Disp: 100 each, Rfl: 5    aspirin 81 MG EC tablet, Take 81 mg by mouth daily, Disp: , Rfl:     B Complex-C (SUPER B COMPLEX/VITAMIN C PO), Take by mouth daily, Disp: , Rfl:     loratadine (CLARITIN) 10 MG tablet, Take 10 mg by mouth daily, Disp: , Rfl:     ONE TOUCH ULTRASOFT LANCETS MISC, Test 2 times/day, Disp: 100 each, Rfl: 3    metFORMIN (GLUCOPHAGE-XR) 500 MG extended release tablet, Take 2 tablets by mouth 2 times daily, Disp: 360 tablet, Rfl: 3    omeprazole (PRILOSEC) 20 MG delayed release capsule, Take 1 capsule by mouth daily, Disp: 90 capsule, Rfl: 3    Cholecalciferol (VITAMIN D3) 2000 units CAPS, Take 2,000 Units by mouth daily , Disp: , Rfl:   Allergies   Allergen Reactions    Celecoxib Hives       Past Medical History:   Diagnosis Date    Depression     Diabetes mellitus (HCC)     GERD (gastroesophageal reflux disease)     Joint pain     Thyroid disease      Past Surgical History:   Procedure Laterality Date     SECTION      CHOLECYSTECTOMY      HYSTERECTOMY       No family history on file. Social History     Tobacco Use    Smoking status: Never Smoker    Smokeless tobacco: Never Used   Substance Use Topics    Alcohol use: No    Drug use: No        Vitals:    20 1315   BP: 124/68   Pulse: 112   Temp: 97.5 °F (36.4 °C)   SpO2: 98%   Weight: 256 lb (116.1 kg)       Physical Exam  Vitals signs reviewed. Constitutional:       Appearance: Normal appearance. HENT:      Head: Normocephalic and atraumatic. Right Ear: Tympanic membrane, ear canal and external ear normal. There is no impacted cerumen. Left Ear: Tympanic membrane and ear canal normal. There is no impacted cerumen. Neck:      Musculoskeletal: No neck rigidity or muscular tenderness. Cardiovascular:      Rate and Rhythm: Normal rate and regular rhythm. Heart sounds: No murmur. Pulmonary:      Effort: Pulmonary effort is normal.      Breath sounds: Normal breath sounds.  No wheezing or rhonchi. Lymphadenopathy:      Cervical: No cervical adenopathy. Neurological:      Mental Status: She is alert. Assessment and Plan:  Katya Avendano was seen today for congestion and cough. Diagnoses and all orders for this visit:    Acute non-recurrent frontal sinusitis    Other orders  -     cetirizine (ZYRTEC) 10 MG tablet; Take 1 tablet by mouth daily  -     amoxicillin (AMOXIL) 250 MG capsule; Take 1 capsule by mouth 3 times daily for 10 days        Orders Placed This Encounter   Medications    cetirizine (ZYRTEC) 10 MG tablet     Sig: Take 1 tablet by mouth daily     Dispense:  30 tablet     Refill:  1    amoxicillin (AMOXIL) 250 MG capsule     Sig: Take 1 capsule by mouth 3 times daily for 10 days     Dispense:  30 capsule     Refill:  0        Patient advised to follow up with PCP as needed.         Seen By:  Nikhil Wall DO

## 2020-11-30 ENCOUNTER — OFFICE VISIT (OUTPATIENT)
Dept: PRIMARY CARE CLINIC | Age: 45
End: 2020-11-30
Payer: COMMERCIAL

## 2020-11-30 VITALS
WEIGHT: 254 LBS | OXYGEN SATURATION: 97 % | TEMPERATURE: 98.2 F | SYSTOLIC BLOOD PRESSURE: 128 MMHG | BODY MASS INDEX: 36.45 KG/M2 | DIASTOLIC BLOOD PRESSURE: 72 MMHG | HEART RATE: 107 BPM

## 2020-11-30 PROCEDURE — 99214 OFFICE O/P EST MOD 30 MIN: CPT | Performed by: FAMILY MEDICINE

## 2020-11-30 ASSESSMENT — PATIENT HEALTH QUESTIONNAIRE - PHQ9
SUM OF ALL RESPONSES TO PHQ QUESTIONS 1-9: 0
2. FEELING DOWN, DEPRESSED OR HOPELESS: 0
1. LITTLE INTEREST OR PLEASURE IN DOING THINGS: 0
SUM OF ALL RESPONSES TO PHQ9 QUESTIONS 1 & 2: 0
SUM OF ALL RESPONSES TO PHQ QUESTIONS 1-9: 0
SUM OF ALL RESPONSES TO PHQ QUESTIONS 1-9: 0

## 2020-11-30 NOTE — PROGRESS NOTES
20  Nkechi Cruz : 1975 Sex: female  Age: 41 y.o. Chief Complaint   Patient presents with    Breast Problem     left x 5 days        HPI  HPI:    Patient presents today with concerns over her left breast for about 5 days. Her  noticed that the left seemed larger. She notes that she is always had intermittent tenderness but it lasted a few days now which is longer than usual.  At one point noticed her left nipple was flat but this resolved. She feels her left breast is larger. \"Itches\" inside at times. No nipple discharge. No systemic symptoms. No fever chills malaise unexplained weight loss etc.  Denies family history of breast cancer. Did not get follow-up blood work for me yet. No other complaints or concerns.       ROS:  As above      Current Outpatient Medications:     cetirizine (ZYRTEC) 10 MG tablet, Take 1 tablet by mouth daily, Disp: 30 tablet, Rfl: 1    buPROPion (WELLBUTRIN SR) 150 MG extended release tablet, Take 1 tablet by mouth every morning, Disp: 90 tablet, Rfl: 3    glipiZIDE (GLUCOTROL XL) 10 MG extended release tablet, Take 1 tablet by mouth 2 times daily, Disp: 60 tablet, Rfl: 5    Semaglutide,0.25 or 0.5MG/DOS, (OZEMPIC, 0.25 OR 0.5 MG/DOSE,) 2 MG/1.5ML SOPN, Inject 0.5 mg into the skin every 7 days, Disp: 1 pen, Rfl: 5    levothyroxine (SYNTHROID) 75 MCG tablet, Take 1 tablet by mouth Daily, Disp: 90 tablet, Rfl: 3    rosuvastatin (CRESTOR) 10 MG tablet, Take 1 tablet by mouth daily, Disp: 30 tablet, Rfl: 3    Blood Glucose Monitoring Suppl MISC, Blood glucose meter - use as directed, Disp: 1 each, Rfl: 1    blood glucose monitor strips, Use to check blood sugar 3 times/day, Disp: 100 strip, Rfl: 5    Lancets MISC, Use to test blood sugar 3 times a day, Disp: 100 each, Rfl: 5    aspirin 81 MG EC tablet, Take 81 mg by mouth daily, Disp: , Rfl:     B Complex-C (SUPER B COMPLEX/VITAMIN C PO), Take by mouth daily, Disp: , Rfl:     loratadine (CLARITIN) 10 MG tablet, Take 10 mg by mouth daily, Disp: , Rfl:     metFORMIN (GLUCOPHAGE-XR) 500 MG extended release tablet, Take 2 tablets by mouth 2 times daily, Disp: 360 tablet, Rfl: 3    omeprazole (PRILOSEC) 20 MG delayed release capsule, Take 1 capsule by mouth daily, Disp: 90 capsule, Rfl: 3    Cholecalciferol (VITAMIN D3) 2000 units CAPS, Take 2,000 Units by mouth daily , Disp: , Rfl:   Allergies   Allergen Reactions    Celecoxib Hives       Past Medical History:   Diagnosis Date    Depression     Diabetes mellitus (HCC)     GERD (gastroesophageal reflux disease)     Joint pain     Thyroid disease      Past Surgical History:   Procedure Laterality Date     SECTION      CHOLECYSTECTOMY      HYSTERECTOMY       No family history on file. Social History     Tobacco Use    Smoking status: Never Smoker    Smokeless tobacco: Never Used   Substance Use Topics    Alcohol use: No    Drug use: No      Social History     Social History Narrative    PMH:    Problem List: Eruption, Infestation by Sarcoptes scabiei sherly hominis, Dysthymic disorder, Peptic reflux    disease, Dysthymia, Hypothyroidism, Adult health examination        Health Maintenance:    Mammogram - (10/1/2018)    Mammogram Screening - (10/1/2018)    Influenza Vaccination - (2015)        Medical Problems:    vitamin D def, Asthma    Hypothyroidism - partial thyroidectomy (half of left lobe); benign nodule    Anxiety    GERD - EGD     Seasonal Allergies, Type 2 Diabetes        Surgical Hx:    C/S    Partial Hysterectomy - left ovary remains (bleeding issues)    Benita        FH:    Father:    . (Hx)    Mother:    . (Hx)    Mom - thyroid cancer, melanoma    Dad - depression    cousin - melanoma        SH:    . (Marital)Stay at home;    3 kids (8,7,5 as of ). No complications during pregnancy. Shital Fisher ; works at [employer]    Personal Habits: Cigarette Use: Nonsmoker. Alcohol: Denies alcohol use.         Vitals: 11/30/20 0929   BP: 128/72   Pulse: 107   Temp: 98.2 °F (36.8 °C)   SpO2: 97%   Weight: 254 lb (115.2 kg)     Wt Readings from Last 3 Encounters:   11/30/20 254 lb (115.2 kg)   11/19/20 256 lb (116.1 kg)   10/02/20 250 lb (113.4 kg)        Physical Exam    Exam:  Const: Appears comfortable. No signs of acute distress present. Head/Face: Atraumatic, normocephalic on inspection. Eyes: No discharge from the eyes. Sclerae clear. ENMT: Unremarkable  Neck: Supple. Palpation reveals no adenopathy. No masses appreciated. No JVD. Resp: Respirations are unlabored. Clear to auscultation bilaterally. No rales, rhonchi or wheezes appreciated over the  lungs bilaterally. CV: RRR   Extremities: No clubbing or cyanosis. No edema of the lower limbs  bilaterally. No calf inflammation or tenderness. Abdomen: Abdomen is soft, nontender, and nondistended. No abdominal masses  appreciated. No palpable hepatosplenomegaly. Bowel sounds are normoactive. Skin: Dry and warm with no rash. Muscular skeletal: No acute joint inflammation. Neuro:Grossly intact without focal deficit  Breast exam-right normal without nodularity nipple flattening inversion adenopathy or otherwise. Left reveals questionable fibrous tissue lateral, but no discrete nodules appreciated, no nipple flattening inversion or adenopathy. No gross size difference    Office Labs This Visit :  No results found for this visit on 11/30/20. Assessment and Plan:    1. Neoplasm of uncertain behavior of breast, left  Counseled extensively. Differential reviewed, including serious etiologies. This could be fibrocystic but we need to rule out concerning causes such as malignancy as she knows. She has not seen a gynecologist in a while and I did recommend she follow-up. We did discuss a second opinion from a breast surgeon. This point were going to start with a diagnostic mammogram, states she had one a year ago although I do not see it in the chart.   She states she usually [facility]. Check ultrasound as well. Get blood work as ordered. Follow-up 2 weeks sooner as needed. Notify if any persistence or worsening symptoms.  - GEORGE DIGITAL DIAGNOSTIC BILATERAL PER PROTOCOL; Future  - US BREAST COMPLETE LEFT; Future      No problem-specific Assessment & Plan notes found for this encounter. Plan as above. Counseled extensively and differential diagnoses relevant to above were reviewed, including serious etiologies. Side effects and interactions of medications were reviewed. As long as symptoms steadily improve/resolve, and medical conditions follow the expected course, FU as below, sooner PRN. Return in about 2 weeks (around 12/14/2020), or if symptoms worsen or fail to improve, for Review BW, review mammogram/ultrasound. Signs and symptoms to watch for discussed, serious signs and symptoms reviewed. ER if any. Ruta Dance, MD    Patients are advised to check with insurance company to ensure coverage and to fully understand benefits and cost prior to any testing. This note was created with the assistance of voice recognition software. Document was reviewed however may contain grammatical errors.

## 2020-12-08 ENCOUNTER — OFFICE VISIT (OUTPATIENT)
Dept: FAMILY MEDICINE CLINIC | Age: 45
End: 2020-12-08
Payer: COMMERCIAL

## 2020-12-08 VITALS
SYSTOLIC BLOOD PRESSURE: 124 MMHG | OXYGEN SATURATION: 98 % | BODY MASS INDEX: 35.79 KG/M2 | TEMPERATURE: 97.5 F | HEART RATE: 109 BPM | WEIGHT: 250 LBS | RESPIRATION RATE: 18 BRPM | DIASTOLIC BLOOD PRESSURE: 80 MMHG | HEIGHT: 70 IN

## 2020-12-08 DIAGNOSIS — Z20.822 EXPOSURE TO COVID-19 VIRUS: ICD-10-CM

## 2020-12-08 PROCEDURE — 99213 OFFICE O/P EST LOW 20 MIN: CPT | Performed by: PHYSICIAN ASSISTANT

## 2020-12-08 RX ORDER — AZITHROMYCIN 250 MG/1
250 TABLET, FILM COATED ORAL SEE ADMIN INSTRUCTIONS
Qty: 6 TABLET | Refills: 0 | Status: SHIPPED | OUTPATIENT
Start: 2020-12-08 | End: 2020-12-13

## 2020-12-08 RX ORDER — CHLORPHENIRAMINE MALEATE 4 MG/1
4 TABLET ORAL EVERY 6 HOURS PRN
Qty: 20 TABLET | Refills: 0 | Status: SHIPPED
Start: 2020-12-08 | End: 2021-05-04

## 2020-12-08 RX ORDER — BENZONATATE 100 MG/1
100 CAPSULE ORAL 3 TIMES DAILY PRN
Qty: 21 CAPSULE | Refills: 0 | Status: SHIPPED | OUTPATIENT
Start: 2020-12-08 | End: 2020-12-15

## 2020-12-08 NOTE — PROGRESS NOTES
20  Aida Garibay : 1975 Sex: female  Age 41 y.o. Subjective:  Chief Complaint   Patient presents with    Cough     cough, SOB, fever since           HPI:   Aida Garibay , 41 y.o. female presents to express care for evaluation of cough, shortness of breath, fever. The patient has had the symptoms since . The patient has had increased cough, congestion, no definitive exposures although was exposed to  who was exposed at work. The patient is not having any loss of smell or taste but she does note some different taste of household items. The patient is not having any back pain or flank pain. The patient has not had any syncope. Not currently on any medications. ROS:   Unless otherwise stated in this report the patient's positive and negative responses for review of systems for constitutional, eyes, ENT, cardiovascular, respiratory, gastrointestinal, neurological, , musculoskeletal, and integument systems and related systems to the presenting problem are either stated in the history of present illness or were not pertinent or were negative for the symptoms and/or complaints related to the presenting medical problem. Positives and pertinent negatives as per HPI. All others reviewed and are negative. PMH:     Past Medical History:   Diagnosis Date    Depression     Diabetes mellitus ([address]     GERD (gastroesophageal reflux disease)     Joint pain     Thyroid disease        Past Surgical History:   Procedure Laterality Date     SECTION      CHOLECYSTECTOMY      HYSTERECTOMY         No family history on file.     Medications:     Current Outpatient Medications:     azithromycin (ZITHROMAX) 250 MG tablet, Take 1 tablet by mouth See Admin Instructions for 5 days 500mg on day 1 followed by 250mg on days 2 - 5, Disp: 6 tablet, Rfl: 0    chlorpheniramine (ALLER-CHLOR) 4 MG tablet, Take 1 tablet by mouth every 6 hours as needed for Allergies, Disp: 20 tablet, Rfl: 0    benzonatate (TESSALON) 100 MG capsule, Take 1 capsule by mouth 3 times daily as needed for Cough, Disp: 21 capsule, Rfl: 0    cetirizine (ZYRTEC) 10 MG tablet, Take 1 tablet by mouth daily, Disp: 30 tablet, Rfl: 1    buPROPion (WELLBUTRIN SR) 150 MG extended release tablet, Take 1 tablet by mouth every morning, Disp: 90 tablet, Rfl: 3    glipiZIDE (GLUCOTROL XL) 10 MG extended release tablet, Take 1 tablet by mouth 2 times daily, Disp: 60 tablet, Rfl: 5    Semaglutide,0.25 or 0.5MG/DOS, (OZEMPIC, 0.25 OR 0.5 MG/DOSE,) 2 MG/1.5ML SOPN, Inject 0.5 mg into the skin every 7 days, Disp: 1 pen, Rfl: 5    levothyroxine (SYNTHROID) 75 MCG tablet, Take 1 tablet by mouth Daily, Disp: 90 tablet, Rfl: 3    rosuvastatin (CRESTOR) 10 MG tablet, Take 1 tablet by mouth daily, Disp: 30 tablet, Rfl: 3    Blood Glucose Monitoring Suppl MISC, Blood glucose meter - use as directed, Disp: 1 each, Rfl: 1    blood glucose monitor strips, Use to check blood sugar 3 times/day, Disp: 100 strip, Rfl: 5    Lancets MISC, Use to test blood sugar 3 times a day, Disp: 100 each, Rfl: 5    aspirin 81 MG EC tablet, Take 81 mg by mouth daily, Disp: , Rfl:     B Complex-C (SUPER B COMPLEX/VITAMIN C PO), Take by mouth daily, Disp: , Rfl:     loratadine (CLARITIN) 10 MG tablet, Take 10 mg by mouth daily, Disp: , Rfl:     metFORMIN (GLUCOPHAGE-XR) 500 MG extended release tablet, Take 2 tablets by mouth 2 times daily, Disp: 360 tablet, Rfl: 3    omeprazole (PRILOSEC) 20 MG delayed release capsule, Take 1 capsule by mouth daily, Disp: 90 capsule, Rfl: 3    Cholecalciferol (VITAMIN D3) 2000 units CAPS, Take 2,000 Units by mouth daily , Disp: , Rfl:     Allergies:      Allergies   Allergen Reactions    Celecoxib Hives       Social History:     Social History     Tobacco Use    Smoking status: Never Smoker    Smokeless tobacco: Never Used   Substance Use Topics    Alcohol use: No    Drug use: No       Patient lives at The patient is to return to express care or go directly to the emergency department should any of the signs or symptoms worsen. The patient is to followup with primary care physician in 2-3 days for repeat evaluation. The patient has no other questions or concerns at this time the patient will be discharged home. The patient does have ability to monitor pulse ox at home. She will continue to do this. The patient understands plan has no other questions or concerns. Clinical Impression:   Mehran Thibodeaux was seen today for cough. Diagnoses and all orders for this visit:    Exposure to COVID-19 virus  -     COVID-19 Ambulatory; Future  -     azithromycin (ZITHROMAX) 250 MG tablet; Take 1 tablet by mouth See Admin Instructions for 5 days 500mg on day 1 followed by 250mg on days 2 - 5    Acute upper respiratory infection, unspecified    Cough    Fever, unspecified fever cause    Other orders  -     chlorpheniramine (ALLER-CHLOR) 4 MG tablet; Take 1 tablet by mouth every 6 hours as needed for Allergies  -     benzonatate (TESSALON) 100 MG capsule; Take 1 capsule by mouth 3 times daily as needed for Cough        The patient is to call for any concerns or return if any of the signs or symptoms worsen. The patient is to follow-up with PCP in the next 2-3 days for repeat evaluation repeat assessment or go directly to the emergency department.      SIGNATURE: Audra Medrano III, PA-C

## 2020-12-10 LAB
SARS-COV-2: NOT DETECTED
SOURCE: NORMAL

## 2020-12-29 ENCOUNTER — HOSPITAL ENCOUNTER (OUTPATIENT)
Dept: GENERAL RADIOLOGY | Age: 45
Discharge: HOME | End: 2020-12-31
Payer: COMMERCIAL

## 2020-12-29 DIAGNOSIS — D48.62 NEOPLASM OF UNCERTAIN BEHAVIOR OF BREAST, LEFT: ICD-10-CM

## 2020-12-29 PROCEDURE — G0279 TOMOSYNTHESIS, MAMMO: HCPCS

## 2020-12-29 PROCEDURE — 76642 ULTRASOUND BREAST LIMITED: CPT

## 2021-01-14 ENCOUNTER — TELEPHONE (OUTPATIENT)
Dept: ENDOCRINOLOGY | Age: 46
End: 2021-01-14

## 2021-01-14 ENCOUNTER — HOSPITAL ENCOUNTER (OUTPATIENT)
Age: 46
Discharge: HOME | End: 2021-01-14
Payer: COMMERCIAL

## 2021-01-14 DIAGNOSIS — D64.9 ANEMIA, UNSPECIFIED TYPE: ICD-10-CM

## 2021-01-14 DIAGNOSIS — R94.5 LIVER FUNCTION STUDY, ABNORMAL: ICD-10-CM

## 2021-01-14 DIAGNOSIS — E11.9 TYPE 2 DIABETES MELLITUS WITHOUT COMPLICATION, WITHOUT LONG-TERM CURRENT USE OF INSULIN (HCC): ICD-10-CM

## 2021-01-14 DIAGNOSIS — E53.8 VITAMIN B12 DEFICIENCY: ICD-10-CM

## 2021-01-14 DIAGNOSIS — E55.9 VITAMIN D DEFICIENCY: ICD-10-CM

## 2021-01-14 DIAGNOSIS — R71.8 MICROCYTOSIS: ICD-10-CM

## 2021-01-14 DIAGNOSIS — E03.9 HYPOTHYROIDISM, UNSPECIFIED TYPE: ICD-10-CM

## 2021-01-14 DIAGNOSIS — E78.2 MIXED HYPERLIPIDEMIA: ICD-10-CM

## 2021-01-14 DIAGNOSIS — E11.65 TYPE 2 DIABETES MELLITUS WITH HYPERGLYCEMIA, WITHOUT LONG-TERM CURRENT USE OF INSULIN (HCC): ICD-10-CM

## 2021-01-14 LAB
ALBUMIN SERPL-MCNC: 3.6 G/DL (ref 3.5–5.2)
ALP BLD-CCNC: 97 U/L (ref 35–104)
ALT SERPL-CCNC: 68 U/L (ref 0–32)
ANION GAP SERPL CALCULATED.3IONS-SCNC: 9 MMOL/L (ref 7–16)
AST SERPL-CCNC: 70 U/L (ref 0–31)
BASOPHILS ABSOLUTE: 0.03 E9/L (ref 0–0.2)
BASOPHILS RELATIVE PERCENT: 0.4 % (ref 0–2)
BILIRUB SERPL-MCNC: 0.7 MG/DL (ref 0–1.2)
BILIRUBIN URINE: NEGATIVE
BLOOD, URINE: NEGATIVE
BUN BLDV-MCNC: 10 MG/DL (ref 6–20)
CALCIUM SERPL-MCNC: 8.7 MG/DL (ref 8.6–10.2)
CHLORIDE BLD-SCNC: 103 MMOL/L (ref 98–107)
CHOLESTEROL, TOTAL: 140 MG/DL (ref 0–199)
CLARITY: CLEAR
CO2: 23 MMOL/L (ref 22–29)
COLOR: YELLOW
CREAT SERPL-MCNC: 0.5 MG/DL (ref 0.5–1)
CREATININE URINE: 56 MG/DL (ref 29–226)
EOSINOPHILS ABSOLUTE: 0.09 E9/L (ref 0.05–0.5)
EOSINOPHILS RELATIVE PERCENT: 1.1 % (ref 0–6)
FERRITIN: 338 NG/ML
FOLATE: 18.4 NG/ML (ref 4.8–24.2)
GFR AFRICAN AMERICAN: >60
GFR NON-AFRICAN AMERICAN: >60 ML/MIN/1.73
GLUCOSE BLD-MCNC: 238 MG/DL (ref 74–99)
GLUCOSE URINE: >=1000 MG/DL
HBA1C MFR BLD: 10.7 % (ref 4–5.6)
HCT VFR BLD CALC: 38.4 % (ref 34–48)
HDLC SERPL-MCNC: 53 MG/DL
HEMOGLOBIN: 12.3 G/DL (ref 11.5–15.5)
IMMATURE GRANULOCYTES #: 0.03 E9/L
IMMATURE GRANULOCYTES %: 0.4 % (ref 0–5)
KETONES, URINE: NEGATIVE MG/DL
LDL CHOLESTEROL CALCULATED: 62 MG/DL (ref 0–99)
LEUKOCYTE ESTERASE, URINE: NEGATIVE
LYMPHOCYTES ABSOLUTE: 3.2 E9/L (ref 1.5–4)
LYMPHOCYTES RELATIVE PERCENT: 40.5 % (ref 20–42)
MCH RBC QN AUTO: 25.1 PG (ref 26–35)
MCHC RBC AUTO-ENTMCNC: 32 % (ref 32–34.5)
MCV RBC AUTO: 78.2 FL (ref 80–99.9)
MICROALBUMIN UR-MCNC: <12 MG/L
MICROALBUMIN/CREAT UR-RTO: ABNORMAL (ref 0–30)
MONOCYTES ABSOLUTE: 0.57 E9/L (ref 0.1–0.95)
MONOCYTES RELATIVE PERCENT: 7.2 % (ref 2–12)
NEUTROPHILS ABSOLUTE: 3.98 E9/L (ref 1.8–7.3)
NEUTROPHILS RELATIVE PERCENT: 50.4 % (ref 43–80)
NITRITE, URINE: NEGATIVE
PDW BLD-RTO: 15.9 FL (ref 11.5–15)
PH UA: 7.5 (ref 5–9)
PLATELET # BLD: 285 E9/L (ref 130–450)
PMV BLD AUTO: 9.5 FL (ref 7–12)
POTASSIUM SERPL-SCNC: 3.8 MMOL/L (ref 3.5–5)
PROTEIN UA: NEGATIVE MG/DL
RBC # BLD: 4.91 E12/L (ref 3.5–5.5)
SODIUM BLD-SCNC: 135 MMOL/L (ref 132–146)
SPECIFIC GRAVITY UA: 1.01 (ref 1–1.03)
T4 FREE: 1.31 NG/DL (ref 0.93–1.7)
TOTAL CK: 80 U/L (ref 20–180)
TOTAL PROTEIN: 6.9 G/DL (ref 6.4–8.3)
TRIGL SERPL-MCNC: 124 MG/DL (ref 0–149)
TSH SERPL DL<=0.05 MIU/L-ACNC: 1.25 UIU/ML (ref 0.27–4.2)
UROBILINOGEN, URINE: 0.2 E.U./DL
VITAMIN B-12: 1080 PG/ML (ref 211–946)
VITAMIN D 25-HYDROXY: 23 NG/ML (ref 30–100)
VLDLC SERPL CALC-MCNC: 25 MG/DL
WBC # BLD: 7.9 E9/L (ref 4.5–11.5)

## 2021-01-14 PROCEDURE — 84443 ASSAY THYROID STIM HORMONE: CPT

## 2021-01-14 PROCEDURE — 82306 VITAMIN D 25 HYDROXY: CPT

## 2021-01-14 PROCEDURE — 82728 ASSAY OF FERRITIN: CPT

## 2021-01-14 PROCEDURE — 80061 LIPID PANEL: CPT

## 2021-01-14 PROCEDURE — 82746 ASSAY OF FOLIC ACID SERUM: CPT

## 2021-01-14 PROCEDURE — 85025 COMPLETE CBC W/AUTO DIFF WBC: CPT

## 2021-01-14 PROCEDURE — 82570 ASSAY OF URINE CREATININE: CPT

## 2021-01-14 PROCEDURE — 81003 URINALYSIS AUTO W/O SCOPE: CPT

## 2021-01-14 PROCEDURE — 36415 COLL VENOUS BLD VENIPUNCTURE: CPT

## 2021-01-14 PROCEDURE — 82550 ASSAY OF CK (CPK): CPT

## 2021-01-14 PROCEDURE — 82044 UR ALBUMIN SEMIQUANTITATIVE: CPT

## 2021-01-14 PROCEDURE — 82607 VITAMIN B-12: CPT

## 2021-01-14 PROCEDURE — 83036 HEMOGLOBIN GLYCOSYLATED A1C: CPT

## 2021-01-14 PROCEDURE — 80053 COMPREHEN METABOLIC PANEL: CPT

## 2021-01-14 PROCEDURE — 84439 ASSAY OF FREE THYROXINE: CPT

## 2021-01-14 NOTE — RESULT ENCOUNTER NOTE
Sugar significantly elevated , LFTs elevated but stable. Looks like she had recent Covid symptoms.   Make virtual follow-up to review more detail lifestyle modification

## 2021-01-15 NOTE — TELEPHONE ENCOUNTER
Please add this pt to my schedule for DM in  Few weeks .  I saw her in 9/2020 but never scheduled for FU visit

## 2021-01-19 DIAGNOSIS — E11.9 TYPE 2 DIABETES MELLITUS WITHOUT COMPLICATION, WITHOUT LONG-TERM CURRENT USE OF INSULIN (HCC): ICD-10-CM

## 2021-01-19 RX ORDER — GLIPIZIDE 10 MG/1
TABLET, FILM COATED, EXTENDED RELEASE ORAL
Qty: 180 TABLET | Refills: 5 | Status: SHIPPED
Start: 2021-01-19 | End: 2021-04-26 | Stop reason: SDUPTHER

## 2021-01-22 ENCOUNTER — VIRTUAL VISIT (OUTPATIENT)
Dept: PRIMARY CARE CLINIC | Age: 46
End: 2021-01-22
Payer: COMMERCIAL

## 2021-01-22 DIAGNOSIS — R71.8 MICROCYTOSIS: ICD-10-CM

## 2021-01-22 DIAGNOSIS — E78.2 MIXED HYPERLIPIDEMIA: ICD-10-CM

## 2021-01-22 DIAGNOSIS — G47.33 OSA (OBSTRUCTIVE SLEEP APNEA): ICD-10-CM

## 2021-01-22 DIAGNOSIS — F32.A ANXIETY AND DEPRESSION: ICD-10-CM

## 2021-01-22 DIAGNOSIS — D48.62 NEOPLASM OF UNCERTAIN BEHAVIOR OF BREAST, LEFT: Primary | ICD-10-CM

## 2021-01-22 DIAGNOSIS — E11.65 TYPE 2 DIABETES MELLITUS WITH HYPERGLYCEMIA, WITHOUT LONG-TERM CURRENT USE OF INSULIN (HCC): ICD-10-CM

## 2021-01-22 DIAGNOSIS — E04.1 THYROID NODULE: ICD-10-CM

## 2021-01-22 DIAGNOSIS — F41.9 ANXIETY AND DEPRESSION: ICD-10-CM

## 2021-01-22 DIAGNOSIS — E03.9 HYPOTHYROIDISM, UNSPECIFIED TYPE: ICD-10-CM

## 2021-01-22 DIAGNOSIS — Z00.00 HEALTH MAINTENANCE EXAMINATION: ICD-10-CM

## 2021-01-22 DIAGNOSIS — R94.5 LIVER FUNCTION STUDY, ABNORMAL: ICD-10-CM

## 2021-01-22 DIAGNOSIS — E53.8 VITAMIN B12 DEFICIENCY: ICD-10-CM

## 2021-01-22 DIAGNOSIS — E55.9 VITAMIN D DEFICIENCY: ICD-10-CM

## 2021-01-22 PROCEDURE — 99214 OFFICE O/P EST MOD 30 MIN: CPT | Performed by: FAMILY MEDICINE

## 2021-01-22 SDOH — ECONOMIC STABILITY: FOOD INSECURITY: WITHIN THE PAST 12 MONTHS, THE FOOD YOU BOUGHT JUST DIDN'T LAST AND YOU DIDN'T HAVE MONEY TO GET MORE.: PATIENT DECLINED

## 2021-01-22 SDOH — ECONOMIC STABILITY: TRANSPORTATION INSECURITY
IN THE PAST 12 MONTHS, HAS THE LACK OF TRANSPORTATION KEPT YOU FROM MEDICAL APPOINTMENTS OR FROM GETTING MEDICATIONS?: PATIENT DECLINED

## 2021-01-22 SDOH — ECONOMIC STABILITY: INCOME INSECURITY: HOW HARD IS IT FOR YOU TO PAY FOR THE VERY BASICS LIKE FOOD, HOUSING, MEDICAL CARE, AND HEATING?: PATIENT DECLINED

## 2021-01-22 NOTE — PROGRESS NOTES
TeleMedicine Patient Consent    This visit was performed as a virtual video visit using a synchronous, two-way, audio-video telehealth technology platform. Patient identification was verified at the start of the visit, including the patient's telephone number and physical location. I discussed with the patient the nature of our telehealth visits, that:     1. Due to the nature of an audio- video modality, the only components of a physical exam that could be done are the elements supported by direct observation. 2. I would evaluate the patient and recommend diagnostics and treatments based on my assessment. 3. If it was felt that the patient should be evaluated in clinic or an emergency room setting, then they would be directed there. 4. Our sessions are not being recorded and that personal health information is protected. 5. Our team would provide follow up care in person if/when the patient needs it. Patient does agree to proceed with telemedicine consultation. Patient's location: home address in [facility]    Physician  location [place] address in [place]     Other people involved in call:   None    This visit was completed virtually using Doxy. me    2021    TELEHEALTH EVALUATION -- Audio/Visual (During NZEBE-97 public health emergency)    Chief Complaint   Patient presents with    Discuss Labs           HPI:    Kelvin Chavarria (:  1975) has requested an audio/video evaluation for the following concern(s):    Patient presents today for follow-up on blood work. Also breast imaging. Nodule resolved. They told her at the imaging facility that it was likely a cyst.  Ultrasound mammogram negative. However if persistent symptoms still needs further evaluation and reviewed this with her but she does not feel any abnormality. Endocrinology started her on Ozempic. It is making her very nauseated with GI upset. She cannot tolerate. She stopped it. Sugars are quite uncontrolled. Various options reviewed. She will discuss and follow-up closely with endocrinology. Risk of uncontrolled diabetes reviewed. Otherwise she feels well and her other numbers are stable    She noticed her vitamin D was low and she increased on her own her vitamin D from 4000 IUs daily to 6000 IUs daily, I recommended 5000 IUs daily at this time.     Blood work reviewed,  CMP normal except glucose 238 HDL 53 LDL 62 triglyceride 124 LFTs elevated but stable at 68/70 ALT/AST, hemoglobin A1c extremely elevated at 10.7, TFTs normal vitamin D 23 CBC grossly normal chronic microcytosis 78.2 differential reviewed vitamin B12 1080 and counseled on backing down, ferritin 038 folic acid 12.3 urinalysis shows glucose otherwise negative and microalbumin creatinine ratioNC  Most Recent Labs  CBC  Lab Results   Component Value Date    WBC 7.9 01/14/2021    WBC 12.8 10/02/2020    WBC 8.8 05/28/2020    RBC 4.91 01/14/2021    RBC 5.49 10/02/2020    RBC 5.30 05/28/2020    HGB 12.3 01/14/2021    HGB 13.8 10/02/2020    HGB 13.3 05/28/2020    HCT 38.4 01/14/2021    HCT 43.1 10/02/2020    HCT 41.6 05/28/2020    MCV 78.2 01/14/2021    MCV 78.5 10/02/2020    MCV 78.5 05/28/2020     01/14/2021     10/02/2020     05/28/2020      CMP  Lab Results   Component Value Date     01/14/2021     10/02/2020     07/17/2020    K 3.8 01/14/2021    K 3.3 10/02/2020    K 3.8 07/17/2020    K 3.9 05/28/2020    K 4.2 10/17/2019     01/14/2021     10/02/2020     07/17/2020    CO2 23 01/14/2021    CO2 24 10/02/2020    CO2 24 07/17/2020    ANIONGAP 9 01/14/2021    ANIONGAP 9 10/02/2020    ANIONGAP 12 07/17/2020    GLUCOSE 238 01/14/2021    GLUCOSE 105 10/02/2020    GLUCOSE 219 07/17/2020    BUN 10 01/14/2021    BUN 7 10/02/2020    BUN 11 07/17/2020 CREATININE 0.5 01/14/2021    CREATININE 0.7 10/02/2020    CREATININE 0.5 07/17/2020    LABGLOM >60 01/14/2021    LABGLOM >60 10/02/2020    LABGLOM >60 07/17/2020    GFRAA >60 01/14/2021    GFRAA >60 10/02/2020    GFRAA >60 07/17/2020    CALCIUM 8.7 01/14/2021    CALCIUM 9.6 10/02/2020    CALCIUM 8.8 07/17/2020    PROT 6.9 01/14/2021    PROT 7.6 10/02/2020    PROT 7.2 07/17/2020    LABALBU 3.6 01/14/2021    LABALBU 4.0 10/02/2020    LABALBU 4.0 07/17/2020    BILITOT 0.7 01/14/2021    BILITOT 0.7 10/02/2020    BILITOT 1.1 07/17/2020    ALKPHOS 97 01/14/2021    ALKPHOS 105 10/02/2020    ALKPHOS 82 07/17/2020    AST 70 01/14/2021    AST 46 10/02/2020    AST 41 07/17/2020    ALT 68 01/14/2021    ALT 64 10/02/2020    ALT 51 07/17/2020     A1C  Lab Results   Component Value Date    LABA1C 10.7 01/14/2021    LABA1C 9.3 09/01/2020    LABA1C 8.9 05/28/2020     TSH  Lab Results   Component Value Date    TSH 1.250 01/14/2021    TSH 2.450 05/28/2020    TSH 0.72 02/22/2020     FREET4  No results found for: E3HHVPR  LIPID  Lab Results   Component Value Date    CHOL 140 01/14/2021    CHOL 181 07/17/2020    CHOL 223 05/28/2020    HDL 53 01/14/2021    HDL 58 07/17/2020    HDL 55 05/28/2020    LDLCALC 62 01/14/2021    LDLCALC 93 07/17/2020    LDLCALC - 05/28/2020    TRIG 124 01/14/2021    TRIG 150 07/17/2020    TRIG 418 05/28/2020    CHOLHDLRATIO 3.9 02/22/2020     VITAMIN D  Lab Results   Component Value Date    VITD25 23 01/14/2021     MAGNESIUM  Lab Results   Component Value Date    MG 1.7 10/02/2020      PHOS  No results found for: PHOS   DIANELYS   Lab Results   Component Value Date    DIANELYS POSITIVE 07/17/2020     RHEUMATOID FACTOR  Lab Results   Component Value Date    RF <10 07/17/2020     PSA  No results found for: PSA   HEPATITIS C  No results found for: HCVABI  HIV  No results found for: BCX0FLK, HIV1QT  UA  Lab Results   Component Value Date    COLORU Yellow 01/14/2021    COLORU DARK YELLOW 10/02/2020 COLORU Yellow 02/22/2020    CLARITYU Clear 01/14/2021    CLARITYU CLOUDY 10/02/2020    CLARITYU Turbid 02/22/2020    GLUCOSEU >=1000 01/14/2021    GLUCOSEU Negative 10/02/2020    GLUCOSEU 3+ 02/22/2020    BILIRUBINUR Negative 01/14/2021    BILIRUBINUR Negative 10/02/2020    BILIRUBINUR Negative 02/22/2020    BILIRUBINUR Negative 11/13/2019    BILIRUBINUR Negative 10/17/2019    KETUA Negative 01/14/2021    KETUA TRACE 10/02/2020    KETUA  02/22/2020      Comment:      trace    SPECGRAV 1.010 01/14/2021    SPECGRAV >=1.030 10/02/2020    SPECGRAV 1.015 11/13/2019    SPECGRAV >=1.030 10/17/2019    BLOODU Negative 01/14/2021    BLOODU Negative 10/02/2020    BLOODU Negative 02/22/2020    PHUR 7.5 01/14/2021    PHUR 6.5 10/02/2020    PHUR  02/22/2020      Comment:      <5.0    PROTEINU Negative 01/14/2021    PROTEINU TRACE 10/02/2020    PROTEINU Negative 02/22/2020    UROBILINOGEN 0.2 01/14/2021    UROBILINOGEN 0.2 10/02/2020    UROBILINOGEN 0.2 10/17/2019    NITRU Negative 01/14/2021    NITRU Negative 10/02/2020    NITRU Negative 02/22/2020    LEUKOCYTESUR Negative 01/14/2021    LEUKOCYTESUR Negative 10/02/2020    LEUKOCYTESUR Negative 02/22/2020     Urine Micro/Albumin Ratio  Lab Results   Component Value Date    MALBCR - 01/14/2021    MALBCR 118 02/22/2020    MALBCR 11.6 10/17/2019           ROS:  Const: Denies chills, fever, malaise and sweats. Eyes: Denies discharge, pain, redness and visual disturbance. ENMT: Denies earaches, other ear symptoms. Denies nasal or sinus symptoms other than stated  above. Denies mouth and tongue lesions and sore throat. CV: Denies chest discomfort, pain; diaphoresis, dizziness, edema, lightheadedness, orthopnea,  palpitations, syncope and near syncopal episode or any exertional symptoms  Resp: Denies cough, hemoptysis, pleuritic pain, SOB, sputum production and wheezing. GI: Denies abdominal pain, change in bowel habits, hematochezia, melena, nausea and vomiting. : Denies urinary symptoms including dysuria , urgency, frequency or hematuria. Musculo: Chronic fibromyalgia-like symptoms, stable   skin: Denies bruising and rash.   Neuro: Denies headache, numbness, stiff neck, tingling and focal weakness slurred speech or facial  droop  Hema/Lymph: Denies bleeding/bruising tendency and enlarged lymph nodes         Current Outpatient Medications:     glipiZIDE (GLUCOTROL XL) 10 MG extended release tablet, TAKE 1 TABLET BY MOUTH TWICE A DAY, Disp: 180 tablet, Rfl: 5    chlorpheniramine (ALLER-CHLOR) 4 MG tablet, Take 1 tablet by mouth every 6 hours as needed for Allergies, Disp: 20 tablet, Rfl: 0    cetirizine (ZYRTEC) 10 MG tablet, Take 1 tablet by mouth daily, Disp: 30 tablet, Rfl: 1    buPROPion (WELLBUTRIN SR) 150 MG extended release tablet, Take 1 tablet by mouth every morning, Disp: 90 tablet, Rfl: 3    levothyroxine (SYNTHROID) 75 MCG tablet, Take 1 tablet by mouth Daily, Disp: 90 tablet, Rfl: 3    rosuvastatin (CRESTOR) 10 MG tablet, Take 1 tablet by mouth daily, Disp: 30 tablet, Rfl: 3    Blood Glucose Monitoring Suppl MISC, Blood glucose meter - use as directed, Disp: 1 each, Rfl: 1    blood glucose monitor strips, Use to check blood sugar 3 times/day, Disp: 100 strip, Rfl: 5    Lancets MISC, Use to test blood sugar 3 times a day, Disp: 100 each, Rfl: 5    aspirin 81 MG EC tablet, Take 81 mg by mouth daily, Disp: , Rfl:     B Complex-C (SUPER B COMPLEX/VITAMIN C PO), Take by mouth daily, Disp: , Rfl:     loratadine (CLARITIN) 10 MG tablet, Take 10 mg by mouth daily, Disp: , Rfl:     metFORMIN (GLUCOPHAGE-XR) 500 MG extended release tablet, Take 2 tablets by mouth 2 times daily, Disp: 360 tablet, Rfl: 3    omeprazole (PRILOSEC) 20 MG delayed release capsule, Take 1 capsule by mouth daily, Disp: 90 capsule, Rfl: 3    Cholecalciferol (VITAMIN D3) 2000 units CAPS, Take 2,000 Units by mouth daily , Disp: , Rfl:   Allergies   Allergen Reactions  Celecoxib Hives       Past Medical History:   Diagnosis Date    Depression     Diabetes mellitus (HCC)     GERD (gastroesophageal reflux disease)     Joint pain     Thyroid disease      Past Surgical History:   Procedure Laterality Date     SECTION      CHOLECYSTECTOMY      HYSTERECTOMY       No family history on file. Social History     Tobacco Use    Smoking status: Never Smoker    Smokeless tobacco: Never Used   Substance Use Topics    Alcohol use: No    Drug use: No     Social History     Social History Narrative    PMH:    Problem List: Eruption, Infestation by Sarcoptes scabiei sherly hominis, Dysthymic disorder, Peptic reflux    disease, Dysthymia, Hypothyroidism, Adult health examination        Health Maintenance:    Mammogram - (10/1/2018)    Mammogram Screening - (10/1/2018)    Influenza Vaccination - (2015)        Medical Problems:    vitamin D def, Asthma    Hypothyroidism - partial thyroidectomy (half of left lobe); benign nodule    Anxiety    GERD - EGD     Seasonal Allergies, Type 2 Diabetes        Surgical Hx:    C/S    Partial Hysterectomy - left ovary remains (bleeding issues)    Benita        FH:    Father:    . (Hx)    Mother:    . (Hx)    Mom - thyroid cancer, melanoma    Dad - depression    cousin - melanoma        SH:    . (Marital)Stay at home;    3 kids (8,7,5 as of ). No complications during pregnancy. Jacquie Leon ; works at [employer]    Personal Habits: Cigarette Use: Nonsmoker. Alcohol: Denies alcohol use. PHYSICAL EXAMINATION:  [ INSTRUCTIONS:  \"[x]\" Indicates a positive item  \"[]\" Indicates a negative item  -- DELETE ALL ITEMS NOT EXAMINED]  Vital Signs: (As obtained by patient/caregiver or practitioner observation)    There were no vitals filed for this visit.       Blood pressure-  Heart rate-    Respiratory rate-    Temperature-  Pulse oximetry-     Constitutional: [x] Appears well-developed and well-nourished [x] No apparent distress [] Abnormal-   Mental status  [x] Alert and awake  [x] Oriented to person/place/time [x]Able to follow commands      Eyes:  EOM    [x]  Normal  [] Abnormal-  Sclera  [x]  Normal  [] Abnormal -         Discharge [x]  None visible  [] Abnormal -    HENT:   [x] Normocephalic, atraumatic. [] Abnormal   [x] Mouth/Throat: Mucous membranes are moist.     External Ears [x] Normal  [] Abnormal-     Neck: [x] No visualized mass     Pulmonary/Chest: [x] Respiratory effort normal.  [x] No visualized signs of difficulty breathing or respiratory distress        [] Abnormal-      Musculoskeletal:   [x] Normal gait with no signs of ataxia         [x] Normal range of motion of neck        [] Abnormal-       Neurological:        [x] No Facial Asymmetry (Cranial nerve 7 motor function) (limited exam to video visit)          [x] No gaze palsy        [] Abnormal-         Skin:        [x] No significant exanthematous lesions or discoloration noted on facial skin         [] Abnormal-            Psychiatric:       [x] Normal Affect [x] No Hallucinations        [] Abnormal-     Other pertinent observable physical exam findings-     ASSESSMENT/PLAN:   Diagnosis Orders   1. Neoplasm of uncertain behavior of breast, left     2. Anxiety and depression     3. Microcytosis  CBC Auto Differential   4. Type 2 diabetes mellitus with hyperglycemia, without long-term current use of insulin (HCC)  Comprehensive Metabolic Panel    Urinalysis    Microalbumin / Creatinine Urine Ratio    Hemoglobin A1C   5. Liver function study, abnormal  Comprehensive Metabolic Panel   6. Hypothyroidism, unspecified type  TSH without Reflex    T4, Free   7. Vitamin D deficiency  Vitamin D 25 Hydroxy   8. Thyroid nodule     9. LEXIS (obstructive sleep apnea)     10. Mixed hyperlipidemia  CK    Lipid Panel   11. Vitamin B12 deficiency  Vitamin B12 & Folate   12. Health maintenance examination         No problem-specific Assessment & Plan notes found for this encounter. Anxiety and depression  Was doing well sertraline but because of the chronic pain we changed to Cymbalta   which is helping her with pain, was doing well psychologically but then was getting breakthrough depression. She wanted to continue Cymbalta as it was helping with pain. We did discuss option of going back to SSRI, switching to a med such as  Pristiq with more dosing options. However did well with Wellbutrin the past, this was added Wellbutrin  mg every morning at 9/17/2020 visit, doing well.if underlying anxiety we discussed BuSpar but denies significant symptoms at this time. She was refered to [facility] counseling for evaluation and treatment as well, had intake and had an appointment October 22. .  Adamantly denies SI/HI. Overall doing very well now. Type 2 diabetes mellitus with hyperglycemia, without long-term current use of insulin [facility])  Endocrinology started Ozempic, significant GI upset and so stopped it. Tolerating other medications. ,  Sugars are very uncontrolled. Risk of uncontrolled diabetes reviewed. She will contact endocrinology soon. Counseled on different medication options including insulin. On metformin with precautions reviewed including lactic  acidosis diarrhea, recall reviewed.  On Glucotrol with precautions including hypoglycemia. Watch ambulatory. If out  of range, let us know. Stressed importance of daily foot examinations, regular eye  examinations etc. Micro-and macrovascular complications reviewed, hyper or  hypoglycemic precautions reviewed . cont lifestyle.       hemoglobin A1c goals reviewed.     Did not tolerate Jardiance because of recurrent yeast infections. Continue per Dr. Santos Left    Will consider ACE inhibitor but defers at this time.     Liver function study, abnormal  Counseled extensively. Differential reviewed, including serious etiologies. h/o fatty  liver. Precautions reviewed.  Lifestyle modification appropriate diet and weight loss reviewed. Risks of even this leading to cirrhosis reviewed. Other than basic  monitoring on interested in other evaluation or treatment, in-depth blood work,  imaging or otherwise. Maribell Coy LFTs fluctuate. HEP B and C neg (she started hepatitis A and B series 2/25/2020, will need hepatitis A #2 and hepatitis B #3. LFTs were elevated prior to statin use.         Inflammatory polyarthropathy (HCC)  Chronic joint pain. Failed mtx. did not tolerated. gi intolerance. bw neg. recommend cont per dr dick/rheumatology but declines follow-up now. Relatively stable on Cymbalta although slightly increased recently. Consider Lyrica. Risk benefits reviewed. DIANELYS still elevated, 1-1 60-, was supposed to see Dr. Andra Hawk in [place]           Thyroid nodule  Counseled extensively. Differential reviewed, including serious etiologies. History of  thyroid nodule resected with \"various cells\" but ultimately deemed to be benign. She  had a thyroid ultrasound 10/18 showing \"two benign cysts\" and 2 lymph nodes \"not  overtly suspicious.    Follows with Dr. Paul Arevalo, the monitor ultrasoundsHypothyroidism  With history of partial left thyroidectomy. montior blood work. On Synthroid.    Follows with Dr. Cheek/Vinh.   Thyroid ultrasound 12/15 showed thyroid goiter , 3mm nodule without  suspicious features and partial left lobectomy . , repeat 10/18 .  TFTs stable. She states she has had a recent ultrasound and saw endocrinology couple weeks ago.     Health maintenance examination  Health maintenance issues discussed at length 9/18. Encouraged yearly physicals.  Importance and what this entails reviewed. Due for hepatitis #3      Vitamin D deficiency  Slightly low 23 on 4000 IUs daily, when she saw the result she increased to 6000 IUs daily over the past week I recommend 5000 IUs daily.          LEXIS (obstructive sleep apnea)  Doing very well with CPAP.  Uses 6-7 hours per night 7 days a week with great  benefit.        Anemia Chronically Low MCV, monitor. Iron and hemoglobin electrophoresis have been  normal.  MCV fluctuates. Counseled extensively. Differential reviewed, including serious etiologies. Ferritin normal.  Asymptomatic. Monitor.     Calculus, kidney, left  CAT scan showing nonobstructing kidney stone left, 1/16. Declines other evaluation  treatment now. Uric acid 3/16 negative. Proper hydration low oxalate diet. Declines  urology referral at this time. asx. CT 10/20 showed nonobstructive left kidney stone as well.        Mixed hyperlipidemia  Goals reviewed, excellent control.  She is on Crestor, tolerating. Risk benefits ADRs reviewed.  Lifestyle modification reviewed. risk of  hyperlipidemia reviewed monitor  Counseled extensively.        Gastroesophageal reflux disease without esophagitis  Asymptomatic as long as taking therapy.    Breakthrough off medication.  She is tolerating omeprazole 20 mg daily, previously was on 40 mg daily.  Declines further testing and understands  risk of masking underlying etiologies. Risks of meds also reviewed incl RI/Electrolye  malabsorption.  EGD 2011. Showed gerd     Vitamin B12 deficiency  History of.  Appropriate supplementation reviewed.  Risk of high and low reviewed.  Monitor. She is high, she can back down. Diarrhea  Counseled extensively. Differential reviewed, including serious etiologies. In context of nausea and vomiting, possible self-limited gastroenteritis, resolved. COVID test was negative at independent pharmacy    Leukocytosis  Counseled extensively. Differential reviewed, including serious etiologies. Noted in ER. Recheck normalized. Symptoms resolved    Lactose intolerance  Counseled. Recommend lactose Free diet versus Lactaid    Hypokalemia  Counseled. Supplemented in ER. Recheck normalized. Counseled extensively and differential diagnoses of above were reviewed, including serious etiologies. Side effects and interactions of medications were reviewed. Plan as above:  Counseled. Contact endocrinologist ASAP to work on sugar/diabetes control. Risk of uncontrolled diabetes reviewed. Increase vitamin D3 to 5000 IUs daily from 4000. Otherwise continue current therapy, blood work and follow-up in 3 months sooner as needed      As long as symptoms steadily improve/resolve and medical conditions are following the expected course, FU as below, sooner PRN      Return in about 3 months (around 4/22/2021), or if symptoms worsen or fail to improve. Time spent: Greater than Not billed by time      Jonathan Phoenix is a 41 y.o. female being evaluated by a Virtual Visit (video visit) encounter to address concerns as mentioned above. A caregiver was present when appropriate. Due to this being a TeleHealth encounter (During PYRYE-04 public health emergency), evaluation of the following organ systems was limited: Vitals/Constitutional/EENT/Resp/CV/GI//MS/Neuro/Skin/Heme-Lymph-Imm. Pursuant to the emergency declaration under the [facility], [address] [place] [address] authority and the [employer] Act, this Virtual Visit was conducted with patient's (and/or legal guardian's) consent, to reduce the patient's risk of exposure to COVID-19 and provide necessary medical care. The patient (and/or legal guardian) has also been advised to contact this office for worsening conditions or problems, and seek emergency medical treatment and/or call 911 if deemed necessary. Services were provided through a video synchronous discussion virtually to substitute for in-person clinic visit. Various options to be seen in person were discussed. Patients are advised to check with insurance company to ensure coverage and to fully understand benefits and cost prior to any testing to try to avoid unexpected charges. This note was created with the assistance of voice recognition software. Inadvertent errors may be present. Signs and symptoms to watch for were discussed. Serious signs and symptoms reviewed. ER if any    --Tiffanie Fung MD on 1/22/2021 at 11:19 AM    An electronic signature was used to authenticate this note.

## 2021-02-01 ENCOUNTER — VIRTUAL VISIT (OUTPATIENT)
Dept: ENDOCRINOLOGY | Age: 46
End: 2021-02-01
Payer: COMMERCIAL

## 2021-02-01 DIAGNOSIS — E55.9 VITAMIN D DEFICIENCY: ICD-10-CM

## 2021-02-01 DIAGNOSIS — E04.2 MULTINODULAR GOITER: ICD-10-CM

## 2021-02-01 DIAGNOSIS — E11.9 TYPE 2 DIABETES MELLITUS WITHOUT COMPLICATION, WITHOUT LONG-TERM CURRENT USE OF INSULIN (HCC): Primary | ICD-10-CM

## 2021-02-01 PROCEDURE — 99214 OFFICE O/P EST MOD 30 MIN: CPT | Performed by: INTERNAL MEDICINE

## 2021-02-01 RX ORDER — INSULIN GLARGINE 100 [IU]/ML
15 INJECTION, SOLUTION SUBCUTANEOUS NIGHTLY
Qty: 5 PEN | Refills: 3 | Status: SHIPPED
Start: 2021-02-01 | End: 2021-02-02 | Stop reason: CLARIF

## 2021-02-01 RX ORDER — PEN NEEDLE, DIABETIC 32 GX 1/4"
NEEDLE, DISPOSABLE MISCELLANEOUS
Qty: 150 EACH | Refills: 5 | Status: SHIPPED
Start: 2021-02-01 | End: 2021-04-26 | Stop reason: SDUPTHER

## 2021-02-01 NOTE — PROGRESS NOTES
Kelvin Chavarria was read the following message We want to confirm that, for purposes of billing, this is a virtual visit with your provider for which we will submit a claim for reimbursement with your insurance company. You will be responsible for any copays, coinsurance amounts or other amounts not covered by your insurance company. If you do not accept this, unfortunately we will not be able to schedule or proceed with a virtual visit with the provider. Do you accept? Sarahy Hernandez responded Yes .

## 2021-02-01 NOTE — PROGRESS NOTES
[address] [place] [address] Department of Endocrinology Diabetes and Metabolism   [address] [facility] [address]   Phone: [phone]  Fax: [phone]    Date of Service: 2/1/2021    Primary Care Physician: Leobardo Horton MD  Provider: Aniya Gonzalez MD     Reason for the visit:  DM type 2, Hypothyroidism, MNG     History of Present Illness: The history is provided by the patient. No  was used. Accuracy of the patient data is excellent. Sundeep Fallon is a very pleasant 41 y.o. female seen today for diabetes management     Sundeep Fallon was diagnosed with diabetes at age 46 and currently on Metformin 1000 mg BID, Glipizide ER 10 mg BID   The patient reported poor compliance with diet recently. She also has been checking blood sugar only 2-3 times/wk. Readings usually around 220  Most recent A1c results summarized below  Lab Results   Component Value Date    LABA1C 10.7 01/14/2021    LABA1C 9.3 09/01/2020    LABA1C 8.9 05/28/2020     Patient has had no hypoglycemic episodes   The patient hasn't been mindful of what has been eating and wasn't following diabetes diet as encouraged   I reviewed current medications and the patient has no issues with diabetes medications  Sundeep Fallon is up to date with eye exam and denied any history of diabetic retinopathy   The patient performs  own feet care  Microvascular complications:  No Retinopathy, Nephropathy or Neuropathy   Macrovascular complications: no CAD, PVD, or Stroke  The patient refuses Flushot and pneumonia vaccine     Hypothyroidism   H/o partial thyroidectomy long time ago for MNG. Per pt pathology was benign   She is currently on Levothyhroxine 75 mcg dailty. Patient takes levothyroxine in the morning at empty stomach, wait one hour before eating , avoid multivitamins containing calcium  or iron with it.   Lab Results   Component Value Date/Time    TSH 1.250 01/14/2021 11:20 AM    T4FREE 1.31 01/14/2021 11:20 AM Thyroid nodule   Thyroid US 10/2018: Left lobe surgically absent  Rt lobe measures 5.4 cm --> 3 mm cyst     Elbert Olivares denies any new lumps, bumps in the neck, voice change, or shortness of breath. No family history of thyroid cancer. No prior history of radiation to head or neck region. PAST MEDICAL HISTORY   Past Medical History:   Diagnosis Date    Depression     Diabetes mellitus ([address]     GERD (gastroesophageal reflux disease)     Joint pain     Thyroid disease        PAST SURGICAL HISTORY   Past Surgical History:   Procedure Laterality Date     SECTION      CHOLECYSTECTOMY      HYSTERECTOMY         SOCIAL HISTORY   Tobacco:   reports that she has never smoked. She has never used smokeless tobacco.  Alcohol:   reports no history of alcohol use. Drugs:   reports no history of drug use. FAMILY HISTORY   No family history on file.     ALLERGIES AND DRUG REACTIONS   Allergies   Allergen Reactions    Celecoxib Hives       CURRENT MEDICATIONS   Current Outpatient Medications   Medication Sig Dispense Refill    glipiZIDE (GLUCOTROL XL) 10 MG extended release tablet TAKE 1 TABLET BY MOUTH TWICE A  tablet 5    chlorpheniramine (ALLER-CHLOR) 4 MG tablet Take 1 tablet by mouth every 6 hours as needed for Allergies 20 tablet 0    cetirizine (ZYRTEC) 10 MG tablet Take 1 tablet by mouth daily 30 tablet 1    buPROPion (WELLBUTRIN SR) 150 MG extended release tablet Take 1 tablet by mouth every morning 90 tablet 3    levothyroxine (SYNTHROID) 75 MCG tablet Take 1 tablet by mouth Daily 90 tablet 3    rosuvastatin (CRESTOR) 10 MG tablet Take 1 tablet by mouth daily 30 tablet 3    Blood Glucose Monitoring Suppl MISC Blood glucose meter - use as directed 1 each 1    blood glucose monitor strips Use to check blood sugar 3 times/day 100 strip 5    Lancets MISC Use to test blood sugar 3 times a day 100 each 5    aspirin 81 MG EC tablet Take 81 mg by mouth daily      B Complex-C (SUPER B COMPLEX/VITAMIN C PO) Take by mouth daily      loratadine (CLARITIN) 10 MG tablet Take 10 mg by mouth daily      metFORMIN (GLUCOPHAGE-XR) 500 MG extended release tablet Take 2 tablets by mouth 2 times daily 360 tablet 3    omeprazole (PRILOSEC) 20 MG delayed release capsule Take 1 capsule by mouth daily 90 capsule 3    Cholecalciferol (VITAMIN D3) 2000 units CAPS Take 2,000 Units by mouth daily        No current facility-administered medications for this visit. Review of Systems  Constitutional: No fever, no chills, no diaphoresis, no generalized weakness. HEENT: No blurred vision, No sore throat, no ear pain, no hair loss  Neck: denied any neck swelling, difficulty swallowing,   Cardio-pulmonary: No CP, SOB or palpitation, No orthopnea or PND. No cough or wheezing. GI: No N/V/D, no constipation, No abdominal pain, no melena or hematochezia   : Denied any dysuria, hematuria, flank pain, discharge, or incontinence. Skin: denied any rash, ulcer, Hirsute, or hyperpigmentation. MSK: denied any joint deformity, joint pain/swelling, muscle pain, or back pain. Neuro: no numbness, no tingling, no weakness, _    OBJECTIVE    There were no vitals taken for this visit. BP Readings from Last 4 Encounters:   12/08/20 124/80   11/30/20 128/72   11/19/20 124/68   10/03/20 115/71     Wt Readings from Last 6 Encounters:   12/08/20 250 lb (113.4 kg)   11/30/20 254 lb (115.2 kg)   11/19/20 256 lb (116.1 kg)   10/02/20 250 lb (113.4 kg)   09/01/20 254 lb 9.6 oz (115.5 kg)   07/28/20 251 lb (113.9 kg)     Physical examination:  Due to this being a TeleHealth encounter, evaluation of the following organ systems is limited: Vitals/Constitutional/EENT/Resp/CV/GI//MS/Neuro/Skin/Heme-Lymph-Imm. Modified physical exam through Telemedicine camera    General: Communicating well via camera   Neck: no obvious neck mass. No obvious neck deformity     CVS: no distress   Chest: no distress.  Chest is moving with respiration Extremities:  no visible tremor  Skin: No visible rashes as seen from camera   Musculoskeletal: no visible deformity  Neuro: Alert and oriented to person, place, and time. Psychiatric: Normal mood and affect. Behavior is normal    Review of Laboratory Data:  I personally reviewed the following lab:  Lab Results   Component Value Date/Time    WBC 7.9 01/14/2021 11:20 AM    RBC 4.91 01/14/2021 11:20 AM    HGB 12.3 01/14/2021 11:20 AM    HCT 38.4 01/14/2021 11:20 AM    MCV 78.2 (L) 01/14/2021 11:20 AM    MCH 25.1 (L) 01/14/2021 11:20 AM    MCHC 32.0 01/14/2021 11:20 AM    RDW 15.9 (H) 01/14/2021 11:20 AM     01/14/2021 11:20 AM    MPV 9.5 01/14/2021 11:20 AM      Lab Results   Component Value Date/Time     01/14/2021 11:20 AM    K 3.8 01/14/2021 11:20 AM    K 3.3 (L) 10/02/2020 09:50 PM    CO2 23 01/14/2021 11:20 AM    BUN 10 01/14/2021 11:20 AM    CREATININE 0.5 01/14/2021 11:20 AM    CALCIUM 8.7 01/14/2021 11:20 AM    LABGLOM >60 01/14/2021 11:20 AM    GFRAA >60 01/14/2021 11:20 AM      Lab Results   Component Value Date/Time    TSH 1.250 01/14/2021 11:20 AM    T4FREE 1.31 01/14/2021 11:20 AM     Lab Results   Component Value Date    LABA1C 10.7 01/14/2021    GLUCOSE 238 01/14/2021    MALBCR - 01/14/2021    LABMICR <12.0 01/14/2021    LABCREA 56 01/14/2021     Lab Results   Component Value Date    LABA1C 10.7 01/14/2021    LABA1C 9.3 09/01/2020    LABA1C 8.9 05/28/2020     Lab Results   Component Value Date    TRIG 124 01/14/2021    HDL 53 01/14/2021    LDLCALC 62 01/14/2021    CHOL 140 01/14/2021     Lab Results   Component Value Date    VITD25 23 01/14/2021     ASSESSMENT & RECOMMENDATIONS   Elbert Olivares, a 41 y.o.-old female seen in for the following issues     Diabetes Mellitus Type 2     · Worsening control due to poor compliance with DM diet and medications.  She also has been checking blood sugar only 2-3 times/wk  · Continue Metformin 1000 mg BID,Glipizide Er 10 mg BID  · Start Lanrus 15 units daily at night   · Wasn't ion to tolerate GLP-1 agonist, DPP-4 inhibitors in the past   · The patient was advised to check blood sugars 2-3  times a day before meals and at bedtime and send BS readings to our office in a week. · Discussed with patient A1c and blood sugar goals   · Patient will need routine diabetes maintenance and prevention  · Diabetes labs before next visit     Dietary noncompliance   Discussed with patient the importance of eating consistent carbohydrate meals, avoiding high glycemic index food. Also, discussed with patient the risk and negative consequences of dietary noncompliance on blood glucose control, blood pressure and weight    Hypothyroidism   · At goal, Continue levothyroxine 75 mcg daily    MNG   · S/p partial Left lobectomy long time ago  · US 7/2020 - small Rt side nodule measuring 9 mm, no suspicious features   · Will follow with intermittent US     I personally reviewed external notes from PCP and other patient's care team providers, and personally interpreted labs associated with the above diagnosis. I also ordered labs to further assess and manage the above addressed medical conditions    Return in about 6 weeks (around 3/15/2021). The above issues were reviewed with the patient who understood and agreed with the plan. Thank you for allowing us to participate in the care of this patient. Please do not hesitate to contact us with any additional questions. Diagnosis Orders   1. Type 2 diabetes mellitus without complication, without long-term current use of insulin (HCC)  Insulin Pen Needle (BD PEN NEEDLE MICRO U/F) 32G X 6 MM MISC    Basic Metabolic Panel    Hemoglobin A1C    DISCONTINUED: insulin glargine (LANTUS SOLOSTAR) 100 UNIT/ML injection pen   2. Multinodular goiter     3.  Vitamin D deficiency       Karmen Costello MD  Endocrinologist, [facility] and Endocrinology   [facility] [place] [address]   Phone: [phone]  Fax: [phone] --------------------------------------------  An electronic signature was used to authenticate this note. Jj Jessica MD on 2/1/2021 at 1:50 PM    This visit was performed as a virtual video visit using a synchronous, two-way, audio-video telehealth technology platform  This Virtual  Visit was conducted, with patient's consent, to reduce the patient's risk of exposure to COVID-19 and provide continuity of care.

## 2021-02-02 DIAGNOSIS — E11.9 TYPE 2 DIABETES MELLITUS WITHOUT COMPLICATION, WITHOUT LONG-TERM CURRENT USE OF INSULIN (HCC): Primary | ICD-10-CM

## 2021-02-02 RX ORDER — INSULIN DEGLUDEC INJECTION 100 U/ML
15 INJECTION, SOLUTION SUBCUTANEOUS DAILY
Qty: 5 PEN | Refills: 5 | Status: SHIPPED
Start: 2021-02-02 | End: 2021-04-26 | Stop reason: SDUPTHER

## 2021-02-10 ENCOUNTER — APPOINTMENT (OUTPATIENT)
Dept: GENERAL RADIOLOGY | Age: 46
End: 2021-02-10
Payer: COMMERCIAL

## 2021-02-10 ENCOUNTER — VIRTUAL VISIT (OUTPATIENT)
Dept: PRIMARY CARE CLINIC | Age: 46
End: 2021-02-10
Payer: COMMERCIAL

## 2021-02-10 ENCOUNTER — HOSPITAL ENCOUNTER (EMERGENCY)
Age: 46
Discharge: HOME | End: 2021-02-11
Attending: EMERGENCY MEDICINE
Payer: COMMERCIAL

## 2021-02-10 ENCOUNTER — TELEPHONE (OUTPATIENT)
Dept: PRIMARY CARE CLINIC | Age: 46
End: 2021-02-10

## 2021-02-10 DIAGNOSIS — E11.65 TYPE 2 DIABETES MELLITUS WITH HYPERGLYCEMIA, WITHOUT LONG-TERM CURRENT USE OF INSULIN (HCC): ICD-10-CM

## 2021-02-10 DIAGNOSIS — U07.1 COVID-19: Primary | ICD-10-CM

## 2021-02-10 DIAGNOSIS — B37.31 VAGINAL CANDIDA: ICD-10-CM

## 2021-02-10 DIAGNOSIS — U07.1 COVID-19 VIRUS INFECTION: ICD-10-CM

## 2021-02-10 DIAGNOSIS — B96.89 ACUTE BACTERIAL SINUSITIS: ICD-10-CM

## 2021-02-10 DIAGNOSIS — R19.7 DIARRHEA, UNSPECIFIED TYPE: ICD-10-CM

## 2021-02-10 DIAGNOSIS — R07.89 CHEST WALL PAIN: Primary | ICD-10-CM

## 2021-02-10 DIAGNOSIS — J01.90 ACUTE BACTERIAL SINUSITIS: ICD-10-CM

## 2021-02-10 LAB
ALBUMIN SERPL-MCNC: 3.8 G/DL (ref 3.5–5.2)
ALP BLD-CCNC: 105 U/L (ref 35–104)
ALT SERPL-CCNC: 40 U/L (ref 0–32)
ANION GAP SERPL CALCULATED.3IONS-SCNC: 10 MMOL/L (ref 7–16)
ANISOCYTOSIS: ABNORMAL
AST SERPL-CCNC: 34 U/L (ref 0–31)
BASOPHILS ABSOLUTE: 0 E9/L (ref 0–0.2)
BASOPHILS RELATIVE PERCENT: 0 % (ref 0–2)
BILIRUB SERPL-MCNC: 0.5 MG/DL (ref 0–1.2)
BUN BLDV-MCNC: 9 MG/DL (ref 6–20)
CALCIUM SERPL-MCNC: 8.9 MG/DL (ref 8.6–10.2)
CHLORIDE BLD-SCNC: 102 MMOL/L (ref 98–107)
CO2: 24 MMOL/L (ref 22–29)
CREAT SERPL-MCNC: 0.6 MG/DL (ref 0.5–1)
EOSINOPHILS ABSOLUTE: 0 E9/L (ref 0.05–0.5)
EOSINOPHILS RELATIVE PERCENT: 0 % (ref 0–6)
GFR AFRICAN AMERICAN: >60
GFR NON-AFRICAN AMERICAN: >60 ML/MIN/1.73
GLUCOSE BLD-MCNC: 202 MG/DL (ref 74–99)
HCT VFR BLD CALC: 40.4 % (ref 34–48)
HEMOGLOBIN: 12.9 G/DL (ref 11.5–15.5)
LYMPHOCYTES ABSOLUTE: 3.96 E9/L (ref 1.5–4)
LYMPHOCYTES RELATIVE PERCENT: 45.5 % (ref 20–42)
MCH RBC QN AUTO: 24.5 PG (ref 26–35)
MCHC RBC AUTO-ENTMCNC: 31.9 % (ref 32–34.5)
MCV RBC AUTO: 76.7 FL (ref 80–99.9)
METAMYELOCYTES RELATIVE PERCENT: 2.7 % (ref 0–1)
MONOCYTES ABSOLUTE: 0.43 E9/L (ref 0.1–0.95)
MONOCYTES RELATIVE PERCENT: 5.4 % (ref 2–12)
NEUTROPHILS ABSOLUTE: 4.21 E9/L (ref 1.8–7.3)
NEUTROPHILS RELATIVE PERCENT: 46.4 % (ref 43–80)
NUCLEATED RED BLOOD CELLS: 0.9 /100 WBC
PDW BLD-RTO: 16.1 FL (ref 11.5–15)
PLATELET # BLD: 274 E9/L (ref 130–450)
PMV BLD AUTO: 9.4 FL (ref 7–12)
POTASSIUM REFLEX MAGNESIUM: 3.8 MMOL/L (ref 3.5–5)
RBC # BLD: 5.27 E12/L (ref 3.5–5.5)
SODIUM BLD-SCNC: 136 MMOL/L (ref 132–146)
TOTAL PROTEIN: 7.1 G/DL (ref 6.4–8.3)
TROPONIN: <0.01 NG/ML (ref 0–0.03)
WBC # BLD: 8.6 E9/L (ref 4.5–11.5)

## 2021-02-10 PROCEDURE — 84484 ASSAY OF TROPONIN QUANT: CPT

## 2021-02-10 PROCEDURE — 99285 EMERGENCY DEPT VISIT HI MDM: CPT

## 2021-02-10 PROCEDURE — 2580000003 HC RX 258: Performed by: EMERGENCY MEDICINE

## 2021-02-10 PROCEDURE — 71045 X-RAY EXAM CHEST 1 VIEW: CPT

## 2021-02-10 PROCEDURE — 85378 FIBRIN DEGRADE SEMIQUANT: CPT

## 2021-02-10 PROCEDURE — 80053 COMPREHEN METABOLIC PANEL: CPT

## 2021-02-10 PROCEDURE — 85025 COMPLETE CBC W/AUTO DIFF WBC: CPT

## 2021-02-10 PROCEDURE — 99214 OFFICE O/P EST MOD 30 MIN: CPT | Performed by: FAMILY MEDICINE

## 2021-02-10 RX ORDER — FLUCONAZOLE 150 MG/1
TABLET ORAL
Qty: 2 TABLET | Refills: 0 | Status: SHIPPED
Start: 2021-02-10 | End: 2021-08-10

## 2021-02-10 RX ORDER — 0.9 % SODIUM CHLORIDE 0.9 %
500 INTRAVENOUS SOLUTION INTRAVENOUS ONCE
Status: COMPLETED | OUTPATIENT
Start: 2021-02-10 | End: 2021-02-11

## 2021-02-10 RX ORDER — DOXYCYCLINE HYCLATE 100 MG/1
100 CAPSULE ORAL 2 TIMES DAILY
Qty: 20 CAPSULE | Refills: 0 | Status: SHIPPED | OUTPATIENT
Start: 2021-02-10 | End: 2021-02-20

## 2021-02-10 RX ORDER — ALBUTEROL SULFATE 90 UG/1
AEROSOL, METERED RESPIRATORY (INHALATION)
Qty: 1 INHALER | Refills: 0 | Status: SHIPPED
Start: 2021-02-10 | End: 2022-07-12

## 2021-02-10 RX ADMIN — SODIUM CHLORIDE 500 ML: 9 INJECTION, SOLUTION INTRAVENOUS at 23:12

## 2021-02-10 ASSESSMENT — PAIN SCALES - GENERAL: PAINLEVEL_OUTOF10: 5

## 2021-02-10 ASSESSMENT — PAIN DESCRIPTION - LOCATION: LOCATION: CHEST

## 2021-02-10 NOTE — TELEPHONE ENCOUNTER
Pt calling stating she had a virtual with you today and you mentioned if she wants a CXR to call back and you would order. Pt would like a CXR ordered please.  [facility] will [facility]

## 2021-02-10 NOTE — PROGRESS NOTES
TeleMedicine Patient Consent    This visit was performed as a virtual video visit using a synchronous, two-way, audio-video telehealth technology platform. Patient identification was verified at the start of the visit, including the patient's telephone number and physical location. I discussed with the patient the nature of our telehealth visits, that:     1. Due to the nature of an audio- video modality, the only components of a physical exam that could be done are the elements supported by direct observation. 2. I would evaluate the patient and recommend diagnostics and treatments based on my assessment. 3. If it was felt that the patient should be evaluated in clinic or an emergency room setting, then they would be directed there. 4. Our sessions are not being recorded and that personal health information is protected. 5. Our team would provide follow up care in person if/when the patient needs it. Patient does agree to proceed with telemedicine consultation. Patient's location: home address in [facility]    Physician  location other address in [place]     Other people involved in call:   Spouse    This visit was completed virtually using Doxy. me    2/10/2021    TELEHEALTH EVALUATION -- Audio/Visual (During [id number] public health emergency)    Chief Complaint   Patient presents with    Concern For COVID-19     tested positive     Fatigue    Cough    Congestion    Diarrhea           HPI:    Elbert Olivares (:  1975) has requested an audio/video evaluation for the following concern(s): Developed sxs Sat 1/30, tested pos on 2/2. Previous f/c, 99.8 yesterday, not today. But has continued congestion worsening sinus pressure thick rhinorrhea postnasal drainage cough nonproductive but does feel short of breath at times, no chest pain dizziness syncope near syncope diaphoresis abdominal pain, some nausea no vomiting, up to 3 loose bowel movements a day. No leg pain or swelling. Risk factors reviewed. ROS:  Const: Notes malaise  Eyes: Denies discharge, pain, redness and visual disturbance. ENMT: As above denies mouth and tongue lesions and sore throat. CV: Denies chest discomfort, pain; diaphoresis, dizziness, edema, lightheadedness, orthopnea,  palpitations, syncope and near syncopal episode or any exertional symptoms  Resp: Denies   hemoptysis, pleuritic pain,   sputum production. She does note some shortness of breath and wheezing  GI: Denies abdominal pain,  hematochezia, melena, and vomiting. : Denies urinary symptoms including dysuria , urgency, frequency or hematuria. Musculo: Denies musculoskeletal symptoms. Skin: Denies bruising and rash. Neuro: Denies headache, numbness, stiff neck, tingling and focal weakness slurred speech or facial  droop  Hema/Lymph: Denies bleeding/bruising tendency and enlarged lymph nodes         Current Outpatient Medications:     doxycycline hyclate (VIBRAMYCIN) 100 MG capsule, Take 1 capsule by mouth 2 times daily for 10 days, Disp: 20 capsule, Rfl: 0    albuterol sulfate  (90 Base) MCG/ACT inhaler, 1-2 puffs q4-6 hrs prn, Disp: 1 Inhaler, Rfl: 0    fluconazole (DIFLUCAN) 150 MG tablet, Take 1 po qd x 1 at onset of symptoms.  Repeat  x 1 after course of antibiotic, Disp: 2 tablet, Rfl: 0    Insulin Degludec (TRESIBA FLEXTOUCH) 100 UNIT/ML SOPN, Inject 15 Units into the skin daily, Disp: 5 pen, Rfl: 5    Insulin Pen Needle (BD PEN NEEDLE MICRO U/F) 32G X 6 MM MISC, Uses with insulin daily, Disp: 150 each, Rfl: 5   glipiZIDE (GLUCOTROL XL) 10 MG extended release tablet, TAKE 1 TABLET BY MOUTH TWICE A DAY, Disp: 180 tablet, Rfl: 5    chlorpheniramine (ALLER-CHLOR) 4 MG tablet, Take 1 tablet by mouth every 6 hours as needed for Allergies, Disp: 20 tablet, Rfl: 0    cetirizine (ZYRTEC) 10 MG tablet, Take 1 tablet by mouth daily, Disp: 30 tablet, Rfl: 1    buPROPion (WELLBUTRIN SR) 150 MG extended release tablet, Take 1 tablet by mouth every morning, Disp: 90 tablet, Rfl: 3    levothyroxine (SYNTHROID) 75 MCG tablet, Take 1 tablet by mouth Daily, Disp: 90 tablet, Rfl: 3    rosuvastatin (CRESTOR) 10 MG tablet, Take 1 tablet by mouth daily, Disp: 30 tablet, Rfl: 3    Blood Glucose Monitoring Suppl MISC, Blood glucose meter - use as directed, Disp: 1 each, Rfl: 1    blood glucose monitor strips, Use to check blood sugar 3 times/day, Disp: 100 strip, Rfl: 5    Lancets MISC, Use to test blood sugar 3 times a day, Disp: 100 each, Rfl: 5    aspirin 81 MG EC tablet, Take 81 mg by mouth daily, Disp: , Rfl:     B Complex-C (SUPER B COMPLEX/VITAMIN C PO), Take by mouth daily, Disp: , Rfl:     loratadine (CLARITIN) 10 MG tablet, Take 10 mg by mouth daily, Disp: , Rfl:     metFORMIN (GLUCOPHAGE-XR) 500 MG extended release tablet, Take 2 tablets by mouth 2 times daily, Disp: 360 tablet, Rfl: 3    omeprazole (PRILOSEC) 20 MG delayed release capsule, Take 1 capsule by mouth daily, Disp: 90 capsule, Rfl: 3    Cholecalciferol (VITAMIN D3) 2000 units CAPS, Take 2,000 Units by mouth daily , Disp: , Rfl:   Allergies   Allergen Reactions    Celecoxib Hives       Past Medical History:   Diagnosis Date    Depression     Diabetes mellitus (HCC)     GERD (gastroesophageal reflux disease)     Joint pain     Thyroid disease      Past Surgical History:   Procedure Laterality Date     SECTION      CHOLECYSTECTOMY      HYSTERECTOMY       No family history on file.   Social History     Tobacco Use  Smoking status: Never Smoker    Smokeless tobacco: Never Used   Substance Use Topics    Alcohol use: No    Drug use: No     Social History     Social History Narrative    PMH:    Problem List: Eruption, Infestation by Sarcoptes scabiei sherly hominis, Dysthymic disorder, Peptic reflux    disease, Dysthymia, Hypothyroidism, Adult health examination        Health Maintenance:    Mammogram - (10/1/2018)    Mammogram Screening - (10/1/2018)    Influenza Vaccination - (9/1/2015)        Medical Problems:    vitamin D def, Asthma    Hypothyroidism - partial thyroidectomy (half of left lobe); benign nodule    Anxiety    GERD - EGD 2011    Seasonal Allergies, Type 2 Diabetes        Surgical Hx:    C/S    Partial Hysterectomy - left ovary remains (bleeding issues)    Benita        FH:    Father:    . (Hx)    Mother:    . (Hx)    Mom - thyroid cancer, melanoma    Dad - depression    cousin - melanoma        SH:    . (Marital)Stay at home;    3 kids (8,7,5 as of 2014). No complications during pregnancy. Paola Brewer ; works at [employer]    Personal Habits: Cigarette Use: Nonsmoker. Alcohol: Denies alcohol use. PHYSICAL EXAMINATION:  [ INSTRUCTIONS:  \"[x]\" Indicates a positive item  \"[]\" Indicates a negative item  -- DELETE ALL ITEMS NOT EXAMINED]  Vital Signs: (As obtained by patient/caregiver or practitioner observation)    There were no vitals filed for this visit. Blood pressure-  Heart rate-    Respiratory rate-    Temperature-  Pulse oximetry-     Constitutional: [x] Appears well-developed and well-nourished [x] No apparent distress      [] Abnormal-   Mental status  [x] Alert and awake  [x] Oriented to person/place/time [x]Able to follow commands      Eyes:  EOM    [x]  Normal  [] Abnormal-  Sclera  [x]  Normal  [] Abnormal -         Discharge [x]  None visible  [] Abnormal -    HENT:   [x] Normocephalic, atraumatic.   [] Abnormal   [x] Mouth/Throat: Mucous membranes are moist. External Ears [x] Normal  [] Abnormal-     Neck: [x] No visualized mass     Pulmonary/Chest: [x] Respiratory effort normal.  [x] No visualized signs of difficulty breathing or respiratory distress        [] Abnormal-      Musculoskeletal:   [x] Normal gait with no signs of ataxia         [x] Normal range of motion of neck        [] Abnormal-       Neurological:        [x] No Facial Asymmetry (Cranial nerve 7 motor function) (limited exam to video visit)          [x] No gaze palsy        [] Abnormal-         Skin:        [x] No significant exanthematous lesions or discoloration noted on facial skin         [] Abnormal-            Psychiatric:       [x] Normal Affect [x] No Hallucinations        [] Abnormal-     Other pertinent observable physical exam findings-          Diagnosis Orders   1. COVID-19  doxycycline hyclate (VIBRAMYCIN) 100 MG capsule    albuterol sulfate  (90 Base) MCG/ACT inhaler    fluconazole (DIFLUCAN) 150 MG tablet   2. Type 2 diabetes mellitus with hyperglycemia, without long-term current use of insulin ([facility])     3. Vaginal candida     4. Diarrhea, unspecified type     5. Acute bacterial sinusitis         1. COVID-19  Unseld, see below  - doxycycline hyclate (VIBRAMYCIN) 100 MG capsule; Take 1 capsule by mouth 2 times daily for 10 days  Dispense: 20 capsule; Refill: 0  - albuterol sulfate  (90 Base) MCG/ACT inhaler; 1-2 puffs q4-6 hrs prn  Dispense: 1 Inhaler; Refill: 0  - fluconazole (DIFLUCAN) 150 MG tablet; Take 1 po qd x 1 at onset of symptoms. Repeat  x 1 after course of antibiotic  Dispense: 2 tablet; Refill: 0    2. Type 2 diabetes mellitus with hyperglycemia, without long-term current use of insulin ([facility])  Counseled, increasing risk of Covid. 3. Vaginal candida  Request Diflucan for anticipated vaginal yeast infection    4. Diarrhea, unspecified type  Counseled, likely from #1, proper hydration emphasized  5.   Sinusitis  Possible secondary sinusitis The unfortunate uncertainties and unpredictable nature of this virus reviewed. Various potential outcomes reviewed including complete recovery, debilitating sequelae which can be permanent as well as fatality. Certain aspects of this virus including the potential thrombotic risk can cause sudden debilitating/fatal events, without warning. Therefore signs and symptoms to watch for reviewed at length, with very low threshold for reevaluation if needed and presentation to the emergency room immediately if any serious signs or symptoms which were reviewed at length, including the persistence or worsening of any symptoms mentioned above. The risk of postponing this reviewed. The patient verbalizes complete understanding, verbalizes understanding of various options and agrees to proceed as above. As long as symptoms steadily improve/resolve she is going to plan to follow-up with me Monday, about 5 days, sooner as needed              Counseled extensively and differential diagnoses of above were reviewed, including serious etiologies. Side effects and interactions of medications were reviewed. Plan as above:  Discussion shared decision making would like to proceed as above. Wrote for Doxy and albuterol. Counseled extensively. As long as symptoms steadily improve/resolve and medical conditions are following the expected course, FU as below, sooner PRN      Return in about 1 week (around 2/17/2021), or if symptoms worsen or fail to improve.       Time spent: Greater than Not billed by time Kelvin Chavarria is a 41 y.o. female being evaluated by a Virtual Visit (video visit) encounter to address concerns as mentioned above. A caregiver was present when appropriate. Due to this being a TeleHealth encounter (During BAJF-75 public health emergency), evaluation of the following organ systems was limited: Vitals/Constitutional/EENT/Resp/CV/GI//MS/Neuro/Skin/Heme-Lymph-Imm. Pursuant to the emergency declaration under the [address] [place] [address] and the [employer] and [employer] Act, this Virtual Visit was conducted with patient's (and/or legal guardian's) consent, to reduce the patient's risk of exposure to COVID-19 and provide necessary medical care. The patient (and/or legal guardian) has also been advised to contact this office for worsening conditions or problems, and seek emergency medical treatment and/or call 911 if deemed necessary. Services were provided through a video synchronous discussion virtually to substitute for in-person clinic visit. Various options to be seen in person were discussed. Patients are advised to check with insurance company to ensure coverage and to fully understand benefits and cost prior to any testing to try to avoid unexpected charges. This note was created with the assistance of voice recognition software. Inadvertent errors may be present. Signs and symptoms to watch for were discussed. Serious signs and symptoms reviewed. ER if any    --Nereyda Ward MD on 2/10/2021 at 12:19 PM    An electronic signature was used to authenticate this note.

## 2021-02-11 VITALS
OXYGEN SATURATION: 96 % | HEART RATE: 92 BPM | SYSTOLIC BLOOD PRESSURE: 134 MMHG | BODY MASS INDEX: 35.79 KG/M2 | RESPIRATION RATE: 18 BRPM | HEIGHT: 70 IN | TEMPERATURE: 98.3 F | WEIGHT: 250 LBS | DIASTOLIC BLOOD PRESSURE: 70 MMHG

## 2021-02-11 LAB
D DIMER: <200 NG/ML DDU
EKG ATRIAL RATE: 90 BPM
EKG P AXIS: 47 DEGREES
EKG P-R INTERVAL: 176 MS
EKG Q-T INTERVAL: 394 MS
EKG QRS DURATION: 104 MS
EKG QTC CALCULATION (BAZETT): 481 MS
EKG R AXIS: 22 DEGREES
EKG T AXIS: 47 DEGREES
EKG VENTRICULAR RATE: 90 BPM

## 2021-02-11 PROCEDURE — 93005 ELECTROCARDIOGRAM TRACING: CPT | Performed by: PHYSICIAN ASSISTANT

## 2021-02-11 NOTE — ED PROVIDER NOTES
[facility]  Department of Emergency Medicine   ED  Encounter Note  Admit Date/RoomTime: 2/10/2021 10:19 PM  ED Room: 10/10    NAME: Beryle Flavin  : 1975  MRN: [id number]     Chief Complaint:  Shortness of Breath (covid + 2/2, cough, chest tightness)    History of Present Illness          Beryle Flavin is a 41 y.o. old female who presents to the emergency department for evaluation of chest pain which is worsened with coughing and deep breathing. She describes a tight and sometimes sharp pain to the left of her sternum. She was recently diagnosed with COVID-19 infection. She has only very mild shortness of breath on exertion such as when she climbs up stairs. She is still having some low-grade fevers. She denies any leg pain or swelling. No prior history of coronary artery disease, PE or DVT. .  ROS   Pertinent positives and negatives are stated within HPI, all other systems reviewed and are negative. Past Medical History:  has a past medical history of Depression, Diabetes mellitus ([address] GERD (gastroesophageal reflux disease), Joint pain, and Thyroid disease. Surgical History:  has a past surgical history that includes Hysterectomy; Cholecystectomy; and  section. Social History:  reports that she has never smoked. She has never used smokeless tobacco. She reports that she does not drink alcohol or use drugs. Family History: family history is not on file. Allergies: Celecoxib    Physical Exam   Oxygen Saturation Interpretation: Normal.        ED Triage Vitals [02/10/21 2025]   BP Temp Temp src Pulse Resp SpO2 Height Weight   (!) 165/81 98.3 °F (36.8 °C) -- 99 16 98 % 5' 10\" (1.778 m) 250 lb (113.4 kg)         General Appearance/Constitutional:  Alert, development consistent with age, NAD. HEENT:  NC/NT. PERRLA. Airway patent. Neck:  Normal ROM. Supple. Respiratory:  Scattered rhonchi.   CV:  Regular rate and rhythm, normal heart sounds, without pathological murmurs, ectopy, gallops, or rubs. Chest:  Symmetrical without visible rash or tenderness. GI:  Abdomen Soft, nontender, good bowel sounds. No firm or pulsatile mass. Back:  No costovertebral tenderness. Extremities: No tenderness or edema noted. Lymphatics: no lymphadenopathy noted  Integument:  Normal turgor. Warm, dry, without visible rash, unless noted elsewhere. Neurological:  Oriented. Motor functions intact.    Psychiatric:  Affect normal.    Lab / Imaging Results   (All laboratory and radiology results have been personally reviewed by myself)  Labs:  Results for orders placed or performed during the hospital encounter of 02/10/21   CBC Auto Differential   Result Value Ref Range    WBC 8.6 4.5 - 11.5 E9/L    RBC 5.27 3.50 - 5.50 E12/L    Hemoglobin 12.9 11.5 - 15.5 g/dL    Hematocrit 40.4 34.0 - 48.0 %    MCV 76.7 (L) 80.0 - 99.9 fL    MCH 24.5 (L) 26.0 - 35.0 pg    MCHC 31.9 (L) 32.0 - 34.5 %    RDW 16.1 (H) 11.5 - 15.0 fL    Platelets 953 078 - 465 E9/L    MPV 9.4 7.0 - 12.0 fL    Neutrophils % 46.4 43.0 - 80.0 %    Lymphocytes % 45.5 (H) 20.0 - 42.0 %    Monocytes % 5.4 2.0 - 12.0 %    Eosinophils % 0.0 0.0 - 6.0 %    Basophils % 0.0 0.0 - 2.0 %    Neutrophils Absolute 4.21 1.80 - 7.30 E9/L    Lymphocytes Absolute 3.96 1.50 - 4.00 E9/L    Monocytes Absolute 0.43 0.10 - 0.95 E9/L    Eosinophils Absolute 0.00 (L) 0.05 - 0.50 E9/L    Basophils Absolute 0.00 0.00 - 0.20 E9/L    Metamyelocytes Relative 2.7 (H) 0.0 - 1.0 %    nRBC 0.9 /100 WBC    Anisocytosis 1+    Comprehensive Metabolic Panel w/ Reflex to MG   Result Value Ref Range    Sodium 136 132 - 146 mmol/L    Potassium reflex Magnesium 3.8 3.5 - 5.0 mmol/L    Chloride 102 98 - 107 mmol/L    CO2 24 22 - 29 mmol/L    Anion Gap 10 7 - 16 mmol/L    Glucose 202 (H) 74 - 99 mg/dL    BUN 9 6 - 20 mg/dL    CREATININE 0.6 0.5 - 1.0 mg/dL    GFR Non-African American >60 >=60 mL/min/1.73    GFR African American >60     Calcium 8.9 8.6 - 10.2 mg/dL    Total Protein 7.1 6.4 - 8.3 g/dL    Albumin 3.8 3.5 - 5.2 g/dL    Total Bilirubin 0.5 0.0 - 1.2 mg/dL    Alkaline Phosphatase 105 (H) 35 - 104 U/L    ALT 40 (H) 0 - 32 U/L    AST 34 (H) 0 - 31 U/L   Troponin   Result Value Ref Range    Troponin <0.01 0.00 - 0.03 ng/mL   D-Dimer, Quantitative   Result Value Ref Range    D-Dimer, Quant <200 ng/mL DDU       Imaging: All Radiology results interpreted by Radiologist unless otherwise noted. XR CHEST PORTABLE   Final Result   Early ground-glass opacities in the right mid and lower lung. EKG #1:  Interpreted by emergency department physician unless otherwise noted. Time:  0:06    Rate: 90 bpm  Rhythm: Sinus rhythm. Interpretation: Normal sinus rhythm. Comparison: Today's ECG is unchanged from previous tracings. ED Course / Medical Decision Making     Medications   0.9 % sodium chloride bolus (0 mLs Intravenous Stopped 2/11/21 0007)        Re-Evaluations:  2/10/21      Patients condition remains unchanged. Consultations:             None    Procedures:   none    MDM: Patient presents to the ED for evaluation of pleuritic chest pain. She was recently diagnosed with COVID-19 infection. She is declining pain medication at this time. Chest x-ray showed changes consistent with COVID-19. Cardiac work-up negative for ischemia or dysrhythmia. D-dimer was negative. Patient was reassured, likely this is chest wall pain. Supportive care advised. She will follow-up with her family doctor. Return precautions to the ED discussed. Plan of Care/Counseling:  I reviewed today's visit with the patient in addition to providing specific details for the plan of care and counseling regarding the diagnosis and prognosis. Questions are answered at this time and are agreeable with the plan. Assessment      1. Chest wall pain    2.  COVID-19 virus infection      This patient's ED course included: a personal history and physicial examination  This patient has remained hemodynamically stable during their ED course. Plan   Discharge home. Patient condition is stable. New Medications     New Prescriptions    No medications on file     Electronically signed by Yaya Genao DO   DD: 2/10/21  **This report was transcribed using voice recognition software. Every effort was made to ensure accuracy; however, inadvertent computerized transcription errors may be present.   END OF PROVIDER NOTE        Yaya Genao DO  02/11/21 0023

## 2021-02-11 NOTE — ED NOTES
FIRST PROVIDER CONTACT ASSESSMENT NOTE      Department of Emergency Medicine   ED  First Provider Note   2/10/21  8:24 PM EST    Chief Complaint: No chief complaint on file. History of Present Illness:    Dewayne Rajput is a 41 y.o. female who presents to the ED by private car for chest pain covid positive   Focused Screening Exam:  Constitutional:  Alert, appears stated age and is in no distress.   Heart rrr   Lungs clear    *ALLERGIES*     Celecoxib     ED Triage Vitals   BP Temp Temp src Pulse Resp SpO2 Height Weight   -- -- -- -- -- -- -- --        Initial Plan of Care:  Initiate Treatment-Testing, Proceed toTreatment Area When Bed Available for ED Attending/MLP to Continue Care    -----------------END OF FIRST PROVIDER CONTACT ASSESSMENT NOTE--------------  Electronically signed by BLU Gamez   DD: 2/10/21     BLU Gamez  02/10/21 2025

## 2021-02-11 NOTE — ED NOTES
HR 99, 97% on RA     Deon [facility], [address] [place] [address]  02/10/21 2019

## 2021-02-15 ENCOUNTER — VIRTUAL VISIT (OUTPATIENT)
Dept: PRIMARY CARE CLINIC | Age: 46
End: 2021-02-15
Payer: COMMERCIAL

## 2021-02-15 DIAGNOSIS — E11.65 TYPE 2 DIABETES MELLITUS WITH HYPERGLYCEMIA, WITHOUT LONG-TERM CURRENT USE OF INSULIN (HCC): ICD-10-CM

## 2021-02-15 DIAGNOSIS — R19.7 DIARRHEA, UNSPECIFIED TYPE: ICD-10-CM

## 2021-02-15 DIAGNOSIS — J18.9 PNEUMONIA OF RIGHT LUNG DUE TO INFECTIOUS ORGANISM, UNSPECIFIED PART OF LUNG: ICD-10-CM

## 2021-02-15 DIAGNOSIS — U07.1 COVID-19: Primary | ICD-10-CM

## 2021-02-15 DIAGNOSIS — J01.90 ACUTE BACTERIAL SINUSITIS: ICD-10-CM

## 2021-02-15 DIAGNOSIS — B96.89 ACUTE BACTERIAL SINUSITIS: ICD-10-CM

## 2021-02-15 PROCEDURE — 99214 OFFICE O/P EST MOD 30 MIN: CPT | Performed by: FAMILY MEDICINE

## 2021-02-15 NOTE — PROGRESS NOTES
TeleMedicine Patient Consent    This visit was performed as a virtual video visit using a synchronous, two-way, audio-video telehealth technology platform. Patient identification was verified at the start of the visit, including the patient's telephone number and physical location. I discussed with the patient the nature of our telehealth visits, that:     1. Due to the nature of an audio- video modality, the only components of a physical exam that could be done are the elements supported by direct observation. 2. I would evaluate the patient and recommend diagnostics and treatments based on my assessment. 3. If it was felt that the patient should be evaluated in clinic or an emergency room setting, then they would be directed there. 4. Our sessions are not being recorded and that personal health information is protected. 5. Our team would provide follow up care in person if/when the patient needs it. Patient does agree to proceed with telemedicine consultation. Patient's location: home address in [facility]    Physician  location other address in [place]     Other people involved in call:   Spouse    This visit was completed virtually using Doxy. me    2/15/2021    TELEHEALTH EVALUATION -- Audio/Visual (During [id number] public health emergency)    Chief Complaint   Patient presents with    Positive For Covid-19     follow up     Shortness of Breath           HPI:    [address] Browne (:  1975) has requested an audio/video evaluation for the following concern(s): Developed sxs Sat 1/30, tested pos on 2/2. She is continually feeling better. After she spoke with me, She states she went to the emergency room on 2/10 with chest pain shortness of breath. She states she was told things were well and was sent home with no additional treatment. Her chest x-ray was read out as early groundglass opacities in the right mid and lower lung. Her shortness of breath continues to improve. Less wheezing and inhaler works when she has wheezing. Cough nonproductive. Minimal sinus symptoms. Still loss of smell taste but no fever chills malaise abdominal symptoms leg pain swelling or otherwise. ROS:  Const: Feels improved overall  Eyes: Denies discharge, pain, redness and visual disturbance. ENMT: Less nasal congestion, still abnormal smell taste CV: Denies chest discomfort, pain; diaphoresis, dizziness, edema, lightheadedness, orthopnea,  palpitations, syncope and near syncopal episode or any exertional symptoms  Resp: Denies   hemoptysis, pleuritic pain,   sputum production. Improved wheezing and shortness of breath she states GI: Denies abdominal pain,  hematochezia, melena, and vomiting. : Denies urinary symptoms including dysuria , urgency, frequency or hematuria. Musculo: Denies musculoskeletal symptoms. Skin: Denies bruising and rash. Neuro: Denies headache, numbness, stiff neck, tingling and focal weakness slurred speech or facial  droop  Hema/Lymph: Denies bleeding/bruising tendency and enlarged lymph nodes         Current Outpatient Medications:     doxycycline hyclate (VIBRAMYCIN) 100 MG capsule, Take 1 capsule by mouth 2 times daily for 10 days, Disp: 20 capsule, Rfl: 0    albuterol sulfate  (90 Base) MCG/ACT inhaler, 1-2 puffs q4-6 hrs prn, Disp: 1 Inhaler, Rfl: 0    fluconazole (DIFLUCAN) 150 MG tablet, Take 1 po qd x 1 at onset of symptoms.  Repeat  x 1 after course of antibiotic, Disp: 2 tablet, Rfl: 0   Insulin Degludec (TRESIBA FLEXTOUCH) 100 UNIT/ML SOPN, Inject 15 Units into the skin daily, Disp: 5 pen, Rfl: 5    Insulin Pen Needle (BD PEN NEEDLE MICRO U/F) 32G X 6 MM MISC, Uses with insulin daily, Disp: 150 each, Rfl: 5    glipiZIDE (GLUCOTROL XL) 10 MG extended release tablet, TAKE 1 TABLET BY MOUTH TWICE A DAY, Disp: 180 tablet, Rfl: 5    chlorpheniramine (ALLER-CHLOR) 4 MG tablet, Take 1 tablet by mouth every 6 hours as needed for Allergies, Disp: 20 tablet, Rfl: 0    cetirizine (ZYRTEC) 10 MG tablet, Take 1 tablet by mouth daily, Disp: 30 tablet, Rfl: 1    buPROPion (WELLBUTRIN SR) 150 MG extended release tablet, Take 1 tablet by mouth every morning, Disp: 90 tablet, Rfl: 3    levothyroxine (SYNTHROID) 75 MCG tablet, Take 1 tablet by mouth Daily, Disp: 90 tablet, Rfl: 3    rosuvastatin (CRESTOR) 10 MG tablet, Take 1 tablet by mouth daily, Disp: 30 tablet, Rfl: 3    Blood Glucose Monitoring Suppl MISC, Blood glucose meter - use as directed, Disp: 1 each, Rfl: 1    blood glucose monitor strips, Use to check blood sugar 3 times/day, Disp: 100 strip, Rfl: 5    Lancets MISC, Use to test blood sugar 3 times a day, Disp: 100 each, Rfl: 5    aspirin 81 MG EC tablet, Take 81 mg by mouth daily, Disp: , Rfl:     B Complex-C (SUPER B COMPLEX/VITAMIN C PO), Take by mouth daily, Disp: , Rfl:     loratadine (CLARITIN) 10 MG tablet, Take 10 mg by mouth daily, Disp: , Rfl:     metFORMIN (GLUCOPHAGE-XR) 500 MG extended release tablet, Take 2 tablets by mouth 2 times daily, Disp: 360 tablet, Rfl: 3    omeprazole (PRILOSEC) 20 MG delayed release capsule, Take 1 capsule by mouth daily, Disp: 90 capsule, Rfl: 3    Cholecalciferol (VITAMIN D3) 2000 units CAPS, Take 2,000 Units by mouth daily , Disp: , Rfl:   Allergies   Allergen Reactions    Celecoxib Hives       Past Medical History:   Diagnosis Date    Depression     Diabetes mellitus (HCC)     GERD (gastroesophageal reflux disease)     Joint pain  Thyroid disease      Past Surgical History:   Procedure Laterality Date     SECTION      CHOLECYSTECTOMY      HYSTERECTOMY       No family history on file. Social History     Tobacco Use    Smoking status: Never Smoker    Smokeless tobacco: Never Used   Substance Use Topics    Alcohol use: No    Drug use: No     Social History     Social History Narrative    PMH:    Problem List: Eruption, Infestation by Sarcoptes scabiei sherly hominis, Dysthymic disorder, Peptic reflux    disease, Dysthymia, Hypothyroidism, Adult health examination        Health Maintenance:    Mammogram - (10/1/2018)    Mammogram Screening - (10/1/2018)    Influenza Vaccination - (2015)        Medical Problems:    vitamin D def, Asthma    Hypothyroidism - partial thyroidectomy (half of left lobe); benign nodule    Anxiety    GERD - EGD     Seasonal Allergies, Type 2 Diabetes        Surgical Hx:    C/S    Partial Hysterectomy - left ovary remains (bleeding issues)    Benita        FH:    Father:    . (Hx)    Mother:    . (Hx)    Mom - thyroid cancer, melanoma    Dad - depression    cousin - melanoma        SH:    . (Marital)Stay at home;    3 kids (8,7,5 as of ). No complications during pregnancy. Royer Dys ; works at [employer]    Personal Habits: Cigarette Use: Nonsmoker. Alcohol: Denies alcohol use. PHYSICAL EXAMINATION:  [ INSTRUCTIONS:  \"[x]\" Indicates a positive item  \"[]\" Indicates a negative item  -- DELETE ALL ITEMS NOT EXAMINED]  Vital Signs: (As obtained by patient/caregiver or practitioner observation)    There were no vitals filed for this visit.       Blood pressure-  Heart rate-    Respiratory rate-    Temperature-  Pulse oximetry-     Constitutional: [x] Appears well-developed and well-nourished [x] No apparent distress      [] Abnormal-   Mental status  [x] Alert and awake  [x] Oriented to person/place/time [x]Able to follow commands      Eyes:  EOM    [x]  Normal  [] Abnormal- Positive Covid, tested +2/2, symptoms since January 30. Counseled extensively. Potential comorbidities that may increase risk reviewed. Standard precautions reviewed. Quarantine recommendations reviewed. There was shared decision making throughout the process. Counseled on the  risk and benefits of, and literature regarding the use of various over-the-counter products including (at appropriate doses and dosing intervals if no contraindication) Tylenol, zinc, vitamin C, probiotics etc.  Also counseled on appropriate hydration, potential role of prone position, use of nasal saline, coolmist vaporizer, honey, plain Mucinex and nasal steroids. Reviewed statements recommending against NSAIDs first-line and risks and benefits of this class of meds anyhow including GI/renal/cardiac/pulmonary as well as the contraindications for these types of meds. Limitations of virtual visits reviewed. Role of being seen in person and emergency room reviewed. Discussed role of labs and imaging including plain film versus advanced. We did discuss the role of CTdefers, willing to have follow-up chest x-ray, see below     the risk of secondary bacterial infection reviewed. The risk and benefits of antibiotic therapy reviewed with various options reviewed. Currently on doxycycline with standard precautions including C. difficile, and photosensitivity. The R/B probiotic reviewed      We counseled on the role of systemic steroids, the potential risks and benefits of and various dosing options. Not interested, does not tolerate well  The role of and the potential risks and benefits of albuterol reviewed.   Has albuterol to use as needed

## 2021-02-26 ENCOUNTER — VIRTUAL VISIT (OUTPATIENT)
Dept: PRIMARY CARE CLINIC | Age: 46
End: 2021-02-26
Payer: COMMERCIAL

## 2021-02-26 DIAGNOSIS — U07.1 COVID-19: Primary | ICD-10-CM

## 2021-02-26 DIAGNOSIS — E11.65 TYPE 2 DIABETES MELLITUS WITH HYPERGLYCEMIA, WITHOUT LONG-TERM CURRENT USE OF INSULIN (HCC): ICD-10-CM

## 2021-02-26 DIAGNOSIS — J18.9 PNEUMONIA OF RIGHT LUNG DUE TO INFECTIOUS ORGANISM, UNSPECIFIED PART OF LUNG: ICD-10-CM

## 2021-02-26 DIAGNOSIS — F32.A DEPRESSION, UNSPECIFIED DEPRESSION TYPE: ICD-10-CM

## 2021-02-26 PROCEDURE — 99214 OFFICE O/P EST MOD 30 MIN: CPT | Performed by: FAMILY MEDICINE

## 2021-02-26 RX ORDER — AZITHROMYCIN 250 MG/1
TABLET, FILM COATED ORAL
Qty: 6 TABLET | Refills: 0 | Status: SHIPPED
Start: 2021-02-26 | End: 2021-04-26

## 2021-02-26 NOTE — PROGRESS NOTES
TeleMedicine Patient Consent    This visit was performed as a virtual video visit using a synchronous, two-way, audio-video telehealth technology platform. Patient identification was verified at the start of the visit, including the patient's telephone number and physical location. I discussed with the patient the nature of our telehealth visits, that:     1. Due to the nature of an audio- video modality, the only components of a physical exam that could be done are the elements supported by direct observation. 2. I would evaluate the patient and recommend diagnostics and treatments based on my assessment. 3. If it was felt that the patient should be evaluated in clinic or an emergency room setting, then they would be directed there. 4. Our sessions are not being recorded and that personal health information is protected. 5. Our team would provide follow up care in person if/when the patient needs it. Patient does agree to proceed with telemedicine consultation. Patient's location: home address in [facility]    Physician  location other address in [place]     Other people involved in call:   Spouse    This visit was completed virtually using Doxy. me    2021    TELEHEALTH EVALUATION -- Audio/Visual (During JBJSY-10 public health emergency)    Chief Complaint   Patient presents with    Positive For Covid-19     tested positive     Fever    Congestion           HPI:    Elbert Olivares (:  1975) has requested an audio/video evaluation for the following concern(s):    Developed sxs Sat , tested pos on . She is progressively improving. Still has abnormal smell and taste. Temp still 99.4. Pulse ox in the high 90s. Still short of breath but she does notice it to be improving. Still cough productive at times and significant nasal congestion. No nausea vomiting or diarrhea. Previous chest x-ray was read out as early groundglass opacities in the right mid and lower lung.         ROS: Const: Feels improved overall but still symptomatic  Eyes: Denies discharge, pain, redness and visual disturbance. ENMT: Still with nasal congestion, still abnormal smell taste CV: Denies chest discomfort, pain; diaphoresis, dizziness, edema, lightheadedness, orthopnea,  palpitations, syncope and near syncopal episode or any exertional symptoms  Resp: Denies   hemoptysis, pleuritic pain,   continued cough, productive at times improved wheezing and shortness of breath she states   GI: Denies abdominal pain,  hematochezia, melena, and vomiting. : Denies urinary symptoms including dysuria , urgency, frequency or hematuria. Musculo: Denies musculoskeletal symptoms. Skin: Denies bruising and rash. Neuro: Denies headache, numbness, stiff neck, tingling and focal weakness slurred speech or facial  droop  Hema/Lymph: Denies bleeding/bruising tendency and enlarged lymph nodes         Current Outpatient Medications:     VORTIoxetine (TRINTELLIX) 10 MG TABS tablet, Take 1 tablet by mouth daily, Disp: 30 tablet, Rfl: 0    azithromycin (ZITHROMAX) 250 MG tablet, 2 po qd  day 1 then 1 po qd  days 2 - 5, Disp: 6 tablet, Rfl: 0    albuterol sulfate  (90 Base) MCG/ACT inhaler, 1-2 puffs q4-6 hrs prn, Disp: 1 Inhaler, Rfl: 0    fluconazole (DIFLUCAN) 150 MG tablet, Take 1 po qd x 1 at onset of symptoms.  Repeat  x 1 after course of antibiotic, Disp: 2 tablet, Rfl: 0    Insulin Degludec (TRESIBA FLEXTOUCH) 100 UNIT/ML SOPN, Inject 15 Units into the skin daily, Disp: 5 pen, Rfl: 5    Insulin Pen Needle (BD PEN NEEDLE MICRO U/F) 32G X 6 MM MISC, Uses with insulin daily, Disp: 150 each, Rfl: 5    glipiZIDE (GLUCOTROL XL) 10 MG extended release tablet, TAKE 1 TABLET BY MOUTH TWICE A DAY, Disp: 180 tablet, Rfl: 5    chlorpheniramine (ALLER-CHLOR) 4 MG tablet, Take 1 tablet by mouth every 6 hours as needed for Allergies, Disp: 20 tablet, Rfl: 0   cetirizine (ZYRTEC) 10 MG tablet, Take 1 tablet by mouth daily, Disp: 30 tablet, Rfl: 1    levothyroxine (SYNTHROID) 75 MCG tablet, Take 1 tablet by mouth Daily, Disp: 90 tablet, Rfl: 3    rosuvastatin (CRESTOR) 10 MG tablet, Take 1 tablet by mouth daily, Disp: 30 tablet, Rfl: 3    Blood Glucose Monitoring Suppl MISC, Blood glucose meter - use as directed, Disp: 1 each, Rfl: 1    blood glucose monitor strips, Use to check blood sugar 3 times/day, Disp: 100 strip, Rfl: 5    Lancets MISC, Use to test blood sugar 3 times a day, Disp: 100 each, Rfl: 5    aspirin 81 MG EC tablet, Take 81 mg by mouth daily, Disp: , Rfl:     B Complex-C (SUPER B COMPLEX/VITAMIN C PO), Take by mouth daily, Disp: , Rfl:     loratadine (CLARITIN) 10 MG tablet, Take 10 mg by mouth daily, Disp: , Rfl:     metFORMIN (GLUCOPHAGE-XR) 500 MG extended release tablet, Take 2 tablets by mouth 2 times daily, Disp: 360 tablet, Rfl: 3    omeprazole (PRILOSEC) 20 MG delayed release capsule, Take 1 capsule by mouth daily, Disp: 90 capsule, Rfl: 3    Cholecalciferol (VITAMIN D3) 2000 units CAPS, Take 2,000 Units by mouth daily , Disp: , Rfl:   Allergies   Allergen Reactions    Celecoxib Hives       Past Medical History:   Diagnosis Date    Depression     Diabetes mellitus (HCC)     GERD (gastroesophageal reflux disease)     Joint pain     Thyroid disease      Past Surgical History:   Procedure Laterality Date     SECTION      CHOLECYSTECTOMY      HYSTERECTOMY       No family history on file.   Social History     Tobacco Use    Smoking status: Never Smoker    Smokeless tobacco: Never Used   Substance Use Topics    Alcohol use: No    Drug use: No     Social History     Social History Narrative    PMH:    Problem List: Eruption, Infestation by Sarcoptes scabiei sherly hominis, Dysthymic disorder, Peptic reflux    disease, Dysthymia, Hypothyroidism, Adult health examination        Health Maintenance:    Mammogram - (10/1/2018) Mammogram Screening - (10/1/2018)    Influenza Vaccination - (9/1/2015)        Medical Problems:    vitamin D def, Asthma    Hypothyroidism - partial thyroidectomy (half of left lobe); benign nodule    Anxiety    GERD - EGD 2011    Seasonal Allergies, Type 2 Diabetes        Surgical Hx:    C/S    Partial Hysterectomy - left ovary remains (bleeding issues)    Benita        FH:    Father:    . (Hx)    Mother:    . (Hx)    Mom - thyroid cancer, melanoma    Dad - depression    cousin - melanoma        SH:    . (Marital)Stay at home;    3 kids (8,7,5 as of 2014). No complications during pregnancy. Krunal Sanz ; works at [employer]    Personal Habits: Cigarette Use: Nonsmoker. Alcohol: Denies alcohol use. PHYSICAL EXAMINATION:  [ INSTRUCTIONS:  \"[x]\" Indicates a positive item  \"[]\" Indicates a negative item  -- DELETE ALL ITEMS NOT EXAMINED]  Vital Signs: (As obtained by patient/caregiver or practitioner observation)    There were no vitals filed for this visit. Blood pressure-  Heart rate-    Respiratory rate-    Temperature-  Pulse oximetry-     Constitutional: [x] Appears well-developed and well-nourished [x] No apparent distress      [] Abnormal-   Mental status  [x] Alert and awake  [x] Oriented to person/place/time [x]Able to follow commands      Eyes:  EOM    [x]  Normal  [] Abnormal-  Sclera  [x]  Normal  [] Abnormal -         Discharge [x]  None visible  [] Abnormal -    HENT:   [x] Normocephalic, atraumatic.   [] Abnormal   [x] Mouth/Throat: Mucous membranes are moist.     External Ears [x] Normal  [] Abnormal-     Neck: [x] No visualized mass     Pulmonary/Chest: [x] Respiratory effort normal.  [x] No visualized signs of difficulty breathing or respiratory distress        [] Abnormal-      Musculoskeletal:   [x] Normal gait with no signs of ataxia         [x] Normal range of motion of neck        [] Abnormal- Neurological:        [x] No Facial Asymmetry (Cranial nerve 7 motor function) (limited exam to video visit)          [x] No gaze palsy        [] Abnormal-         Skin:        [x] No significant exanthematous lesions or discoloration noted on facial skin         [] Abnormal-            Psychiatric:       [x] Normal Affect [x] No Hallucinations        [] Abnormal-     Other pertinent observable physical exam findings-          Diagnosis Orders   1. COVID-19  azithromycin (ZITHROMAX) 250 MG tablet   2. Pneumonia of right lung due to infectious organism, unspecified part of lung  azithromycin (ZITHROMAX) 250 MG tablet   3. Type 2 diabetes mellitus with hyperglycemia, without long-term current use of insulin ([facility] [address] [address]     4. Depression, unspecified depression type  VORTIoxetine (TRINTELLIX) 10 MG TABS tablet       1. COVID-19  Still symptomatic although slowly improving. See below. 2. Pneumonia of right lung due to infectious organism, unspecified part of lung  Most likely secondary to #1, noted on chest x-ray 2/10/2021    3. Type 2 diabetes mellitus with hyperglycemia, without long-term current use of insulin ([facility])  Sugars elevated, she increased insulin, continue per endocrinology, increasing risk of Covid    4. Depression  Stable. Currently on Trintellix. Potential interactions reviewed. No problem-specific Assessment & Plan notes found for this encounter. Positive Covid, tested +2/2, symptoms since January 30. Counseled extensively. Potential comorbidities that may increase risk reviewed. Standard precautions reviewed. Quarantine recommendations reviewed. There was shared decision making throughout the process. Counseled on the  risk and benefits of, and literature regarding the use of various over-the-counter products including (at appropriate doses and dosing intervals if no contraindication) Tylenol, zinc, vitamin C, probiotics etc.  Also counseled on appropriate hydration, potential role of prone position, use of nasal saline, coolmist vaporizer, honey, plain Mucinex and nasal steroids. Reviewed statements recommending against NSAIDs first-line and risks and benefits of this class of meds anyhow including GI/renal/cardiac/pulmonary as well as the contraindications for these types of meds. Limitations of virtual visits reviewed. Role of being seen in person and emergency room reviewed. Discussed role of labs and imaging including plain film versus advanced. We did discuss the role of CTdefers, willing to have follow-up chest x-ray, see below     the risk of secondary bacterial infection reviewed. The risk and benefits of antibiotic therapy reviewed with various options reviewed. Previously on doxycycline with standard precautions including C. difficile, and photosensitivity. Start Z-Ruiz with precautions including interactions, prolonged QT, C. difficile. The R/B probiotic reviewed      We counseled on the role of systemic steroids, the potential risks and benefits of and various dosing options. Not interested, does not tolerate well  The role of and the potential risks and benefits of albuterol reviewed.   Has albuterol to use as needed The unfortunate uncertainties and unpredictable nature of this virus reviewed. Various potential outcomes reviewed including complete recovery, debilitating sequelae which can be permanent as well as fatality. Certain aspects of this virus including the potential thrombotic risk can cause sudden debilitating/fatal events, without warning. Therefore signs and symptoms to watch for reviewed at length, with very low threshold for reevaluation if needed and presentation to the emergency room immediately if any serious signs or symptoms which were reviewed at length, including the persistence or worsening of any symptoms mentioned above. The risk of postponing this reviewed. The patient verbalizes complete understanding, verbalizes understanding of various options and agrees to proceed as above. Counseled extensively and differential diagnoses of above were reviewed, including serious etiologies. Side effects and interactions of medications were reviewed. Plan as above:  After discussion and shared decision-making, she would like to proceed as above. In summary start Z-Ruiz. Check chest x-ray. Proper hydration. Other measures as above. As long as symptoms steadily improved/resolved follow-up next week sooner as needed. Low threshold for in person evaluation    As long as symptoms steadily improve/resolve and medical conditions are following the expected course, FU as below, sooner PRN      No follow-ups on file.       Time spent: Greater than Not billed by time

## 2021-03-15 ENCOUNTER — OFFICE VISIT (OUTPATIENT)
Dept: ENDOCRINOLOGY | Age: 46
End: 2021-03-15
Payer: COMMERCIAL

## 2021-03-15 VITALS
DIASTOLIC BLOOD PRESSURE: 78 MMHG | BODY MASS INDEX: 37.94 KG/M2 | SYSTOLIC BLOOD PRESSURE: 120 MMHG | OXYGEN SATURATION: 97 % | WEIGHT: 264.4 LBS | TEMPERATURE: 98.7 F | HEART RATE: 89 BPM

## 2021-03-15 DIAGNOSIS — E03.9 HYPOTHYROIDISM, UNSPECIFIED TYPE: ICD-10-CM

## 2021-03-15 DIAGNOSIS — E55.9 VITAMIN D DEFICIENCY: ICD-10-CM

## 2021-03-15 DIAGNOSIS — E11.9 TYPE 2 DIABETES MELLITUS WITHOUT COMPLICATION, WITHOUT LONG-TERM CURRENT USE OF INSULIN (HCC): Primary | ICD-10-CM

## 2021-03-15 PROCEDURE — 99214 OFFICE O/P EST MOD 30 MIN: CPT | Performed by: INTERNAL MEDICINE

## 2021-03-15 NOTE — PROGRESS NOTES
Thyroid nodule   Thyroid US 10/2018: Left lobe surgically absent  Rt lobe measures 5.4 cm --> 3 mm cyst     Elbert Olivares denies any new lumps, bumps in the neck, voice change, or shortness of breath. No family history of thyroid cancer. No prior history of radiation to head or neck region. PAST MEDICAL HISTORY   Past Medical History:   Diagnosis Date    Depression     Diabetes mellitus ([address]     GERD (gastroesophageal reflux disease)     Joint pain     Thyroid disease        PAST SURGICAL HISTORY   Past Surgical History:   Procedure Laterality Date     SECTION      CHOLECYSTECTOMY      HYSTERECTOMY         SOCIAL HISTORY   Tobacco:   reports that she has never smoked. She has never used smokeless tobacco.  Alcohol:   reports no history of alcohol use. Drugs:   reports no history of drug use. FAMILY HISTORY   No family history on file. ALLERGIES AND DRUG REACTIONS   Allergies   Allergen Reactions    Celecoxib Hives       CURRENT MEDICATIONS   Current Outpatient Medications   Medication Sig Dispense Refill    VORTIoxetine (TRINTELLIX) 10 MG TABS tablet Take 1 tablet by mouth daily 30 tablet 0    azithromycin (ZITHROMAX) 250 MG tablet 2 po qd  day 1 then 1 po qd  days 2 - 5 6 tablet 0    albuterol sulfate  (90 Base) MCG/ACT inhaler 1-2 puffs q4-6 hrs prn 1 Inhaler 0    fluconazole (DIFLUCAN) 150 MG tablet Take 1 po qd x 1 at onset of symptoms.  Repeat  x 1 after course of antibiotic 2 tablet 0    Insulin Degludec (TRESIBA FLEXTOUCH) 100 UNIT/ML SOPN Inject 15 Units into the skin daily 5 pen 5    Insulin Pen Needle (BD PEN NEEDLE MICRO U/F) 32G X 6 MM MISC Uses with insulin daily 150 each 5    glipiZIDE (GLUCOTROL XL) 10 MG extended release tablet TAKE 1 TABLET BY MOUTH TWICE A  tablet 5    chlorpheniramine (ALLER-CHLOR) 4 MG tablet Take 1 tablet by mouth every 6 hours as needed for Allergies 20 tablet 0    cetirizine (ZYRTEC) 10 MG tablet Take 1 tablet by mouth kg)   02/10/21 250 lb (113.4 kg)   12/08/20 250 lb (113.4 kg)   11/30/20 254 lb (115.2 kg)   11/19/20 256 lb (116.1 kg)   10/02/20 250 lb (113.4 kg)     Physical examination:  General: awake alert, oriented x3  HEENT: normocephalic non traumatic, no exophthalmos   Neck: supple, thyroid tenderness,  Pulm: Clear equal air entry no added sounds  CVS: S1 + S2  Abd: soft lax, no tenderness  Skin: warm, no lesions, no rash.  No open wounds, no ulcers   Neuro: CN intact, muscle power normal  Psych: normal mood, and affect    Review of Laboratory Data:  I personally reviewed the following lab:  Lab Results   Component Value Date/Time    WBC 8.6 02/10/2021 10:38 PM    RBC 5.27 02/10/2021 10:38 PM    HGB 12.9 02/10/2021 10:38 PM    HCT 40.4 02/10/2021 10:38 PM    MCV 76.7 (L) 02/10/2021 10:38 PM    MCH 24.5 (L) 02/10/2021 10:38 PM    MCHC 31.9 (L) 02/10/2021 10:38 PM    RDW 16.1 (H) 02/10/2021 10:38 PM     02/10/2021 10:38 PM    MPV 9.4 02/10/2021 10:38 PM      Lab Results   Component Value Date/Time     02/10/2021 10:38 PM    K 3.8 02/10/2021 10:38 PM    CO2 24 02/10/2021 10:38 PM    BUN 9 02/10/2021 10:38 PM    CREATININE 0.6 02/10/2021 10:38 PM    CALCIUM 8.9 02/10/2021 10:38 PM    LABGLOM >60 02/10/2021 10:38 PM    GFRAA >60 02/10/2021 10:38 PM      Lab Results   Component Value Date/Time    TSH 1.250 01/14/2021 11:20 AM    T4FREE 1.31 01/14/2021 11:20 AM     Lab Results   Component Value Date    LABA1C 10.7 01/14/2021    GLUCOSE 202 02/10/2021    MALBCR - 01/14/2021    LABMICR <12.0 01/14/2021    LABCREA 56 01/14/2021     Lab Results   Component Value Date    LABA1C 10.7 01/14/2021    LABA1C 9.3 09/01/2020    LABA1C 8.9 05/28/2020     Lab Results   Component Value Date    TRIG 124 01/14/2021    HDL 53 01/14/2021    LDLCALC 62 01/14/2021    CHOL 140 01/14/2021     Lab Results   Component Value Date    VITD25 23 01/14/2021     ASSESSMENT & RECOMMENDATIONS   Elbert Olivares, a 41 y.o.-old female seen in for the following issues     Diabetes Mellitus Type 2     · Pt's DM is uncontrolled   · Change DM regimen to Metformin 1000 mg BID,Glipizide Er 10 mg BID, Tresiba 20U daily, Fiasp 5U with meals + ss 1:50>150   · Wasn't ion to tolerate GLP-1 agonist, DPP-4 inhibitors in the past   · The patient was advised to check blood sugars 2-3  times a day before meals and at bedtime and send BS readings to our office in a week. · Discussed with patient A1c and blood sugar goals   · Patient will need routine diabetes maintenance and prevention  · Diabetes labs before next visit     Dietary noncompliance   Discussed with patient the importance of eating consistent carbohydrate meals, avoiding high glycemic index food. Also, discussed with patient the risk and negative consequences of dietary noncompliance on blood glucose control, blood pressure and weight    Hypothyroidism   · Currently on evothyroxine 75 mcg daily    MNG   · S/p partial Left lobectomy long time ago  · US 7/2020 - small Rt side nodule measuring 9 mm, no suspicious features   · Will follow with intermittent US     I personally reviewed external notes from PCP and other patient's care team providers, and personally interpreted labs associated with the above diagnosis. I also ordered labs to further assess and manage the above addressed medical conditions    Return in about 6 weeks (around 4/26/2021) for DM type 2. The above issues were reviewed with the patient who understood and agreed with the plan. Thank you for allowing us to participate in the care of this patient. Please do not hesitate to contact us with any additional questions. Diagnosis Orders   1. Type 2 diabetes mellitus without complication, without long-term current use of insulin (HCC)  Basic Metabolic Panel    Hemoglobin A1C    Lipid Panel    Vitamin D 25 Hydroxy   2. Vitamin D deficiency     3.  Hypothyroidism, unspecified type  TSH without Reflex    FREE T4     Alex Calle MD  Endocrinologist, [id number]   [address] [place] [id number]   Phone: [phone]  Fax: [phone]  --------------------------------------------  An electronic signature was used to authenticate this note.  Ivania Bautista MD on 3/15/2021 at 9:44 PM

## 2021-03-31 ENCOUNTER — TELEPHONE (OUTPATIENT)
Dept: ENDOCRINOLOGY | Age: 46
End: 2021-03-31

## 2021-04-16 DIAGNOSIS — E11.9 TYPE 2 DIABETES MELLITUS WITHOUT COMPLICATION, WITHOUT LONG-TERM CURRENT USE OF INSULIN (HCC): Primary | ICD-10-CM

## 2021-04-16 RX ORDER — INSULIN ASPART INJECTION 100 [IU]/ML
INJECTION, SOLUTION SUBCUTANEOUS
Qty: 15 PEN | Refills: 3 | Status: SHIPPED
Start: 2021-04-16 | End: 2021-04-26 | Stop reason: SDUPTHER

## 2021-04-24 ENCOUNTER — HOSPITAL ENCOUNTER (OUTPATIENT)
Age: 46
Discharge: HOME | End: 2021-04-24
Payer: COMMERCIAL

## 2021-04-24 DIAGNOSIS — E78.2 MIXED HYPERLIPIDEMIA: ICD-10-CM

## 2021-04-24 DIAGNOSIS — E11.9 TYPE 2 DIABETES MELLITUS WITHOUT COMPLICATION, WITHOUT LONG-TERM CURRENT USE OF INSULIN (HCC): ICD-10-CM

## 2021-04-24 LAB
ANION GAP SERPL CALCULATED.3IONS-SCNC: 9 MMOL/L (ref 7–16)
BUN BLDV-MCNC: 12 MG/DL (ref 6–20)
CALCIUM SERPL-MCNC: 9 MG/DL (ref 8.6–10.2)
CHLORIDE BLD-SCNC: 104 MMOL/L (ref 98–107)
CHOLESTEROL, TOTAL: 132 MG/DL (ref 0–199)
CO2: 23 MMOL/L (ref 22–29)
CREAT SERPL-MCNC: 0.6 MG/DL (ref 0.5–1)
GFR AFRICAN AMERICAN: >60
GFR NON-AFRICAN AMERICAN: >60 ML/MIN/1.73
GLUCOSE BLD-MCNC: 134 MG/DL (ref 74–99)
HBA1C MFR BLD: 8.6 % (ref 4–5.6)
HDLC SERPL-MCNC: 60 MG/DL
LDL CHOLESTEROL CALCULATED: 54 MG/DL (ref 0–99)
POTASSIUM SERPL-SCNC: 3.9 MMOL/L (ref 3.5–5)
SODIUM BLD-SCNC: 136 MMOL/L (ref 132–146)
TRIGL SERPL-MCNC: 92 MG/DL (ref 0–149)
VITAMIN D 25-HYDROXY: 40 NG/ML (ref 30–100)
VLDLC SERPL CALC-MCNC: 18 MG/DL

## 2021-04-24 PROCEDURE — 80061 LIPID PANEL: CPT

## 2021-04-24 PROCEDURE — 82306 VITAMIN D 25 HYDROXY: CPT

## 2021-04-24 PROCEDURE — 83036 HEMOGLOBIN GLYCOSYLATED A1C: CPT

## 2021-04-24 PROCEDURE — 80048 BASIC METABOLIC PNL TOTAL CA: CPT

## 2021-04-24 PROCEDURE — 36415 COLL VENOUS BLD VENIPUNCTURE: CPT

## 2021-04-26 ENCOUNTER — OFFICE VISIT (OUTPATIENT)
Dept: ENDOCRINOLOGY | Age: 46
End: 2021-04-26
Payer: COMMERCIAL

## 2021-04-26 ENCOUNTER — TELEPHONE (OUTPATIENT)
Dept: ENDOCRINOLOGY | Age: 46
End: 2021-04-26

## 2021-04-26 VITALS
OXYGEN SATURATION: 98 % | SYSTOLIC BLOOD PRESSURE: 124 MMHG | HEART RATE: 74 BPM | WEIGHT: 260 LBS | DIASTOLIC BLOOD PRESSURE: 72 MMHG | BODY MASS INDEX: 37.31 KG/M2 | RESPIRATION RATE: 18 BRPM

## 2021-04-26 DIAGNOSIS — E11.65 TYPE 2 DIABETES MELLITUS WITH HYPERGLYCEMIA, WITHOUT LONG-TERM CURRENT USE OF INSULIN (HCC): ICD-10-CM

## 2021-04-26 DIAGNOSIS — E04.1 THYROID NODULE: ICD-10-CM

## 2021-04-26 DIAGNOSIS — E11.9 TYPE 2 DIABETES MELLITUS WITHOUT COMPLICATION, WITHOUT LONG-TERM CURRENT USE OF INSULIN (HCC): Primary | ICD-10-CM

## 2021-04-26 DIAGNOSIS — E55.9 VITAMIN D DEFICIENCY: ICD-10-CM

## 2021-04-26 DIAGNOSIS — E03.9 HYPOTHYROIDISM, UNSPECIFIED TYPE: ICD-10-CM

## 2021-04-26 PROCEDURE — 99214 OFFICE O/P EST MOD 30 MIN: CPT | Performed by: INTERNAL MEDICINE

## 2021-04-26 PROCEDURE — 3052F HG A1C>EQUAL 8.0%<EQUAL 9.0%: CPT | Performed by: INTERNAL MEDICINE

## 2021-04-26 RX ORDER — FLASH GLUCOSE SCANNING READER
1 EACH MISCELLANEOUS ONCE
Qty: 1 EACH | Refills: 0 | Status: SHIPPED | OUTPATIENT
Start: 2021-04-26 | End: 2021-04-26

## 2021-04-26 RX ORDER — LEVOTHYROXINE SODIUM 0.07 MG/1
75 TABLET ORAL DAILY
Qty: 90 TABLET | Refills: 3 | Status: SHIPPED
Start: 2021-04-26 | End: 2022-04-04

## 2021-04-26 RX ORDER — METFORMIN HYDROCHLORIDE 500 MG/1
1000 TABLET, EXTENDED RELEASE ORAL 2 TIMES DAILY
Qty: 360 TABLET | Refills: 3 | Status: SHIPPED
Start: 2021-04-26 | End: 2022-04-04

## 2021-04-26 RX ORDER — PEN NEEDLE, DIABETIC 32 GX 1/4"
NEEDLE, DISPOSABLE MISCELLANEOUS
Qty: 150 EACH | Refills: 5 | Status: SHIPPED
Start: 2021-04-26 | End: 2021-10-11 | Stop reason: SDUPTHER

## 2021-04-26 RX ORDER — FLASH GLUCOSE SENSOR
KIT MISCELLANEOUS
Qty: 6 EACH | Refills: 3 | Status: SHIPPED | OUTPATIENT
Start: 2021-04-26 | End: 2021-05-04 | Stop reason: SDUPTHER

## 2021-04-26 RX ORDER — INSULIN ASPART INJECTION 100 [IU]/ML
INJECTION, SOLUTION SUBCUTANEOUS
Qty: 15 PEN | Refills: 3
Start: 2021-04-26 | End: 2021-10-11 | Stop reason: SDUPTHER

## 2021-04-26 RX ORDER — GLIPIZIDE 10 MG/1
TABLET, FILM COATED, EXTENDED RELEASE ORAL
Qty: 180 TABLET | Refills: 5 | Status: SHIPPED
Start: 2021-04-26 | End: 2022-07-07

## 2021-04-26 RX ORDER — INSULIN DEGLUDEC INJECTION 100 U/ML
24 INJECTION, SOLUTION SUBCUTANEOUS DAILY
Qty: 25 PEN | Refills: 5 | Status: SHIPPED
Start: 2021-04-26 | End: 2021-07-21 | Stop reason: SDUPTHER

## 2021-04-26 NOTE — PROGRESS NOTES
[address] [place] [address] Department of Endocrinology Diabetes and Metabolism   [address] [facility] [address]   Phone: [phone]  Fax: [phone]    Date of Service: 4/26/2021  Primary Care Physician: Jose Avila MD  Provider: Pastor Muniz MD     Reason for the visit:  DM type 2, Hypothyroidism, MNG     History of Present Illness: The history is provided by the patient. No  was used. Accuracy of the patient data is excellent. Yesenia Sandoval is a very pleasant 41 y.o. female seen today for diabetes management     Yesenia Sandoval was diagnosed with diabetes at age 46 and currently on Metformin er 500 mg bid, Tresiba 20U nightly, Fiasp 5 units with meals + ss 1:50>150  The patient has been checking blood sugar 4 times a day with meals and at night and readings are improving   Most recent A1c results summarized below  Lab Results   Component Value Date    LABA1C 8.6 04/24/2021    LABA1C 10.7 01/14/2021    LABA1C 9.3 09/01/2020     Patient has had no hypoglycemic episodes   The patient has been mindful of what has been eating and was following diabetes diet as encouraged   I reviewed current medications and the patient has no issues with diabetes medications  Yesenia Sandoval is up to date with eye exam and denied any history of diabetic retinopathy   The patient performs  own feet care  Microvascular complications:  No Retinopathy, Nephropathy or Neuropathy   Macrovascular complications: no CAD, PVD, or Stroke  The patient refuses Flushot and pneumonia vaccine     Hypothyroidism   H/o partial thyroidectomy long time ago for MNG. Per pt pathology was benign   She is currently on Levothyhroxine 75 mcg dailty. Patient takes levothyroxine in the morning at empty stomach, wait one hour before eating , avoid multivitamins containing calcium  or iron with it.   Lab Results   Component Value Date/Time    TSH 1.250 01/14/2021 11:20 AM    T4FREE 1.31 01/14/2021 11:20 AM VORTIoxetine (TRINTELLIX) 10 MG TABS tablet Take 1 tablet by mouth daily 30 tablet 0    albuterol sulfate  (90 Base) MCG/ACT inhaler 1-2 puffs q4-6 hrs prn 1 Inhaler 0    fluconazole (DIFLUCAN) 150 MG tablet Take 1 po qd x 1 at onset of symptoms. Repeat  x 1 after course of antibiotic 2 tablet 0    chlorpheniramine (ALLER-CHLOR) 4 MG tablet Take 1 tablet by mouth every 6 hours as needed for Allergies 20 tablet 0    cetirizine (ZYRTEC) 10 MG tablet Take 1 tablet by mouth daily 30 tablet 1    rosuvastatin (CRESTOR) 10 MG tablet Take 1 tablet by mouth daily 30 tablet 3    Blood Glucose Monitoring Suppl MISC Blood glucose meter - use as directed 1 each 1    blood glucose monitor strips Use to check blood sugar 3 times/day 100 strip 5    Lancets MISC Use to test blood sugar 3 times a day 100 each 5    aspirin 81 MG EC tablet Take 81 mg by mouth daily      B Complex-C (SUPER B COMPLEX/VITAMIN C PO) Take by mouth daily      loratadine (CLARITIN) 10 MG tablet Take 10 mg by mouth daily      omeprazole (PRILOSEC) 20 MG delayed release capsule Take 1 capsule by mouth daily 90 capsule 3    Cholecalciferol (VITAMIN D3) 2000 units CAPS Take 2,000 Units by mouth daily        No current facility-administered medications for this visit. Review of Systems  Constitutional: No fever, no chills, no diaphoresis, no generalized weakness. HEENT: No blurred vision, No sore throat, no ear pain, no hair loss  Neck: denied any neck swelling, difficulty swallowing,   Cardio-pulmonary: No CP, SOB or palpitation, No orthopnea or PND. No cough or wheezing. GI: No N/V/D, no constipation, No abdominal pain, no melena or hematochezia   : Denied any dysuria, hematuria, flank pain, discharge, or incontinence. Skin: denied any rash, ulcer, Hirsute, or hyperpigmentation. MSK: denied any joint deformity, joint pain/swelling, muscle pain, or back pain.   Neuro: no numbness, no tingling, no weakness, _    OBJECTIVE    BP 124/72   Pulse 74   Resp 18   Wt 260 lb (117.9 kg)   SpO2 98%   BMI 37.31 kg/m²   BP Readings from Last 4 Encounters:   04/26/21 124/72   03/15/21 120/78   02/11/21 134/70   12/08/20 124/80     Wt Readings from Last 6 Encounters:   04/26/21 260 lb (117.9 kg)   03/15/21 264 lb 6.4 oz (119.9 kg)   02/10/21 250 lb (113.4 kg)   12/08/20 250 lb (113.4 kg)   11/30/20 254 lb (115.2 kg)   11/19/20 256 lb (116.1 kg)     Physical examination:  General: awake alert, oriented x3  HEENT: normocephalic non traumatic, no exophthalmos   Neck: supple, thyroid tenderness,  Pulm: Clear equal air entry no added sounds  CVS: S1 + S2  Abd: soft lax, no tenderness  Skin: warm, no lesions, no rash.  No open wounds, no ulcers   Neuro: CN intact, muscle power normal  Psych: normal mood, and affect    Review of Laboratory Data:  I personally reviewed the following lab:  Lab Results   Component Value Date/Time    WBC 8.6 02/10/2021 10:38 PM    RBC 5.27 02/10/2021 10:38 PM    HGB 12.9 02/10/2021 10:38 PM    HCT 40.4 02/10/2021 10:38 PM    MCV 76.7 (L) 02/10/2021 10:38 PM    MCH 24.5 (L) 02/10/2021 10:38 PM    MCHC 31.9 (L) 02/10/2021 10:38 PM    RDW 16.1 (H) 02/10/2021 10:38 PM     02/10/2021 10:38 PM    MPV 9.4 02/10/2021 10:38 PM      Lab Results   Component Value Date/Time     04/24/2021 12:20 PM    K 3.9 04/24/2021 12:20 PM    K 3.8 02/10/2021 10:38 PM    CO2 23 04/24/2021 12:20 PM    BUN 12 04/24/2021 12:20 PM    CREATININE 0.6 04/24/2021 12:20 PM    CALCIUM 9.0 04/24/2021 12:20 PM    LABGLOM >60 04/24/2021 12:20 PM    GFRAA >60 04/24/2021 12:20 PM      Lab Results   Component Value Date/Time    TSH 1.250 01/14/2021 11:20 AM    T4FREE 1.31 01/14/2021 11:20 AM     Lab Results   Component Value Date    LABA1C 8.6 04/24/2021    GLUCOSE 134 04/24/2021    MALBCR - 01/14/2021    LABMICR <12.0 01/14/2021    LABCREA 56 01/14/2021     Lab Results   Component Value Date    LABA1C 8.6 04/24/2021    LABA1C 10.7 01/14/2021    LABA1C 9.3 09/01/2020     Lab Results   Component Value Date    TRIG 92 04/24/2021    HDL 60 04/24/2021    LDLCALC 54 04/24/2021    CHOL 132 04/24/2021     Lab Results   Component Value Date    VITD25 40 04/24/2021    VITD25 23 01/14/2021     ASSESSMENT & RECOMMENDATIONS   Elbert Olivares, a 41 y.o.-old female seen in for the following issues     Diabetes Mellitus Type 2     · Improving control   · Change DM regimen to metformin er 500 mg bid, Tresiba 24 U daily, Fiasp 7U with meals + ss 7:50>150   · Wasn't ion to tolerate GLP-1 agonist, DPP-4 inhibitors in the past   · The patient was advised to continue to check blood sugars 4  times a day before meals and at bedtime and send BS readings to our office in a week. · Ordered freestyle Macy system   · Discussed with patient A1c and blood sugar goals   · Patient will need routine diabetes maintenance and prevention  · Diabetes labs before next visit     Dietary noncompliance   Discussed with patient the importance of eating consistent carbohydrate meals, avoiding high glycemic index food. Also, discussed with patient the risk and negative consequences of dietary noncompliance on blood glucose control, blood pressure and weight    Hypothyroidism   · Continue evothyroxine 75 mcg daily    MNG   · S/p partial Left lobectomy long time ago  · US 7/2020 - small Rt side nodule measuring 9 mm, no suspicious features   · Will follow with intermittent US     I personally reviewed external notes from PCP and other patient's care team providers, and personally interpreted labs associated with the above diagnosis. I also ordered labs to further assess and manage the above addressed medical conditions    Return in about 4 months (around 8/26/2021) for DM type 2. The above issues were reviewed with the patient who understood and agreed with the plan. Thank you for allowing us to participate in the care of this patient.  Please do not hesitate to contact us with any additional questions. Diagnosis Orders   1. Type 2 diabetes mellitus without complication, without long-term current use of insulin (HCC)  Insulin Pen Needle (BD PEN NEEDLE MICRO U/F) 32G X 6 MM MISC    Insulin Degludec (TRESIBA FLEXTOUCH) 100 UNIT/ML SOPN    Insulin Aspart, w/Niacinamide, (FIASP FLEXTOUCH) 100 UNIT/ML SOPN    glipiZIDE (GLUCOTROL XL) 10 MG extended release tablet    Hemoglobin A1C   2. Vitamin D deficiency     3. Hypothyroidism, unspecified type  levothyroxine (SYNTHROID) 75 MCG tablet   4. Thyroid nodule  levothyroxine (SYNTHROID) 75 MCG tablet   5. Type 2 diabetes mellitus with hyperglycemia, without long-term current use of insulin (HCC)  metFORMIN (GLUCOPHAGE-XR) 500 MG extended release tablet    Continuous Blood Gluc  (FREESTYLE KM 2 READER) JACQUI    Continuous Blood Gluc Sensor (FREESTYLE KM 2 SENSOR) Candace Peters MD  Endocrinologist, [facility] Diabetes Care and Endocrinology   [address] [place] [id number]   Phone: [phone]  Fax: [phone]  --------------------------------------------  An electronic signature was used to authenticate this note.  Tenzin Thompson MD on 4/26/2021 at 10:21 PM

## 2021-05-04 ENCOUNTER — OFFICE VISIT (OUTPATIENT)
Dept: FAMILY MEDICINE CLINIC | Age: 46
End: 2021-05-04
Payer: COMMERCIAL

## 2021-05-04 ENCOUNTER — TELEPHONE (OUTPATIENT)
Dept: ENDOCRINOLOGY | Age: 46
End: 2021-05-04

## 2021-05-04 VITALS
BODY MASS INDEX: 37.65 KG/M2 | TEMPERATURE: 98 F | DIASTOLIC BLOOD PRESSURE: 84 MMHG | HEIGHT: 70 IN | WEIGHT: 263 LBS | SYSTOLIC BLOOD PRESSURE: 128 MMHG | HEART RATE: 87 BPM | OXYGEN SATURATION: 98 %

## 2021-05-04 DIAGNOSIS — J01.90 ACUTE NON-RECURRENT SINUSITIS, UNSPECIFIED LOCATION: ICD-10-CM

## 2021-05-04 DIAGNOSIS — J06.9 ACUTE UPPER RESPIRATORY INFECTION, UNSPECIFIED: Primary | ICD-10-CM

## 2021-05-04 DIAGNOSIS — R09.81 NASAL CONGESTION: ICD-10-CM

## 2021-05-04 DIAGNOSIS — R05.9 COUGH: ICD-10-CM

## 2021-05-04 DIAGNOSIS — E11.65 TYPE 2 DIABETES MELLITUS WITH HYPERGLYCEMIA, WITHOUT LONG-TERM CURRENT USE OF INSULIN (HCC): ICD-10-CM

## 2021-05-04 LAB
INFLUENZA A ANTIBODY: NEGATIVE
INFLUENZA B ANTIBODY: NEGATIVE

## 2021-05-04 PROCEDURE — 99214 OFFICE O/P EST MOD 30 MIN: CPT | Performed by: PHYSICIAN ASSISTANT

## 2021-05-04 PROCEDURE — 87804 INFLUENZA ASSAY W/OPTIC: CPT | Performed by: PHYSICIAN ASSISTANT

## 2021-05-04 PROCEDURE — 96372 THER/PROPH/DIAG INJ SC/IM: CPT | Performed by: PHYSICIAN ASSISTANT

## 2021-05-04 RX ORDER — METHYLPREDNISOLONE ACETATE 40 MG/ML
40 INJECTION, SUSPENSION INTRA-ARTICULAR; INTRALESIONAL; INTRAMUSCULAR; SOFT TISSUE ONCE
Status: COMPLETED | OUTPATIENT
Start: 2021-05-04 | End: 2021-05-04

## 2021-05-04 RX ORDER — BENZONATATE 100 MG/1
100 CAPSULE ORAL 3 TIMES DAILY PRN
Qty: 21 CAPSULE | Refills: 0 | Status: SHIPPED | OUTPATIENT
Start: 2021-05-04 | End: 2021-05-11

## 2021-05-04 RX ORDER — CEFDINIR 300 MG/1
300 CAPSULE ORAL 2 TIMES DAILY
Qty: 20 CAPSULE | Refills: 0 | Status: SHIPPED | OUTPATIENT
Start: 2021-05-04 | End: 2021-05-14

## 2021-05-04 RX ORDER — FLASH GLUCOSE SENSOR
KIT MISCELLANEOUS
Qty: 6 EACH | Refills: 3 | Status: SHIPPED
Start: 2021-05-04 | End: 2021-07-12 | Stop reason: CLARIF

## 2021-05-04 RX ORDER — CHLORPHENIRAMINE MALEATE 4 MG/1
4 TABLET ORAL EVERY 6 HOURS PRN
Qty: 20 TABLET | Refills: 0 | Status: SHIPPED
Start: 2021-05-04 | End: 2022-10-25

## 2021-05-04 RX ADMIN — METHYLPREDNISOLONE ACETATE 40 MG: 40 INJECTION, SUSPENSION INTRA-ARTICULAR; INTRALESIONAL; INTRAMUSCULAR; SOFT TISSUE at 11:37

## 2021-05-04 NOTE — PROGRESS NOTES
21  Margarito Boland : 1975 Sex: female  Age 41 y.o. Subjective:  Chief Complaint   Patient presents with    Cough     Started Saturday. Dayquil/nightquil    Nasal Congestion    Itchy Eye     watery eye. HPI:   Margarito Boland , 41 y.o. female presents to express care for evaluation of cough, congestion, drainage. The patient started with the symptoms essentially on Saturday. The patient has been using some DayQuil and NyQuil. The patient has had some itching eyes as well as sneezing. Thought it may be related to allergies. She does not note that it seems to be improving. The patient was concerned because it does seem to be moving into her chest.  She did have Covid back in February. The patient has not had the Covid vaccines yet. The patient has not any back pain, flank pain. No hemoptysis. The patient is eating and drinking normally. Not currently on any antibiotics. ROS:   Unless otherwise stated in this report the patient's positive and negative responses for review of systems for constitutional, eyes, ENT, cardiovascular, respiratory, gastrointestinal, neurological, , musculoskeletal, and integument systems and related systems to the presenting problem are either stated in the history of present illness or were not pertinent or were negative for the symptoms and/or complaints related to the presenting medical problem. Positives and pertinent negatives as per HPI. All others reviewed and are negative. PMH:     Past Medical History:   Diagnosis Date    Depression     Diabetes mellitus ([facility] [address]     GERD (gastroesophageal reflux disease)     Joint pain     Thyroid disease        Past Surgical History:   Procedure Laterality Date     SECTION      CHOLECYSTECTOMY      HYSTERECTOMY         History reviewed. No pertinent family history.     Medications:     Current Outpatient Medications:     cefdinir (OMNICEF) 300 MG capsule, Take 1 capsule by mouth 2 times daily for 10 days, Disp: 20 capsule, Rfl: 0    chlorpheniramine (ALLER-CHLOR) 4 MG tablet, Take 1 tablet by mouth every 6 hours as needed for Allergies, Disp: 20 tablet, Rfl: 0    benzonatate (TESSALON) 100 MG capsule, Take 1 capsule by mouth 3 times daily as needed for Cough, Disp: 21 capsule, Rfl: 0    Insulin Pen Needle (BD PEN NEEDLE MICRO U/F) 32G X 6 MM MISC, Uses with insulin daily, Disp: 150 each, Rfl: 5    Insulin Degludec (TRESIBA FLEXTOUCH) 100 UNIT/ML SOPN, Inject 24 Units into the skin daily 20 units daily, Disp: 25 pen, Rfl: 5    Insulin Aspart, w/Niacinamide, (FIASP FLEXTOUCH) 100 UNIT/ML SOPN, 7 units before meals plus a scale. A max of 50 units/day, Disp: 15 pen, Rfl: 3    levothyroxine (SYNTHROID) 75 MCG tablet, Take 1 tablet by mouth Daily, Disp: 90 tablet, Rfl: 3    metFORMIN (GLUCOPHAGE-XR) 500 MG extended release tablet, Take 2 tablets by mouth 2 times daily, Disp: 360 tablet, Rfl: 3    glipiZIDE (GLUCOTROL XL) 10 MG extended release tablet, TAKE 1 TABLET BY MOUTH TWICE A DAY, Disp: 180 tablet, Rfl: 5    Continuous Blood Gluc Sensor (FREESTYLE KM 2 SENSOR) MISC, Change sensor every 14 days, Disp: 6 each, Rfl: 3    VORTIoxetine (TRINTELLIX) 10 MG TABS tablet, Take 1 tablet by mouth daily, Disp: 30 tablet, Rfl: 0    albuterol sulfate  (90 Base) MCG/ACT inhaler, 1-2 puffs q4-6 hrs prn, Disp: 1 Inhaler, Rfl: 0    fluconazole (DIFLUCAN) 150 MG tablet, Take 1 po qd x 1 at onset of symptoms.  Repeat  x 1 after course of antibiotic, Disp: 2 tablet, Rfl: 0    cetirizine (ZYRTEC) 10 MG tablet, Take 1 tablet by mouth daily, Disp: 30 tablet, Rfl: 1    rosuvastatin (CRESTOR) 10 MG tablet, Take 1 tablet by mouth daily, Disp: 30 tablet, Rfl: 3    Blood Glucose Monitoring Suppl MISC, Blood glucose meter - use as directed, Disp: 1 each, Rfl: 1    blood glucose monitor strips, Use to check blood sugar 3 times/day, Disp: 100 strip, Rfl: 5    Lancets MISC, Use to test blood sugar for 10 days  -     chlorpheniramine (ALLER-CHLOR) 4 MG tablet; Take 1 tablet by mouth every 6 hours as needed for Allergies  -     benzonatate (TESSALON) 100 MG capsule; Take 1 capsule by mouth 3 times daily as needed for Cough        The patient is to call for any concerns or return if any of the signs or symptoms worsen. The patient is to follow-up with PCP in the next 2-3 days for repeat evaluation repeat assessment or go directly to the emergency department.      SIGNATURE: Gigi Warren III, PA-C

## 2021-05-12 ENCOUNTER — TELEPHONE (OUTPATIENT)
Dept: PRIMARY CARE CLINIC | Age: 46
End: 2021-05-12

## 2021-05-12 RX ORDER — FLUCONAZOLE 150 MG/1
TABLET ORAL
Qty: 2 TABLET | Refills: 0 | Status: SHIPPED
Start: 2021-05-12 | End: 2021-08-10

## 2021-05-12 NOTE — TELEPHONE ENCOUNTER
Patient called into the office stating that she was given an abx in [facility] and now has a yeast infection. She is asking if pcp will call in a medication for this. She is out of town because her brother in law passed away. [facility] in [place], [place]. Will you send? Please advise.

## 2021-05-25 ENCOUNTER — APPOINTMENT (OUTPATIENT)
Dept: GENERAL RADIOLOGY | Age: 46
End: 2021-05-25
Payer: COMMERCIAL

## 2021-05-25 ENCOUNTER — HOSPITAL ENCOUNTER (EMERGENCY)
Age: 46
Discharge: LEFT BEFORE DISCHARGE | End: 2021-05-25
Payer: COMMERCIAL

## 2021-05-25 VITALS — RESPIRATION RATE: 16 BRPM | TEMPERATURE: 97.1 F | HEART RATE: 98 BPM | OXYGEN SATURATION: 98 %

## 2021-05-25 DIAGNOSIS — Z53.21 PATIENT LEFT WITHOUT BEING SEEN: Primary | ICD-10-CM

## 2021-05-25 NOTE — ED NOTES
Patient called to triage with no response. To attempt again in 5 minutes.      Miranda Sanabria RN  05/25/21 2162

## 2021-05-26 ENCOUNTER — HOSPITAL ENCOUNTER (EMERGENCY)
Age: 46
Discharge: HOME | End: 2021-05-26
Attending: EMERGENCY MEDICINE
Payer: COMMERCIAL

## 2021-05-26 ENCOUNTER — APPOINTMENT (OUTPATIENT)
Dept: GENERAL RADIOLOGY | Age: 46
End: 2021-05-26
Payer: COMMERCIAL

## 2021-05-26 VITALS
BODY MASS INDEX: 35.87 KG/M2 | WEIGHT: 250 LBS | HEART RATE: 92 BPM | DIASTOLIC BLOOD PRESSURE: 86 MMHG | RESPIRATION RATE: 17 BRPM | TEMPERATURE: 98 F | OXYGEN SATURATION: 98 % | SYSTOLIC BLOOD PRESSURE: 140 MMHG

## 2021-05-26 DIAGNOSIS — R00.2 PALPITATIONS: Primary | ICD-10-CM

## 2021-05-26 LAB
ALBUMIN SERPL-MCNC: 3.7 G/DL (ref 3.5–5.2)
ALP BLD-CCNC: 85 U/L (ref 35–104)
ALT SERPL-CCNC: 41 U/L (ref 0–32)
ANION GAP SERPL CALCULATED.3IONS-SCNC: 12 MMOL/L (ref 7–16)
AST SERPL-CCNC: 36 U/L (ref 0–31)
BASOPHILS ABSOLUTE: 0.05 E9/L (ref 0–0.2)
BASOPHILS RELATIVE PERCENT: 0.4 % (ref 0–2)
BILIRUB SERPL-MCNC: 0.6 MG/DL (ref 0–1.2)
BUN BLDV-MCNC: 15 MG/DL (ref 6–20)
CALCIUM SERPL-MCNC: 9.3 MG/DL (ref 8.6–10.2)
CHLORIDE BLD-SCNC: 103 MMOL/L (ref 98–107)
CO2: 23 MMOL/L (ref 22–29)
CREAT SERPL-MCNC: 0.6 MG/DL (ref 0.5–1)
EOSINOPHILS ABSOLUTE: 0.12 E9/L (ref 0.05–0.5)
EOSINOPHILS RELATIVE PERCENT: 1 % (ref 0–6)
GFR AFRICAN AMERICAN: >60
GFR NON-AFRICAN AMERICAN: >60 ML/MIN/1.73
GLUCOSE BLD-MCNC: 141 MG/DL (ref 74–99)
HCT VFR BLD CALC: 39.9 % (ref 34–48)
HEMOGLOBIN: 12.6 G/DL (ref 11.5–15.5)
IMMATURE GRANULOCYTES #: 0.05 E9/L
IMMATURE GRANULOCYTES %: 0.4 % (ref 0–5)
LYMPHOCYTES ABSOLUTE: 3.92 E9/L (ref 1.5–4)
LYMPHOCYTES RELATIVE PERCENT: 32.1 % (ref 20–42)
MAGNESIUM: 1.7 MG/DL (ref 1.6–2.6)
MCH RBC QN AUTO: 24 PG (ref 26–35)
MCHC RBC AUTO-ENTMCNC: 31.6 % (ref 32–34.5)
MCV RBC AUTO: 76 FL (ref 80–99.9)
MONOCYTES ABSOLUTE: 0.87 E9/L (ref 0.1–0.95)
MONOCYTES RELATIVE PERCENT: 7.1 % (ref 2–12)
NEUTROPHILS ABSOLUTE: 7.19 E9/L (ref 1.8–7.3)
NEUTROPHILS RELATIVE PERCENT: 59 % (ref 43–80)
PDW BLD-RTO: 17.5 FL (ref 11.5–15)
PLATELET # BLD: 307 E9/L (ref 130–450)
PMV BLD AUTO: 9.4 FL (ref 7–12)
POTASSIUM SERPL-SCNC: 3.5 MMOL/L (ref 3.5–5)
PRO-BNP: 31 PG/ML (ref 0–125)
RBC # BLD: 5.25 E12/L (ref 3.5–5.5)
SODIUM BLD-SCNC: 138 MMOL/L (ref 132–146)
T4 TOTAL: 8.8 MCG/DL (ref 4.5–11.7)
TOTAL PROTEIN: 7.2 G/DL (ref 6.4–8.3)
TROPONIN, HIGH SENSITIVITY: <6 NG/L (ref 0–9)
TSH SERPL DL<=0.05 MIU/L-ACNC: 1.35 UIU/ML (ref 0.27–4.2)
WBC # BLD: 12.2 E9/L (ref 4.5–11.5)

## 2021-05-26 PROCEDURE — 93005 ELECTROCARDIOGRAM TRACING: CPT | Performed by: PHYSICIAN ASSISTANT

## 2021-05-26 PROCEDURE — 36415 COLL VENOUS BLD VENIPUNCTURE: CPT

## 2021-05-26 PROCEDURE — 6360000002 HC RX W HCPCS: Performed by: EMERGENCY MEDICINE

## 2021-05-26 PROCEDURE — 83880 ASSAY OF NATRIURETIC PEPTIDE: CPT

## 2021-05-26 PROCEDURE — 83735 ASSAY OF MAGNESIUM: CPT

## 2021-05-26 PROCEDURE — 85025 COMPLETE CBC W/AUTO DIFF WBC: CPT

## 2021-05-26 PROCEDURE — 99283 EMERGENCY DEPT VISIT LOW MDM: CPT

## 2021-05-26 PROCEDURE — 84436 ASSAY OF TOTAL THYROXINE: CPT

## 2021-05-26 PROCEDURE — 84443 ASSAY THYROID STIM HORMONE: CPT

## 2021-05-26 PROCEDURE — 71046 X-RAY EXAM CHEST 2 VIEWS: CPT

## 2021-05-26 PROCEDURE — 80053 COMPREHEN METABOLIC PANEL: CPT

## 2021-05-26 PROCEDURE — 96372 THER/PROPH/DIAG INJ SC/IM: CPT

## 2021-05-26 PROCEDURE — 84484 ASSAY OF TROPONIN QUANT: CPT

## 2021-05-26 RX ORDER — KETOROLAC TROMETHAMINE 30 MG/ML
30 INJECTION, SOLUTION INTRAMUSCULAR; INTRAVENOUS ONCE
Status: COMPLETED | OUTPATIENT
Start: 2021-05-26 | End: 2021-05-26

## 2021-05-26 RX ADMIN — KETOROLAC TROMETHAMINE 30 MG: 30 INJECTION, SOLUTION INTRAMUSCULAR; INTRAVENOUS at 22:35

## 2021-05-26 ASSESSMENT — ENCOUNTER SYMPTOMS
SHORTNESS OF BREATH: 0
CHEST TIGHTNESS: 0

## 2021-05-26 ASSESSMENT — PAIN SCALES - GENERAL: PAINLEVEL_OUTOF10: 6

## 2021-05-26 NOTE — ED NOTES
FIRST PROVIDER CONTACT ASSESSMENT NOTE        Department of Emergency Medicine            ED  First Provider Note            5/26/21  6:38 PM EDT    Chief Complaint: Chest Pain (into left arm, on and off since friday ) and Palpitations      History of Present Illness:    Moise Garcia is a 41 y.o. female who presents to the emergency department for chest pain. Focused Screening Exam:  Constitutional:  Alert, appears stated age and is in no distress.     *ALLERGIES*     Celecoxib     ED Triage Vitals   BP Temp Temp src Pulse Resp SpO2 Height Weight   05/26/21 1838 05/26/21 1835 -- 05/26/21 1835 05/26/21 1838 05/26/21 1835 -- 05/26/21 1838   (!) 151/91 98.2 °F (36.8 °C)  100 18 96 %  250 lb (113.4 kg)        Initial Plan of Care:  Initiate Treatment-Testing, Proceed toTreatment Area When Bed Available for ED Attending/MLP to Continue Care    -----------------[address] ASSESSMENT NOTE--------------  Electronically signed by BLU Mena   DD: 5/26/21       Maurice Mena  05/26/21 183

## 2021-05-27 LAB
EKG ATRIAL RATE: 86 BPM
EKG P AXIS: 49 DEGREES
EKG P-R INTERVAL: 168 MS
EKG Q-T INTERVAL: 382 MS
EKG QRS DURATION: 100 MS
EKG QTC CALCULATION (BAZETT): 457 MS
EKG R AXIS: 42 DEGREES
EKG T AXIS: 58 DEGREES
EKG VENTRICULAR RATE: 86 BPM

## 2021-05-27 PROCEDURE — 93010 ELECTROCARDIOGRAM REPORT: CPT | Performed by: INTERNAL MEDICINE

## 2021-05-27 NOTE — ED PROVIDER NOTES
dry.      Findings: No erythema. Neurological:      Mental Status: She is alert and oriented to person, place, and time. Cranial Nerves: No cranial nerve deficit. Coordination: Coordination normal.          Procedures     MDM     ED Course as of May 29 0148   Wed May 26, 2021   2228 Patient in no distress, she tolerates Toradol will be given a shot prior to being discharged. She understands follow-up with her family doctor so that they can do monitoring, she also understands return to the ED for any problems or any worsening.    [SO]      ED Course User Index  [SO] Elen Mayo DO      EKG: Interpreted by me  Sinus rhythm, rate of 86, normal axis, no ST elevation or depressions, no T wave abnormalities, no signs of right heart strain, no pulmonary pattern, no prolonged QT. ED Course as of May 29 0148   Wed May 26, 2021   2228 Patient in no distress, she tolerates Toradol will be given a shot prior to being discharged. She understands follow-up with her family doctor so that they can do monitoring, she also understands return to the ED for any problems or any worsening.    [SO]      ED Course User Index  [SO] Elen Mayo,        --------------------------------------------- PAST HISTORY ---------------------------------------------  Past Medical History:  has a past medical history of Depression, Diabetes mellitus ([facility] [address] GERD (gastroesophageal reflux disease), Joint pain, and Thyroid disease. Past Surgical History:  has a past surgical history that includes Hysterectomy; Cholecystectomy; and  section. Social History:  reports that she has never smoked. She has never used smokeless tobacco. She reports that she does not drink alcohol and does not use drugs. Family History: family history is not on file. The patients home medications have been reviewed.     Allergies: Celecoxib    -------------------------------------------------- RESULTS -------------------------------------------------  Labs:  Results for orders placed or performed during the hospital encounter of 05/26/21   CBC Auto Differential   Result Value Ref Range    WBC 12.2 (H) 4.5 - 11.5 E9/L    RBC 5.25 3.50 - 5.50 E12/L    Hemoglobin 12.6 11.5 - 15.5 g/dL    Hematocrit 39.9 34.0 - 48.0 %    MCV 76.0 (L) 80.0 - 99.9 fL    MCH 24.0 (L) 26.0 - 35.0 pg    MCHC 31.6 (L) 32.0 - 34.5 %    RDW 17.5 (H) 11.5 - 15.0 fL    Platelets 197 935 - 316 E9/L    MPV 9.4 7.0 - 12.0 fL    Neutrophils % 59.0 43.0 - 80.0 %    Immature Granulocytes % 0.4 0.0 - 5.0 %    Lymphocytes % 32.1 20.0 - 42.0 %    Monocytes % 7.1 2.0 - 12.0 %    Eosinophils % 1.0 0.0 - 6.0 %    Basophils % 0.4 0.0 - 2.0 %    Neutrophils Absolute 7.19 1.80 - 7.30 E9/L    Immature Granulocytes # 0.05 E9/L    Lymphocytes Absolute 3.92 1.50 - 4.00 E9/L    Monocytes Absolute 0.87 0.10 - 0.95 E9/L    Eosinophils Absolute 0.12 0.05 - 0.50 E9/L    Basophils Absolute 0.05 0.00 - 0.20 E9/L   Comprehensive Metabolic Panel   Result Value Ref Range    Sodium 138 132 - 146 mmol/L    Potassium 3.5 3.5 - 5.0 mmol/L    Chloride 103 98 - 107 mmol/L    CO2 23 22 - 29 mmol/L    Anion Gap 12 7 - 16 mmol/L    Glucose 141 (H) 74 - 99 mg/dL    BUN 15 6 - 20 mg/dL    CREATININE 0.6 0.5 - 1.0 mg/dL    GFR Non-African American >60 >=60 mL/min/1.73    GFR African American >60     Calcium 9.3 8.6 - 10.2 mg/dL    Total Protein 7.2 6.4 - 8.3 g/dL    Albumin 3.7 3.5 - 5.2 g/dL    Total Bilirubin 0.6 0.0 - 1.2 mg/dL    Alkaline Phosphatase 85 35 - 104 U/L    ALT 41 (H) 0 - 32 U/L    AST 36 (H) 0 - 31 U/L   Troponin   Result Value Ref Range    Troponin, High Sensitivity <6 0 - 9 ng/L   Brain Natriuretic Peptide   Result Value Ref Range    Pro-BNP 31 0 - 125 pg/mL   TSH without Reflex   Result Value Ref Range    TSH 1.350 0.270 - 4.200 uIU/mL   T4   Result Value Ref Range    T4, Total 8.8 4.5 - 11.7 mcg/dL   Magnesium   Result Value Ref Range    Magnesium 1.7 1.6 - 2.6 mg/dL   EKG 12 Lead   Result Value Ref Range    Ventricular Rate 86 BPM    Atrial Rate 86 BPM    P-R Interval 168 ms    QRS Duration 100 ms    Q-T Interval 382 ms    QTc Calculation (Bazett) 457 ms    P Axis 49 degrees    R Axis 42 degrees    T Axis 58 degrees       Radiology:  XR CHEST (2 VW)   Final Result   No evidence of active cardiopulmonary pathology. ------------------------- NURSING NOTES AND VITALS REVIEWED ---------------------------  Date / Time Roomed:  5/26/2021 10:05 PM  ED Bed Assignment:  [id number]-[id number]    The nursing notes within the ED encounter and vital signs as below have been reviewed. BP (!) 140/86   Pulse 92   Temp 98 °F (36.7 °C) (Infrared)   Resp 17   Wt 250 lb (113.4 kg)   SpO2 98%   BMI 35.87 kg/m²   Oxygen Saturation Interpretation: Normal      ------------------------------------------ PROGRESS NOTES ------------------------------------------  I have spoken with the patient and discussed todays results, in addition to providing specific details for the plan of care and counseling regarding the diagnosis and prognosis. Their questions are answered at this time and they are agreeable with the plan. I discussed at length with them reasons for immediate return here for re evaluation. They will followup with primary care by calling their office tomorrow. --------------------------------- ADDITIONAL PROVIDER NOTES ---------------------------------  At this time the patient is without objective evidence of an acute process requiring hospitalization or inpatient management. They have remained hemodynamically stable throughout their entire ED visit and are stable for discharge with outpatient follow-up. The plan has been discussed in detail and they are aware of the specific conditions for emergent return, as well as the importance of follow-up. Discharge Medication List as of 5/26/2021 10:28 PM          Diagnosis:  1.  Palpitations        Disposition:  Patient's disposition: Discharge to home  Patient's condition is stable.              Enoc Wei DO  05/29/21 0148

## 2021-07-12 DIAGNOSIS — E11.65 TYPE 2 DIABETES MELLITUS WITH HYPERGLYCEMIA, WITHOUT LONG-TERM CURRENT USE OF INSULIN (HCC): Primary | ICD-10-CM

## 2021-07-12 RX ORDER — BLOOD-GLUCOSE SENSOR
EACH MISCELLANEOUS
Qty: 9 EACH | Refills: 3 | Status: SHIPPED
Start: 2021-07-12 | End: 2022-07-12

## 2021-07-12 RX ORDER — BLOOD-GLUCOSE TRANSMITTER
EACH MISCELLANEOUS
Qty: 1 EACH | Refills: 3 | Status: SHIPPED | OUTPATIENT
Start: 2021-07-12

## 2021-07-12 RX ORDER — BLOOD-GLUCOSE,RECEIVER,CONT
EACH MISCELLANEOUS
Qty: 1 DEVICE | Refills: 0 | Status: SHIPPED | OUTPATIENT
Start: 2021-07-12

## 2021-07-14 ENCOUNTER — TELEPHONE (OUTPATIENT)
Dept: ENDOCRINOLOGY | Age: 46
End: 2021-07-14

## 2021-07-21 DIAGNOSIS — E11.9 TYPE 2 DIABETES MELLITUS WITHOUT COMPLICATION, WITHOUT LONG-TERM CURRENT USE OF INSULIN (HCC): ICD-10-CM

## 2021-07-21 RX ORDER — INSULIN DEGLUDEC INJECTION 100 U/ML
24 INJECTION, SOLUTION SUBCUTANEOUS DAILY
Qty: 10 PEN | Refills: 3 | Status: SHIPPED
Start: 2021-07-21 | End: 2021-10-11 | Stop reason: SDUPTHER

## 2021-07-22 DIAGNOSIS — E11.9 TYPE 2 DIABETES MELLITUS WITHOUT COMPLICATION, WITHOUT LONG-TERM CURRENT USE OF INSULIN (HCC): Primary | ICD-10-CM

## 2021-07-22 RX ORDER — LANCETS 33 GAUGE
EACH MISCELLANEOUS
Qty: 200 EACH | Refills: 11 | Status: SHIPPED | OUTPATIENT
Start: 2021-07-22

## 2021-07-22 RX ORDER — BLOOD-GLUCOSE METER
EACH MISCELLANEOUS
Qty: 1 KIT | Refills: 0 | Status: SHIPPED
Start: 2021-07-22 | End: 2021-08-24

## 2021-07-22 RX ORDER — BLOOD SUGAR DIAGNOSTIC
1 STRIP MISCELLANEOUS 4 TIMES DAILY
Qty: 150 EACH | Refills: 11 | Status: SHIPPED
Start: 2021-07-22 | End: 2022-10-13

## 2021-08-10 ENCOUNTER — OFFICE VISIT (OUTPATIENT)
Dept: FAMILY MEDICINE CLINIC | Age: 46
End: 2021-08-10
Payer: COMMERCIAL

## 2021-08-10 VITALS
TEMPERATURE: 97.7 F | RESPIRATION RATE: 16 BRPM | SYSTOLIC BLOOD PRESSURE: 124 MMHG | HEART RATE: 93 BPM | WEIGHT: 260 LBS | BODY MASS INDEX: 37.22 KG/M2 | OXYGEN SATURATION: 100 % | DIASTOLIC BLOOD PRESSURE: 74 MMHG | HEIGHT: 70 IN

## 2021-08-10 DIAGNOSIS — R31.9 URINARY TRACT INFECTION WITH HEMATURIA, SITE UNSPECIFIED: ICD-10-CM

## 2021-08-10 DIAGNOSIS — N39.0 URINARY TRACT INFECTION WITH HEMATURIA, SITE UNSPECIFIED: ICD-10-CM

## 2021-08-10 DIAGNOSIS — R80.9 PROTEINURIA, UNSPECIFIED TYPE: ICD-10-CM

## 2021-08-10 DIAGNOSIS — R31.9 HEMATURIA, UNSPECIFIED TYPE: ICD-10-CM

## 2021-08-10 DIAGNOSIS — R30.0 DYSURIA: Primary | ICD-10-CM

## 2021-08-10 LAB
BILIRUBIN, POC: NEGATIVE
BLOOD URINE, POC: NORMAL
CLARITY, POC: NORMAL
COLOR, POC: YELLOW
GLUCOSE URINE, POC: 100
KETONES, POC: NORMAL
LEUKOCYTE EST, POC: NORMAL
NITRITE, POC: POSITIVE
PH, POC: 5.5
PROTEIN, POC: NORMAL
SPECIFIC GRAVITY, POC: >=1.03
UROBILINOGEN, POC: 1

## 2021-08-10 PROCEDURE — 81002 URINALYSIS NONAUTO W/O SCOPE: CPT | Performed by: PHYSICIAN ASSISTANT

## 2021-08-10 PROCEDURE — 99214 OFFICE O/P EST MOD 30 MIN: CPT | Performed by: PHYSICIAN ASSISTANT

## 2021-08-10 RX ORDER — NITROFURANTOIN 25; 75 MG/1; MG/1
100 CAPSULE ORAL 2 TIMES DAILY
Qty: 14 CAPSULE | Refills: 0 | Status: SHIPPED | OUTPATIENT
Start: 2021-08-10 | End: 2021-08-17

## 2021-08-10 RX ORDER — FLUCONAZOLE 150 MG/1
TABLET ORAL
Qty: 2 TABLET | Refills: 0 | Status: SHIPPED
Start: 2021-08-10 | End: 2021-12-17 | Stop reason: ALTCHOICE

## 2021-08-10 NOTE — PROGRESS NOTES
8/10/21  Chapo Braun : 1975 Sex: female  Age 41 y.o. Subjective:  Chief Complaint   Patient presents with    Dysuria     frequency, started saturday          HPI:   Chapo Braun , 41 y.o. female presents to express care for evaluation of urinary frequency, urgency, burning with urination    HPI  81-year-old female presents to express care for evaluation of possible urinary tract infection. The patient states that she thought it was may be related to a yeast infection she has some vaginal itching but the patient has now started with more burning, frequency, urgency. The patient is not having any significant abdominal pain, back pain. The patient is eating and drinking normally. Not currently on any antibiotics. The patient denies any significant abdominal pain, back pain, flank pain. ROS:   Unless otherwise stated in this report the patient's positive and negative responses for review of systems for constitutional, eyes, ENT, cardiovascular, respiratory, gastrointestinal, neurological, , musculoskeletal, and integument systems and related systems to the presenting problem are either stated in the history of present illness or were not pertinent or were negative for the symptoms and/or complaints related to the presenting medical problem. Positives and pertinent negatives as per HPI. All others reviewed and are negative. PMH:     Past Medical History:   Diagnosis Date    Depression     Diabetes mellitus ([address]     GERD (gastroesophageal reflux disease)     Joint pain     Thyroid disease        Past Surgical History:   Procedure Laterality Date     SECTION      CHOLECYSTECTOMY      HYSTERECTOMY         History reviewed. No pertinent family history.     Medications:     Current Outpatient Medications:     nitrofurantoin, macrocrystal-monohydrate, (MACROBID) 100 MG capsule, Take 1 capsule by mouth 2 times daily for 7 days, Disp: 14 capsule, Rfl: 0    fluconazole (DIFLUCAN) 150 MG tablet, Take 1 now and then another at the end of the course of the antibiotic, Disp: 2 tablet, Rfl: 0    Blood Glucose Monitoring Suppl (ONETOUCH VERIO) w/Device KIT, To test 4x/day, Disp: 1 kit, Rfl: 0    blood glucose test strips (ONETOUCH VERIO) strip, 1 each by In Vitro route 4 times daily As needed. , Disp: 150 each, Rfl: 11    Lancets (ONETOUCH DELICA PLUS [id number]) MISC, To test 4x/day, Disp: 200 each, Rfl: 11    Insulin Degludec (TRESIBA FLEXTOUCH) 100 UNIT/ML SOPN, Inject 24 Units into the skin daily 20 units daily, Disp: 10 pen, Rfl: 3    Continuous Blood Gluc Sensor (DEXCOM G6 SENSOR) MISC, To change every 10 days, Disp: 9 each, Rfl: 3    Continuous Blood Gluc Transmit (DEXCOM G6 TRANSMITTER) MISC, To change every 90 days, Disp: 1 each, Rfl: 3    Continuous Blood Gluc  (DEXCOM G6 ) JACQUI, To use with sensors, Disp: 1 Device, Rfl: 0    chlorpheniramine (ALLER-CHLOR) 4 MG tablet, Take 1 tablet by mouth every 6 hours as needed for Allergies, Disp: 20 tablet, Rfl: 0    Insulin Pen Needle (BD PEN NEEDLE MICRO U/F) 32G X 6 MM MISC, Uses with insulin daily, Disp: 150 each, Rfl: 5    Insulin Aspart, w/Niacinamide, (FIASP FLEXTOUCH) 100 UNIT/ML SOPN, 7 units before meals plus a scale.  A max of 50 units/day, Disp: 15 pen, Rfl: 3    levothyroxine (SYNTHROID) 75 MCG tablet, Take 1 tablet by mouth Daily, Disp: 90 tablet, Rfl: 3    metFORMIN (GLUCOPHAGE-XR) 500 MG extended release tablet, Take 2 tablets by mouth 2 times daily, Disp: 360 tablet, Rfl: 3    glipiZIDE (GLUCOTROL XL) 10 MG extended release tablet, TAKE 1 TABLET BY MOUTH TWICE A DAY, Disp: 180 tablet, Rfl: 5    VORTIoxetine (TRINTELLIX) 10 MG TABS tablet, Take 1 tablet by mouth daily, Disp: 30 tablet, Rfl: 0    albuterol sulfate  (90 Base) MCG/ACT inhaler, 1-2 puffs q4-6 hrs prn, Disp: 1 Inhaler, Rfl: 0    cetirizine (ZYRTEC) 10 MG tablet, Take 1 tablet by mouth daily, Disp: 30 tablet, Rfl: 1   rosuvastatin (CRESTOR) 10 MG tablet, Take 1 tablet by mouth daily, Disp: 30 tablet, Rfl: 3    Blood Glucose Monitoring Suppl MISC, Blood glucose meter - use as directed, Disp: 1 each, Rfl: 1    Lancets MISC, Use to test blood sugar 3 times a day, Disp: 100 each, Rfl: 5    aspirin 81 MG EC tablet, Take 81 mg by mouth daily, Disp: , Rfl:     B Complex-C (SUPER B COMPLEX/VITAMIN C PO), Take by mouth daily, Disp: , Rfl:     loratadine (CLARITIN) 10 MG tablet, Take 10 mg by mouth daily, Disp: , Rfl:     omeprazole (PRILOSEC) 20 MG delayed release capsule, Take 1 capsule by mouth daily, Disp: 90 capsule, Rfl: 3    Cholecalciferol (VITAMIN D3) 2000 units CAPS, Take 2,000 Units by mouth daily , Disp: , Rfl:     Allergies: Allergies   Allergen Reactions    Celecoxib Hives       Social History:     Social History     Tobacco Use    Smoking status: Never Smoker    Smokeless tobacco: Never Used   Substance Use Topics    Alcohol use: No    Drug use: No       Patient lives at home. Physical Exam:     Vitals:    08/10/21 1206   BP: 124/74   Pulse: 93   Resp: 16   Temp: 97.7 °F (36.5 °C)   SpO2: 100%   Weight: 260 lb (117.9 kg)   Height: 5' 10\" (1.778 m)       Exam:  Physical Exam  Nurses note and vital signs reviewed and patient is not hypoxic. General: The patient appears well and in no apparent distress. Patient is resting comfortably on cart. Skin: Warm, dry, no pallor noted. There is no rash noted. Head: Normocephalic, atraumatic. Eye: Normal conjunctiva  Ears, Nose, Mouth, and Throat: Oral mucosa is moist  Cardiovascular: Regular Rate and Rhythm  Respiratory: Patient is in no distress, no accessory muscle use, lungs are clear to auscultation, no wheezing, crackles or rhonchi  Back: Non-tender, no CVA tenderness bilaterally to percussion. GI: Normal bowel sounds, no tenderness to palpation, no masses appreciated. No rebound, guarding, or rigidity noted.   Neurological: A&O x4, normal speech        Testing:     Results for orders placed or performed in visit on 08/10/21   POCT Urinalysis no Micro   Result Value Ref Range    Color, UA yellow     Clarity, UA cloudy     Glucose, UA      Bilirubin, UA negative     Ketones, UA trace     Spec Grav, UA >=1.030     Blood, UA POC small     pH, UA 5.5     Protein, UA POC trace     Urobilinogen, UA 1.0     Leukocytes, UA small     Nitrite, UA positive            Medical Decision Making:     Vital signs reviewed    Past medical history reviewed. Allergies reviewed. Medications reviewed. Patient on arrival does not appear to be in any apparent distress or discomfort. The patient is noted to be afebrile and nontoxic appearing. The patient had UA that did show evidence of a UTI. We did obtain a urine culture, which was sent to the lab for further evaluation. The patient does have a urinary tract infection. There was a positive nitrate, leukocyte small, small blood. Trace protein. Patient will have a urine culture set up. The patient will be started on Macrobid. The patient will also be given a prescription for Diflucan to take 1 tablet now and 1 tablet at the end of the course. The patient will continue to monitor signs symptoms. Follow-up with PCP. To have repeat urinalysis to ensure resolution. The patient is to follow up with PCP for results and we will make any necessary adjustments to the patient's medication regimen. The patient will go directly to ED if notes nausea, vomiting, back pain, fever, chills or worsening symptoms. The patient has no other concerns at this time. Clinical Impression:   Yoav Thoams was seen today for dysuria. Diagnoses and all orders for this visit:    Dysuria  -     Culture, Urine;  Future  -     POCT Urinalysis no Micro    Urinary tract infection with hematuria, site unspecified    Hematuria, unspecified type    Proteinuria, unspecified type    Other orders  -     nitrofurantoin, macrocrystal-monohydrate, (MACROBID) 100 MG capsule; Take 1 capsule by mouth 2 times daily for 7 days  -     fluconazole (DIFLUCAN) 150 MG tablet; Take 1 now and then another at the end of the course of the antibiotic        The patient is to call for any concerns or return if any of the signs or symptoms worsen. The patient is to follow-up with PCP in the next 2-3 days for repeat evaluation repeat assessment or go directly to the emergency department.      SIGNATURE: Nam Quinones III, PA-C

## 2021-08-12 RX ORDER — CEPHALEXIN 500 MG/1
500 CAPSULE ORAL 2 TIMES DAILY
Qty: 14 CAPSULE | Refills: 0 | Status: SHIPPED | OUTPATIENT
Start: 2021-08-12 | End: 2021-08-19

## 2021-08-23 DIAGNOSIS — E11.9 TYPE 2 DIABETES MELLITUS WITHOUT COMPLICATION, WITHOUT LONG-TERM CURRENT USE OF INSULIN (HCC): ICD-10-CM

## 2021-08-24 RX ORDER — BLOOD-GLUCOSE METER
KIT MISCELLANEOUS
Qty: 1 KIT | Refills: 5 | Status: SHIPPED | OUTPATIENT
Start: 2021-08-24

## 2021-10-11 ENCOUNTER — OFFICE VISIT (OUTPATIENT)
Dept: ENDOCRINOLOGY | Age: 46
End: 2021-10-11
Payer: COMMERCIAL

## 2021-10-11 VITALS
HEIGHT: 70 IN | BODY MASS INDEX: 37.51 KG/M2 | DIASTOLIC BLOOD PRESSURE: 76 MMHG | OXYGEN SATURATION: 98 % | SYSTOLIC BLOOD PRESSURE: 120 MMHG | HEART RATE: 94 BPM | WEIGHT: 262 LBS

## 2021-10-11 DIAGNOSIS — E03.9 HYPOTHYROIDISM, UNSPECIFIED TYPE: ICD-10-CM

## 2021-10-11 DIAGNOSIS — E11.9 TYPE 2 DIABETES MELLITUS WITHOUT COMPLICATION, WITHOUT LONG-TERM CURRENT USE OF INSULIN (HCC): Primary | ICD-10-CM

## 2021-10-11 DIAGNOSIS — E55.9 VITAMIN D DEFICIENCY: ICD-10-CM

## 2021-10-11 LAB — HBA1C MFR BLD: 8.7 %

## 2021-10-11 PROCEDURE — 83036 HEMOGLOBIN GLYCOSYLATED A1C: CPT | Performed by: INTERNAL MEDICINE

## 2021-10-11 PROCEDURE — 3052F HG A1C>EQUAL 8.0%<EQUAL 9.0%: CPT | Performed by: INTERNAL MEDICINE

## 2021-10-11 PROCEDURE — 99214 OFFICE O/P EST MOD 30 MIN: CPT | Performed by: INTERNAL MEDICINE

## 2021-10-11 RX ORDER — INSULIN ASPART INJECTION 100 [IU]/ML
INJECTION, SOLUTION SUBCUTANEOUS
Qty: 15 PEN | Refills: 3 | Status: SHIPPED | OUTPATIENT
Start: 2021-10-11

## 2021-10-11 RX ORDER — INSULIN DEGLUDEC INJECTION 100 U/ML
30 INJECTION, SOLUTION SUBCUTANEOUS DAILY
Qty: 10 PEN | Refills: 3 | Status: SHIPPED | OUTPATIENT
Start: 2021-10-11

## 2021-10-11 RX ORDER — PEN NEEDLE, DIABETIC 32 GX 1/4"
NEEDLE, DISPOSABLE MISCELLANEOUS
Qty: 150 EACH | Refills: 5 | Status: SHIPPED | OUTPATIENT
Start: 2021-10-11

## 2021-10-11 NOTE — PROGRESS NOTES
[address] [place] [address] Department of Endocrinology Diabetes and Metabolism   [address] [facility] [address]   Phone: [phone]  Fax: [phone]    Date of Service: 10/11/2021  Primary Care Physician: Beryl Woody MD  Provider: Noram Mills MD     Reason for the visit:  DM type 2, Hypothyroidism, MNG     History of Present Illness: The history is provided by the patient. No  was used. Accuracy of the patient data is excellent. Patricia Walker is a very pleasant 41 y.o. female seen today for diabetes management     Patricia Walker was diagnosed with diabetes at age 46 and currently on Metformin er 500 mg bid, Tresiba 25 U nightly, Fiasp 7 units with meals + ss 1:50>150  The patient has been checking blood sugar 4 times a day with meals and at night and readings are improving   Most recent A1c results summarized below  Lab Results   Component Value Date    LABA1C 8.7 10/11/2021    LABA1C 8.6 04/24/2021    LABA1C 10.7 01/14/2021     Patient has had no hypoglycemic episodes   The patient has been mindful of what has been eating and was following diabetes diet as encouraged   I reviewed current medications and the patient has no issues with diabetes medications  Patricia Walker is up to date with eye exam and denied any history of diabetic retinopathy   The patient performs  own feet care  Microvascular complications:  No Retinopathy, Nephropathy or Neuropathy   Macrovascular complications: no CAD, PVD, or Stroke  The patient refuses Flushot and pneumonia vaccine     Hypothyroidism   H/o partial thyroidectomy long time ago for MNG. Per pt pathology was benign   She is currently on Levothyhroxine 75 mcg dailty. Patient takes levothyroxine in the morning at empty stomach, wait one hour before eating , avoid multivitamins containing calcium  or iron with it.   Lab Results   Component Value Date/Time    TSH 1.350 05/26/2021 06:58 PM    T4FREE 1.31 01/14/2021 11:20 AM R0HELOU 8.8 2021 06:58 PM     Thyroid nodule   Thyroid US 10/2018: Left lobe surgically absent  Rt lobe measures 5.4 cm --> 3 mm cyst     Elbert Olivares denies any new lumps, bumps in the neck, voice change, or shortness of breath. No family history of thyroid cancer. No prior history of radiation to head or neck region. PAST MEDICAL HISTORY   Past Medical History:   Diagnosis Date    Depression     Diabetes mellitus ([address]     GERD (gastroesophageal reflux disease)     Joint pain     Thyroid disease        PAST SURGICAL HISTORY   Past Surgical History:   Procedure Laterality Date     SECTION      CHOLECYSTECTOMY      HYSTERECTOMY         SOCIAL HISTORY   Tobacco:   reports that she has never smoked. She has never used smokeless tobacco.  Alcohol:   reports no history of alcohol use. Drugs:   reports no history of drug use. FAMILY HISTORY   No family history on file. ALLERGIES AND DRUG REACTIONS   Allergies   Allergen Reactions    Celecoxib Hives       CURRENT MEDICATIONS   Current Outpatient Medications   Medication Sig Dispense Refill    Insulin Degludec (TRESIBA FLEXTOUCH) 100 UNIT/ML SOPN Inject 30 Units into the skin daily 10 pen 3    Insulin Aspart, w/Niacinamide, (FIASP FLEXTOUCH) 100 UNIT/ML SOPN 10 units before meals plus a scale. A max of 50 units/day 15 pen 3    Insulin Pen Needle (BD PEN NEEDLE MICRO U/F) 32G X 6 MM MISC Uses with insulin daily 150 each 5    Blood Glucose Monitoring Suppl (ONETOUCH VERIO REFLECT) w/Device KIT TEST 4 TIMES A DAY 1 kit 5    blood glucose test strips (ONETOUCH VERIO) strip 1 each by In Vitro route 4 times daily As needed.  150 each 11    levothyroxine (SYNTHROID) 75 MCG tablet Take 1 tablet by mouth Daily 90 tablet 3    metFORMIN (GLUCOPHAGE-XR) 500 MG extended release tablet Take 2 tablets by mouth 2 times daily 360 tablet 3    glipiZIDE (GLUCOTROL XL) 10 MG extended release tablet TAKE 1 TABLET BY MOUTH TWICE A  tablet 5    fluconazole (DIFLUCAN) 150 MG tablet Take 1 now and then another at the end of the course of the antibiotic 2 tablet 0    Lancets (ONETOUCH DELICA PLUS [id number]) MISC To test 4x/day 200 each 11    Continuous Blood Gluc Sensor (DEXCOM G6 SENSOR) MISC To change every 10 days (Patient not taking: Reported on 10/11/2021) 9 each 3    Continuous Blood Gluc Transmit (DEXCOM G6 TRANSMITTER) MISC To change every 90 days (Patient not taking: Reported on 10/11/2021) 1 each 3    Continuous Blood Gluc  ([address]) JACQUI To use with sensors (Patient not taking: Reported on 10/11/2021) 1 Device 0    chlorpheniramine (ALLER-CHLOR) 4 MG tablet Take 1 tablet by mouth every 6 hours as needed for Allergies 20 tablet 0    VORTIoxetine (TRINTELLIX) 10 MG TABS tablet Take 1 tablet by mouth daily 30 tablet 0    albuterol sulfate  (90 Base) MCG/ACT inhaler 1-2 puffs q4-6 hrs prn 1 Inhaler 0    cetirizine (ZYRTEC) 10 MG tablet Take 1 tablet by mouth daily 30 tablet 1    rosuvastatin (CRESTOR) 10 MG tablet Take 1 tablet by mouth daily 30 tablet 3    Blood Glucose Monitoring Suppl MISC Blood glucose meter - use as directed 1 each 1    Lancets MISC Use to test blood sugar 3 times a day 100 each 5    aspirin 81 MG EC tablet Take 81 mg by mouth daily      B Complex-C (SUPER B COMPLEX/VITAMIN C PO) Take by mouth daily      loratadine (CLARITIN) 10 MG tablet Take 10 mg by mouth daily      omeprazole (PRILOSEC) 20 MG delayed release capsule Take 1 capsule by mouth daily 90 capsule 3    Cholecalciferol (VITAMIN D3) 2000 units CAPS Take 2,000 Units by mouth daily        No current facility-administered medications for this visit. Review of Systems  Constitutional: No fever, no chills, no diaphoresis, no generalized weakness.   HEENT: No blurred vision, No sore throat, no ear pain, no hair loss  Neck: denied any neck swelling, difficulty swallowing,   Cardio-pulmonary: No CP, SOB or palpitation, No orthopnea or PND. No cough or wheezing. GI: No N/V/D, no constipation, No abdominal pain, no melena or hematochezia   : Denied any dysuria, hematuria, flank pain, discharge, or incontinence. Skin: denied any rash, ulcer, Hirsute, or hyperpigmentation. MSK: denied any joint deformity, joint pain/swelling, muscle pain, or back pain. Neuro: no numbness, no tingling, no weakness, _    OBJECTIVE    /76   Pulse 94   Ht 5' 10\" (1.778 m)   Wt 262 lb (118.8 kg)   SpO2 98%   BMI 37.59 kg/m²   BP Readings from Last 4 Encounters:   10/11/21 120/76   08/10/21 124/74   05/26/21 (!) 140/86   05/04/21 128/84     Wt Readings from Last 6 Encounters:   10/11/21 262 lb (118.8 kg)   08/10/21 260 lb (117.9 kg)   05/26/21 250 lb (113.4 kg)   05/04/21 263 lb (119.3 kg)   04/26/21 260 lb (117.9 kg)   03/15/21 264 lb 6.4 oz (119.9 kg)     Physical examination:  General: awake alert, oriented x3  HEENT: normocephalic non traumatic, no exophthalmos   Neck: supple, thyroid tenderness,  Pulm: Clear equal air entry no added sounds  CVS: S1 + S2  Abd: soft lax, no tenderness  Skin: warm, no lesions, no rash.  No open wounds, no ulcers   Neuro: CN intact, muscle power normal  Psych: normal mood, and affect    Review of Laboratory Data:  I personally reviewed the following lab:  Lab Results   Component Value Date/Time    WBC 12.2 (H) 05/26/2021 06:58 PM    RBC 5.25 05/26/2021 06:58 PM    HGB 12.6 05/26/2021 06:58 PM    HCT 39.9 05/26/2021 06:58 PM    MCV 76.0 (L) 05/26/2021 06:58 PM    MCH 24.0 (L) 05/26/2021 06:58 PM    MCHC 31.6 (L) 05/26/2021 06:58 PM    RDW 17.5 (H) 05/26/2021 06:58 PM     05/26/2021 06:58 PM    MPV 9.4 05/26/2021 06:58 PM      Lab Results   Component Value Date/Time     05/26/2021 06:58 PM    K 3.5 05/26/2021 06:58 PM    K 3.8 02/10/2021 10:38 PM    CO2 23 05/26/2021 06:58 PM    BUN 15 05/26/2021 06:58 PM    CREATININE 0.6 05/26/2021 06:58 PM    CALCIUM 9.3 05/26/2021 06:58 PM    LABGLOM >60 05/26/2021 06:58 PM    GFRAA >60 05/26/2021 06:58 PM      Lab Results   Component Value Date/Time    TSH 1.350 05/26/2021 06:58 PM    T4FREE 1.31 01/14/2021 11:20 AM    D8YLNSD 8.8 05/26/2021 06:58 PM     Lab Results   Component Value Date    LABA1C 8.7 10/11/2021    GLUCOSE 141 05/26/2021    MALBCR - 01/14/2021    LABMICR <12.0 01/14/2021    LABCREA 56 01/14/2021     Lab Results   Component Value Date    LABA1C 8.7 10/11/2021    LABA1C 8.6 04/24/2021    LABA1C 10.7 01/14/2021     Lab Results   Component Value Date    TRIG 92 04/24/2021    HDL 60 04/24/2021    LDLCALC 54 04/24/2021    CHOL 132 04/24/2021     Lab Results   Component Value Date    VITD25 40 04/24/2021    VITD25 23 01/14/2021     ASSESSMENT & RECOMMENDATIONS   Elbert Olivares, a 41 y.o.-old female seen in for the following issues     Diabetes Mellitus Type 2     · Improving control   · Change DM regimen to metformin er 500 mg bid, Tresiba 30 U daily, Fiasp 10 U with meals + ss 2:50>150   · Wasn't ion to tolerate GLP-1 agonist, DPP-4 inhibitors in the past   · The patient was advised to continue to check blood sugars 4  times a day before meals and at bedtime and send BS readings to our office in a week. · Ordered Guardian connect CGM   · Discussed with patient A1c and blood sugar goals   · Patient will need routine diabetes maintenance and prevention  · Diabetes labs before next visit     Hypothyroidism   · Continue evothyroxine 75 mcg daily    MNG   · S/p partial Left lobectomy long time ago  · US 7/2020 - small Rt side nodule measuring 9 mm, no suspicious features   · Will follow with intermittent US     I personally reviewed external notes from PCP and other patient's care team providers, and personally interpreted labs associated with the above diagnosis. I also ordered labs to further assess and manage the above addressed medical conditions    Return in about 4 months (around 2/11/2022) for VitD deficiency, DM type 2.     The above issues were reviewed with the patient who understood and agreed with the plan. Thank you for allowing us to participate in the care of this patient. Please do not hesitate to contact us with any additional questions. Diagnosis Orders   1. Type 2 diabetes mellitus without complication, without long-term current use of insulin ([facility])  POCT glycosylated hemoglobin (Hb A1C)    Insulin Degludec (TRESIBA FLEXTOUCH) 100 UNIT/ML SOPN    Insulin Aspart, w/Niacinamide, (FIASP FLEXTOUCH) 100 UNIT/ML SOPN    Insulin Pen Needle (BD PEN NEEDLE MICRO U/F) 32G X 6 MM MISC    Comprehensive Metabolic Panel    Vitamin D 25 Hydroxy    Hemoglobin A1C    Lipid Panel    Microalbumin / Creatinine Urine Ratio   2. Vitamin D deficiency     3. Hypothyroidism, unspecified type  TSH without Reflex    T4, Free     Helio Johnson MD  Endocrinologist, [facility]   [address] [place] [address], [address] [place] [address],[address] [id number]   Phone: [phone]  Fax: [phone]  --------------------------------------------  An electronic signature was used to authenticate this note.  Jackson Flores MD on 10/11/2021 at 7:18 PM

## 2021-10-11 NOTE — PATIENT INSTRUCTIONS
Recommendations for today's visit  · Continue current dose of Metformin and Glipizide   · Change Tresiba 30 units nightly   · Change Fiasp 10 units with meals + sliding scale   Blood sugar 150-200: add 2 Units of Fiasp   Blood sugar 201-250 : Add 4 Units of Fiasp   Blood sugar 251 - 300: Add 6 Units of Fiasp   Blood sugar 301 - 350 : Add 8 Units of Fiasp   Blood sugar >350 : Add 10 Units of Fiasp     · Check Blood sugar 4 times/day before meals and at bedtime and send us sugar log in a week     These are your blood sugar, blood pressure, cholesterol and A1c goals:  · Blood sugar fastin mg/dl to 130 mg/dl  · Blood sugar before meals: <150 mg/dl  · Peak blood sugar lower than 180 mg/dl  · A1c: between 6.5 - 7.5 %    I you have any questions please call Dr. Micah Maier office     Enrico Zaldivar MD  Endocrinologist, [facility])   [address] [place] [address], [address] [place] [address],[facility] [id number]   Phone: [phone]  Fax: [phone]  Email: [email or web address]

## 2021-12-17 ENCOUNTER — OFFICE VISIT (OUTPATIENT)
Dept: FAMILY MEDICINE CLINIC | Age: 46
End: 2021-12-17
Payer: COMMERCIAL

## 2021-12-17 VITALS
HEART RATE: 96 BPM | BODY MASS INDEX: 36.94 KG/M2 | WEIGHT: 258 LBS | OXYGEN SATURATION: 97 % | HEIGHT: 70 IN | SYSTOLIC BLOOD PRESSURE: 130 MMHG | DIASTOLIC BLOOD PRESSURE: 74 MMHG | TEMPERATURE: 99.1 F

## 2021-12-17 DIAGNOSIS — R31.9 URINARY TRACT INFECTION WITH HEMATURIA, SITE UNSPECIFIED: ICD-10-CM

## 2021-12-17 DIAGNOSIS — N39.0 URINARY TRACT INFECTION WITH HEMATURIA, SITE UNSPECIFIED: ICD-10-CM

## 2021-12-17 DIAGNOSIS — R30.0 DYSURIA: Primary | ICD-10-CM

## 2021-12-17 DIAGNOSIS — R30.0 DYSURIA: ICD-10-CM

## 2021-12-17 LAB
BILIRUBIN, POC: NORMAL
BLOOD URINE, POC: NORMAL
CLARITY, POC: CLEAR
COLOR, POC: YELLOW
GLUCOSE URINE, POC: NORMAL
KETONES, POC: NORMAL
LEUKOCYTE EST, POC: NORMAL
NITRITE, POC: POSITIVE
PH, POC: 5.5
PROTEIN, POC: NORMAL
SPECIFIC GRAVITY, POC: >=1.03
UROBILINOGEN, POC: 0.2

## 2021-12-17 PROCEDURE — 99214 OFFICE O/P EST MOD 30 MIN: CPT | Performed by: PHYSICIAN ASSISTANT

## 2021-12-17 PROCEDURE — 81002 URINALYSIS NONAUTO W/O SCOPE: CPT | Performed by: PHYSICIAN ASSISTANT

## 2021-12-17 RX ORDER — FLUCONAZOLE 150 MG/1
TABLET ORAL
Qty: 2 TABLET | Refills: 0 | Status: SHIPPED
Start: 2021-12-17 | End: 2022-03-04 | Stop reason: ALTCHOICE

## 2021-12-17 RX ORDER — CEFDINIR 300 MG/1
300 CAPSULE ORAL 2 TIMES DAILY
Qty: 14 CAPSULE | Refills: 0 | Status: SHIPPED | OUTPATIENT
Start: 2021-12-17 | End: 2021-12-24

## 2021-12-17 NOTE — PROGRESS NOTES
21  Patricia Walker : 1975 Sex: female  Age 40 y.o. Subjective:  Chief Complaint   Patient presents with    Urinary Tract Infection     burning when urinating, frequently urinating. Started last Friday     Lower Back Pain         HPI:   Patricia Walker , 40 y.o. female presents to [facility] care for evaluation of possible urinary tract infection    HPI  16-year-old female presents to [facility] for evaluation of a urinary tract infection. The patient has symptoms similar to previous UTI. The patient has some increased frequency, urgency and burning with urination. The patient started with the symptoms last Friday. Patient has had some lower abdominal discomfort. Seem to get a little bit better. Patient is having some low back pain.,  No fever, no chills. Thought that she did have a yeast infection did use Monistat. This did help minimally. ROS:   Unless otherwise stated in this report the patient's positive and negative responses for review of systems for constitutional, eyes, ENT, cardiovascular, respiratory, gastrointestinal, neurological, , musculoskeletal, and integument systems and related systems to the presenting problem are either stated in the history of present illness or were not pertinent or were negative for the symptoms and/or complaints related to the presenting medical problem. Positives and pertinent negatives as per HPI. All others reviewed and are negative. PMH:     Past Medical History:   Diagnosis Date    Depression     Diabetes mellitus ([address]     GERD (gastroesophageal reflux disease)     Joint pain     Thyroid disease        Past Surgical History:   Procedure Laterality Date     SECTION      CHOLECYSTECTOMY      HYSTERECTOMY         No family history on file.     Medications:     Current Outpatient Medications:     cefdinir (OMNICEF) 300 MG capsule, Take 1 capsule by mouth 2 times daily for 7 days, Disp: 14 capsule, Rfl: 0   fluconazole (DIFLUCAN) 150 MG tablet, Take 1 now and then another at the end of the course of the antibiotic, Disp: 2 tablet, Rfl: 0    Insulin Degludec (TRESIBA FLEXTOUCH) 100 UNIT/ML SOPN, Inject 30 Units into the skin daily, Disp: 10 pen, Rfl: 3    Insulin Aspart, w/Niacinamide, (FIASP FLEXTOUCH) 100 UNIT/ML SOPN, 10 units before meals plus a scale. A max of 50 units/day, Disp: 15 pen, Rfl: 3    Insulin Pen Needle (BD PEN NEEDLE MICRO U/F) 32G X 6 MM MISC, Uses with insulin daily, Disp: 150 each, Rfl: 5    Blood Glucose Monitoring Suppl (ONETOUCH VERIO REFLECT) w/Device KIT, TEST 4 TIMES A DAY, Disp: 1 kit, Rfl: 5    blood glucose test strips (ONETOUCH VERIO) strip, 1 each by In Vitro route 4 times daily As needed. , Disp: 150 each, Rfl: 11    Lancets (Isatu Coco) MISC, To test 4x/day, Disp: 200 each, Rfl: 11    Continuous Blood Gluc Sensor (DEXCOM G6 SENSOR) MISC, To change every 10 days, Disp: 9 each, Rfl: 3    Continuous Blood Gluc Transmit (DEXCOM G6 TRANSMITTER) MISC, To change every 90 days, Disp: 1 each, Rfl: 3    Continuous Blood Gluc  (DEXCOM G6 ) JACQUI, To use with sensors, Disp: 1 Device, Rfl: 0    chlorpheniramine (ALLER-CHLOR) 4 MG tablet, Take 1 tablet by mouth every 6 hours as needed for Allergies, Disp: 20 tablet, Rfl: 0    levothyroxine (SYNTHROID) 75 MCG tablet, Take 1 tablet by mouth Daily, Disp: 90 tablet, Rfl: 3    metFORMIN (GLUCOPHAGE-XR) 500 MG extended release tablet, Take 2 tablets by mouth 2 times daily, Disp: 360 tablet, Rfl: 3    glipiZIDE (GLUCOTROL XL) 10 MG extended release tablet, TAKE 1 TABLET BY MOUTH TWICE A DAY, Disp: 180 tablet, Rfl: 5    VORTIoxetine (TRINTELLIX) 10 MG TABS tablet, Take 1 tablet by mouth daily, Disp: 30 tablet, Rfl: 0    albuterol sulfate  (90 Base) MCG/ACT inhaler, 1-2 puffs q4-6 hrs prn, Disp: 1 Inhaler, Rfl: 0    cetirizine (ZYRTEC) 10 MG tablet, Take 1 tablet by mouth daily, Disp: 30 tablet, Rfl: 1    rosuvastatin (CRESTOR) 10 MG tablet, Take 1 tablet by mouth daily, Disp: 30 tablet, Rfl: 3    Blood Glucose Monitoring Suppl MISC, Blood glucose meter - use as directed, Disp: 1 each, Rfl: 1    Lancets MISC, Use to test blood sugar 3 times a day, Disp: 100 each, Rfl: 5    aspirin 81 MG EC tablet, Take 81 mg by mouth daily, Disp: , Rfl:     B Complex-C (SUPER B COMPLEX/VITAMIN C PO), Take by mouth daily, Disp: , Rfl:     loratadine (CLARITIN) 10 MG tablet, Take 10 mg by mouth daily, Disp: , Rfl:     omeprazole (PRILOSEC) 20 MG delayed release capsule, Take 1 capsule by mouth daily, Disp: 90 capsule, Rfl: 3    Cholecalciferol (VITAMIN D3) 2000 units CAPS, Take 2,000 Units by mouth daily , Disp: , Rfl:     Allergies: Allergies   Allergen Reactions    Celecoxib Hives       Social History:     Social History     Tobacco Use    Smoking status: Never Smoker    Smokeless tobacco: Never Used   Substance Use Topics    Alcohol use: No    Drug use: No       Patient lives at home. Physical Exam:     Vitals:    12/17/21 1259   BP: 130/74   Pulse: 96   Temp: 99.1 °F (37.3 °C)   SpO2: 97%   Weight: 258 lb (117 kg)   Height: 5' 10\" (1.778 m)       Exam:  Physical Exam  Nurses note and vital signs reviewed and patient is not hypoxic. General: The patient appears well and in no apparent distress. Patient is resting comfortably on cart. Skin: Warm, dry, no pallor noted. There is no rash noted. Head: Normocephalic, atraumatic. Eye: Normal conjunctiva  Ears, Nose, Mouth, and Throat: Oral mucosa is moist  Cardiovascular: Regular Rate and Rhythm  Respiratory: Patient is in no distress, no accessory muscle use, lungs are clear to auscultation, no wheezing, crackles or rhonchi  Back: Non-tender, no CVA tenderness bilaterally to percussion. GI: Normal bowel sounds, no tenderness to palpation, no masses appreciated. No rebound, guarding, or rigidity noted.   Musculoskeletal: The patient has no evidence of calf tenderness, no pitting edema, symmetrical pulses noted bilaterally  Neurological: A&O x4, normal speech        Testing:     Results for orders placed or performed in visit on 12/17/21   POCT Urinalysis no Micro   Result Value Ref Range    Color, UA yellow     Clarity, UA clear     Glucose, UA  mg     Bilirubin, UA neg     Ketones, UA trace     Spec Grav, UA >=1.030     Blood, UA POC neg     pH, UA 5.5     Protein, UA POC neg     Urobilinogen, UA 0.2     Leukocytes, UA neg     Nitrite, UA positive            Medical Decision Making:     Vital signs reviewed    Past medical history reviewed. Allergies reviewed. Medications reviewed. Patient on arrival does not appear to be in any apparent distress or discomfort. The patient has been seen and evaluated. The patient does not appear to be toxic or lethargic. Urinalysis did show evidence of positive nitrite. The patient does not have any blood or leukocytes present. The patient does have elevated specific gravity. The patient did have evidence of glucose present. We will treat the patient with cefdinir. We will set up a urine culture. The patient will be given Diflucan also. The patient is to push fluids get plenty of rest.  The patient is to return if any of the signs or symptoms worsen or go directly to the emergency department    The patient is to return to express care or go directly to the emergency department should any of the signs or symptoms worsen. The patient is to followup with primary care physician in 2-3 days for repeat evaluation. The patient has no other questions or concerns at this time the patient will be discharged home. Clinical Impression:   Maryjo Roque was seen today for urinary tract infection and lower back pain. Diagnoses and all orders for this visit:    Dysuria  -     Cancel: POCT Urinalysis no Micro  -     POCT Urinalysis no Micro  -     Culture, Urine;  Future    Urinary tract infection with hematuria, site unspecified    Other orders  -     cefdinir (OMNICEF) 300 MG capsule; Take 1 capsule by mouth 2 times daily for 7 days  -     fluconazole (DIFLUCAN) 150 MG tablet; Take 1 now and then another at the end of the course of the antibiotic        The patient is to call for any concerns or return if any of the signs or symptoms worsen. The patient is to follow-up with PCP in the next 2-3 days for repeat evaluation repeat assessment or go directly to the emergency department.      SIGNATURE: Jack Bowen III, PA-C

## 2021-12-20 LAB
ORGANISM: ABNORMAL
URINE CULTURE, ROUTINE: ABNORMAL

## 2022-02-07 ENCOUNTER — HOSPITAL ENCOUNTER (EMERGENCY)
Age: 47
Discharge: HOME | End: 2022-02-08
Payer: COMMERCIAL

## 2022-02-07 ENCOUNTER — APPOINTMENT (OUTPATIENT)
Dept: CT IMAGING | Age: 47
End: 2022-02-07
Payer: COMMERCIAL

## 2022-02-07 DIAGNOSIS — N39.0 URINARY TRACT INFECTION WITH HEMATURIA, SITE UNSPECIFIED: ICD-10-CM

## 2022-02-07 DIAGNOSIS — R31.9 URINARY TRACT INFECTION WITH HEMATURIA, SITE UNSPECIFIED: ICD-10-CM

## 2022-02-07 DIAGNOSIS — N20.1 CALCULUS OF PROXIMAL LEFT URETER: Primary | ICD-10-CM

## 2022-02-07 LAB
BACTERIA: ABNORMAL /HPF
BILIRUBIN URINE: NEGATIVE
BLOOD, URINE: ABNORMAL
CLARITY: ABNORMAL
COLOR: YELLOW
EPITHELIAL CELLS, UA: ABNORMAL /HPF
GLUCOSE URINE: >=1000 MG/DL
HCG(URINE) PREGNANCY TEST: NEGATIVE
KETONES, URINE: ABNORMAL MG/DL
LEUKOCYTE ESTERASE, URINE: ABNORMAL
NITRITE, URINE: NEGATIVE
PH UA: 6 (ref 5–9)
PROTEIN UA: 30 MG/DL
RBC UA: ABNORMAL /HPF (ref 0–2)
SPECIFIC GRAVITY UA: 1.02 (ref 1–1.03)
UROBILINOGEN, URINE: 0.2 E.U./DL
WBC UA: >20 /HPF (ref 0–5)

## 2022-02-07 PROCEDURE — 81025 URINE PREGNANCY TEST: CPT

## 2022-02-07 PROCEDURE — 81001 URINALYSIS AUTO W/SCOPE: CPT

## 2022-02-07 PROCEDURE — 96375 TX/PRO/DX INJ NEW DRUG ADDON: CPT

## 2022-02-07 PROCEDURE — 99284 EMERGENCY DEPT VISIT MOD MDM: CPT

## 2022-02-07 PROCEDURE — 96374 THER/PROPH/DIAG INJ IV PUSH: CPT

## 2022-02-07 PROCEDURE — 74176 CT ABD & PELVIS W/O CONTRAST: CPT

## 2022-02-07 RX ORDER — MORPHINE SULFATE 4 MG/ML
4 INJECTION, SOLUTION INTRAMUSCULAR; INTRAVENOUS ONCE
Status: COMPLETED | OUTPATIENT
Start: 2022-02-08 | End: 2022-02-08

## 2022-02-07 RX ORDER — ONDANSETRON 2 MG/ML
4 INJECTION INTRAMUSCULAR; INTRAVENOUS ONCE
Status: COMPLETED | OUTPATIENT
Start: 2022-02-08 | End: 2022-02-08

## 2022-02-07 RX ORDER — 0.9 % SODIUM CHLORIDE 0.9 %
1000 INTRAVENOUS SOLUTION INTRAVENOUS ONCE
Status: COMPLETED | OUTPATIENT
Start: 2022-02-08 | End: 2022-02-08

## 2022-02-07 ASSESSMENT — PAIN SCALES - GENERAL: PAINLEVEL_OUTOF10: 7

## 2022-02-07 NOTE — Clinical Note
Ronel Su was seen and treated in our emergency department on 2/7/2022. She may return to work on 02/10/2022. If you have any questions or concerns, please don't hesitate to call.       Peng Brambila PA-C

## 2022-02-08 VITALS
HEART RATE: 98 BPM | DIASTOLIC BLOOD PRESSURE: 86 MMHG | OXYGEN SATURATION: 97 % | WEIGHT: 260 LBS | TEMPERATURE: 98.4 F | RESPIRATION RATE: 20 BRPM | BODY MASS INDEX: 37.31 KG/M2 | SYSTOLIC BLOOD PRESSURE: 157 MMHG

## 2022-02-08 LAB
ANION GAP SERPL CALCULATED.3IONS-SCNC: 15 MMOL/L (ref 7–16)
BASOPHILS ABSOLUTE: 0.05 E9/L (ref 0–0.2)
BASOPHILS RELATIVE PERCENT: 0.3 % (ref 0–2)
BUN BLDV-MCNC: 13 MG/DL (ref 6–20)
CALCIUM SERPL-MCNC: 9.7 MG/DL (ref 8.6–10.2)
CHLORIDE BLD-SCNC: 100 MMOL/L (ref 98–107)
CO2: 20 MMOL/L (ref 22–29)
CREAT SERPL-MCNC: 0.6 MG/DL (ref 0.5–1)
EOSINOPHILS ABSOLUTE: 0.04 E9/L (ref 0.05–0.5)
EOSINOPHILS RELATIVE PERCENT: 0.3 % (ref 0–6)
GFR AFRICAN AMERICAN: >60
GFR NON-AFRICAN AMERICAN: >60 ML/MIN/1.73
GLUCOSE BLD-MCNC: 303 MG/DL (ref 74–99)
HCT VFR BLD CALC: 43.2 % (ref 34–48)
HEMOGLOBIN: 13.6 G/DL (ref 11.5–15.5)
IMMATURE GRANULOCYTES #: 0.07 E9/L
IMMATURE GRANULOCYTES %: 0.5 % (ref 0–5)
LYMPHOCYTES ABSOLUTE: 2.01 E9/L (ref 1.5–4)
LYMPHOCYTES RELATIVE PERCENT: 14 % (ref 20–42)
MCH RBC QN AUTO: 23.9 PG (ref 26–35)
MCHC RBC AUTO-ENTMCNC: 31.5 % (ref 32–34.5)
MCV RBC AUTO: 75.8 FL (ref 80–99.9)
MONOCYTES ABSOLUTE: 0.97 E9/L (ref 0.1–0.95)
MONOCYTES RELATIVE PERCENT: 6.8 % (ref 2–12)
NEUTROPHILS ABSOLUTE: 11.21 E9/L (ref 1.8–7.3)
NEUTROPHILS RELATIVE PERCENT: 78.1 % (ref 43–80)
PDW BLD-RTO: 17 FL (ref 11.5–15)
PLATELET # BLD: 318 E9/L (ref 130–450)
PMV BLD AUTO: 9.3 FL (ref 7–12)
POTASSIUM SERPL-SCNC: 4.1 MMOL/L (ref 3.5–5)
RBC # BLD: 5.7 E12/L (ref 3.5–5.5)
SODIUM BLD-SCNC: 135 MMOL/L (ref 132–146)
WBC # BLD: 14.4 E9/L (ref 4.5–11.5)

## 2022-02-08 PROCEDURE — 6360000002 HC RX W HCPCS: Performed by: PHYSICIAN ASSISTANT

## 2022-02-08 PROCEDURE — 6370000000 HC RX 637 (ALT 250 FOR IP)

## 2022-02-08 PROCEDURE — 2580000003 HC RX 258: Performed by: PHYSICIAN ASSISTANT

## 2022-02-08 PROCEDURE — 80048 BASIC METABOLIC PNL TOTAL CA: CPT

## 2022-02-08 PROCEDURE — 85025 COMPLETE CBC W/AUTO DIFF WBC: CPT

## 2022-02-08 RX ORDER — TAMSULOSIN HYDROCHLORIDE 0.4 MG/1
0.4 CAPSULE ORAL NIGHTLY
Qty: 5 CAPSULE | Refills: 0 | Status: SHIPPED | OUTPATIENT
Start: 2022-02-08 | End: 2022-07-12

## 2022-02-08 RX ORDER — OXYCODONE HYDROCHLORIDE AND ACETAMINOPHEN 5; 325 MG/1; MG/1
1 TABLET ORAL ONCE
Status: COMPLETED | OUTPATIENT
Start: 2022-02-08 | End: 2022-02-08

## 2022-02-08 RX ORDER — CEFTRIAXONE 1 G/1
INJECTION, POWDER, FOR SOLUTION INTRAMUSCULAR; INTRAVENOUS
Status: DISCONTINUED
Start: 2022-02-08 | End: 2022-02-08 | Stop reason: HOSPADM

## 2022-02-08 RX ORDER — ONDANSETRON 4 MG/1
4 TABLET, FILM COATED ORAL EVERY 8 HOURS PRN
Qty: 15 TABLET | Refills: 0 | Status: SHIPPED | OUTPATIENT
Start: 2022-02-08 | End: 2022-02-13

## 2022-02-08 RX ORDER — OXYCODONE HYDROCHLORIDE AND ACETAMINOPHEN 5; 325 MG/1; MG/1
1 TABLET ORAL EVERY 6 HOURS PRN
Qty: 12 TABLET | Refills: 0 | Status: SHIPPED | OUTPATIENT
Start: 2022-02-08 | End: 2022-02-11

## 2022-02-08 RX ORDER — CEFDINIR 300 MG/1
300 CAPSULE ORAL 2 TIMES DAILY
Qty: 20 CAPSULE | Refills: 0 | Status: SHIPPED | OUTPATIENT
Start: 2022-02-08 | End: 2022-02-18

## 2022-02-08 RX ORDER — OXYCODONE HYDROCHLORIDE AND ACETAMINOPHEN 5; 325 MG/1; MG/1
TABLET ORAL
Status: COMPLETED
Start: 2022-02-08 | End: 2022-02-08

## 2022-02-08 RX ORDER — IBUPROFEN 800 MG/1
800 TABLET ORAL EVERY 6 HOURS PRN
Qty: 20 TABLET | Refills: 0 | Status: SHIPPED | OUTPATIENT
Start: 2022-02-08 | End: 2022-10-25

## 2022-02-08 RX ADMIN — ONDANSETRON 4 MG: 2 INJECTION INTRAMUSCULAR; INTRAVENOUS at 00:28

## 2022-02-08 RX ADMIN — OXYCODONE AND ACETAMINOPHEN 1 TABLET: 5; 325 TABLET ORAL at 01:54

## 2022-02-08 RX ADMIN — MORPHINE SULFATE 4 MG: 4 INJECTION, SOLUTION INTRAMUSCULAR; INTRAVENOUS at 00:27

## 2022-02-08 RX ADMIN — OXYCODONE HYDROCHLORIDE AND ACETAMINOPHEN 1 TABLET: 5; 325 TABLET ORAL at 01:54

## 2022-02-08 RX ADMIN — SODIUM CHLORIDE 1000 ML: 9 INJECTION, SOLUTION INTRAVENOUS at 00:29

## 2022-02-08 RX ADMIN — CEFTRIAXONE 1000 MG: 1 INJECTION, POWDER, FOR SOLUTION INTRAMUSCULAR; INTRAVENOUS at 01:04

## 2022-02-08 ASSESSMENT — PAIN SCALES - GENERAL
PAINLEVEL_OUTOF10: 9
PAINLEVEL_OUTOF10: 7

## 2022-02-08 NOTE — ED PROVIDER NOTES
[facility]  Department of Emergency Medicine   ED  Encounter Note  Admit Date/RoomTime: 2022 11:18 PM  ED Room:     NAME: Lian Huffman  : 1975  MRN: [id number]     Chief Complaint:  Flank Pain and Dysuria    History of Present Illness       Lian Huffman is a 40 y.o. old female who presents to the emergency department by private vehicle, for complaints of sudden onset of pain in the LUQ and in the left flank without radiation which began 1 day(s) prior to arrival.  Since onset the symptoms have been persistent and worsening and moderate to severe in severity. Lian Huffman has no history of uti or kidney stone. Associated sign's & symptoms: nausea and vomiting. The symptoms are aggravated by nothing and relieved by nothing pt has been using motrin. .  ROS   Pertinent positives and negatives are stated within HPI, all other systems reviewed and are negative. Past Medical History:  has a past medical history of Depression, Diabetes mellitus ([address] GERD (gastroesophageal reflux disease), Joint pain, and Thyroid disease. Surgical History:  has a past surgical history that includes Hysterectomy; Cholecystectomy; and  section. Social History:  reports that she has never smoked. She has never used smokeless tobacco. She reports that she does not drink alcohol and does not use drugs. Family History: family history is not on file. Allergies: Celecoxib    Physical Exam   Oxygen Saturation Interpretation: Normal.        ED Triage Vitals [22 2104]   BP Temp Temp Source Pulse Resp SpO2 Height Weight   (!) 191/110 98.4 °F (36.9 °C) Oral 99 22 98 % -- 260 lb (117.9 kg)         Constitutional:  Alert, development consistent with age. HEENT:  NC/NT. Airway patent. Oral mucosa moist.   Neck:  Normal ROM. Supple.   Respiratory:  Clear to auscultation and breath sounds equal.  CV:  Regular rate and rhythm, normal heart sounds, without pathological murmurs, ectopy, gallops, or rubs. GI:  normal appearing, non-distended with no visible hernias. Bowel sounds: normal bowel sounds. Tenderness: There is moderate tenderness present - located in the LUQ., There is no rebound tenderness. , There is no guarding. , There is no distension. , There is no pulsatile mass. .        Liver: non-tender. Spleen:  non-tender. /Back: CVA Tenderness: No CVA tenderness. Integument:  Normal turgor. Warm, dry, without visible rash, unless noted elsewhere. Lymphatics: No lymphangitis or adenopathy noted. Neurological:  Oriented. Motor functions intact.     Lab / Imaging Results   (All laboratory and radiology results have been personally reviewed by myself)  Labs:  Results for orders placed or performed during the hospital encounter of 02/07/22   URINALYSIS   Result Value Ref Range    Color, UA Yellow Straw/Yellow    Clarity, UA SL CLOUDY Clear    Glucose, Ur >=1000 (A) Negative mg/dL    Bilirubin Urine Negative Negative    Ketones, Urine TRACE (A) Negative mg/dL    Specific Gravity, UA 1.020 1.005 - 1.030    Blood, Urine MODERATE (A) Negative    pH, UA 6.0 5.0 - 9.0    Protein, UA 30 (A) Negative mg/dL    Urobilinogen, Urine 0.2 <2.0 E.U./dL    Nitrite, Urine Negative Negative    Leukocyte Esterase, Urine SMALL (A) Negative   Pregnancy, urine   Result Value Ref Range    HCG(Urine) Pregnancy Test NEGATIVE NEGATIVE   Microscopic Urinalysis   Result Value Ref Range    WBC, UA >20 (A) 0 - 5 /HPF    RBC, UA 2-5 0 - 2 /HPF    Epithelial Cells, UA FEW /HPF    Bacteria, UA MANY (A) None Seen /HPF   CBC Auto Differential   Result Value Ref Range    WBC 14.4 (H) 4.5 - 11.5 E9/L    RBC 5.70 (H) 3.50 - 5.50 E12/L    Hemoglobin 13.6 11.5 - 15.5 g/dL    Hematocrit 43.2 34.0 - 48.0 %    MCV 75.8 (L) 80.0 - 99.9 fL    MCH 23.9 (L) 26.0 - 35.0 pg    MCHC 31.5 (L) 32.0 - 34.5 %    RDW 17.0 (H) 11.5 - 15.0 fL    Platelets 648 858 - 295 E9/L    MPV 9.3 7.0 - 12.0 fL    Neutrophils % 78.1 43.0 - 80.0 %    Immature Granulocytes % 0.5 0.0 - 5.0 %    Lymphocytes % 14.0 (L) 20.0 - 42.0 %    Monocytes % 6.8 2.0 - 12.0 %    Eosinophils % 0.3 0.0 - 6.0 %    Basophils % 0.3 0.0 - 2.0 %    Neutrophils Absolute 11.21 (H) 1.80 - 7.30 E9/L    Immature Granulocytes # 0.07 E9/L    Lymphocytes Absolute 2.01 1.50 - 4.00 E9/L    Monocytes Absolute 0.97 (H) 0.10 - 0.95 E9/L    Eosinophils Absolute 0.04 (L) 0.05 - 0.50 E9/L    Basophils Absolute 0.05 0.00 - 0.20 I2/N   Basic Metabolic Panel   Result Value Ref Range    Sodium 135 132 - 146 mmol/L    Potassium 4.1 3.5 - 5.0 mmol/L    Chloride 100 98 - 107 mmol/L    CO2 20 (L) 22 - 29 mmol/L    Anion Gap 15 7 - 16 mmol/L    Glucose 303 (H) 74 - 99 mg/dL    BUN 13 6 - 20 mg/dL    CREATININE 0.6 0.5 - 1.0 mg/dL    GFR Non-African American >60 >=60 mL/min/1.73    GFR African American >60     Calcium 9.7 8.6 - 10.2 mg/dL       Imaging: All Radiology results interpreted by Radiologist unless otherwise noted. CT ABDOMEN PELVIS WO CONTRAST Additional Contrast? None   Final Result   4 mm obstructing stone in proximal left ureter causes mild left   hydronephrosis. Provided history indicates right flank pain although   findings are on the left. ED Course / Medical Decision Making     Medications   cefTRIAXone (ROCEPHIN) 1 g injection (has no administration in time range)   0.9 % sodium chloride bolus (0 mLs IntraVENous Stopped 2/8/22 0107)   ondansetron (ZOFRAN) injection 4 mg (4 mg IntraVENous Given 2/8/22 0028)   morphine sulfate (PF) injection 4 mg (4 mg IntraVENous Given 2/8/22 0027)   cefTRIAXone (ROCEPHIN) 1,000 mg in sterile water 10 mL IV syringe (0 mg IntraVENous Stopped 2/8/22 0108)   oxyCODONE-acetaminophen (PERCOCET) 5-325 MG per tablet 1 tablet (1 tablet Oral Given 2/8/22 0154)     Re-examination:  2/8/22         Patients condition is improving after treatment. Consult(s):   None    Procedure(s):   None.     MDM:   PT presents to ED with left flank pain, NV. She reports dysuria and urinary frequency, denies hematuria. CT finding show 4 mm left proximal ureteric stone and positive UTI on UA with slight elevation in WBC. Pt is hemodynamically stable, no signs of sepsis. She had pain control in ED with morphine and nausea control with Zofran. She received 1 gram of Rocephin in ED and will be discharged home with medications to manage symptoms and atb for uti. She is strongly cautioned to return to ED if any worsening of this condition happens, particularly development of fevers, uncontrolled vomiting, shaking chills. Plan of Care/Counseling:  Randol Hatchet, PA-C reviewed today's visit with the patient in addition to providing specific details for the plan of care and counseling regarding the diagnosis and prognosis. Questions are answered at this time and are agreeable with the plan. Assessment      1. Calculus of proximal left ureter    2. Urinary tract infection with hematuria, site unspecified      Plan   Discharged home. Patient condition is stable    New Medications     Discharge Medication List as of 2/8/2022  1:41 AM      START taking these medications    Details   oxyCODONE-acetaminophen (PERCOCET) 5-325 MG per tablet Take 1 tablet by mouth every 6 hours as needed for Pain for up to 3 days. , Disp-12 tablet, R-0Print      tamsulosin (FLOMAX) 0.4 MG capsule Take 1 capsule by mouth nightly for 5 days, Disp-5 capsule, R-0Normal      cefdinir (OMNICEF) 300 MG capsule Take 1 capsule by mouth 2 times daily for 10 days, Disp-20 capsule, R-0Normal      ondansetron (ZOFRAN) 4 MG tablet Take 1 tablet by mouth every 8 hours as needed for Nausea or Vomiting, Disp-15 tablet, R-0Normal      ibuprofen (ADVIL;MOTRIN) 800 MG tablet Take 1 tablet by mouth every 6 hours as needed for Pain, Disp-20 tablet, R-0Normal           Electronically signed by Randol Hatchet, PA-C   DD: 2/8/22  **This report was transcribed using voice recognition

## 2022-03-04 ENCOUNTER — OFFICE VISIT (OUTPATIENT)
Dept: FAMILY MEDICINE CLINIC | Age: 47
End: 2022-03-04
Payer: COMMERCIAL

## 2022-03-04 VITALS
TEMPERATURE: 97 F | HEART RATE: 102 BPM | BODY MASS INDEX: 38.31 KG/M2 | SYSTOLIC BLOOD PRESSURE: 122 MMHG | OXYGEN SATURATION: 99 % | WEIGHT: 267 LBS | DIASTOLIC BLOOD PRESSURE: 62 MMHG

## 2022-03-04 DIAGNOSIS — R31.9 URINARY TRACT INFECTION WITH HEMATURIA, SITE UNSPECIFIED: Primary | ICD-10-CM

## 2022-03-04 DIAGNOSIS — N39.0 URINARY TRACT INFECTION WITH HEMATURIA, SITE UNSPECIFIED: Primary | ICD-10-CM

## 2022-03-04 DIAGNOSIS — R30.0 DYSURIA: ICD-10-CM

## 2022-03-04 DIAGNOSIS — R35.0 URINARY FREQUENCY: ICD-10-CM

## 2022-03-04 LAB
BILIRUBIN, POC: NORMAL
BLOOD URINE, POC: NORMAL
CHP ED QC CHECK: NORMAL
CLARITY, POC: NORMAL
COLOR, POC: YELLOW
CONTROL: NORMAL
GLUCOSE BLD-MCNC: 258 MG/DL
GLUCOSE URINE, POC: NORMAL
KETONES, POC: NORMAL
LEUKOCYTE EST, POC: NORMAL
NITRITE, POC: POSITIVE
PH, POC: 5.5
PREGNANCY TEST URINE, POC: NORMAL
PROTEIN, POC: NORMAL
SPECIFIC GRAVITY, POC: 1.02
UROBILINOGEN, POC: NORMAL

## 2022-03-04 PROCEDURE — 81002 URINALYSIS NONAUTO W/O SCOPE: CPT | Performed by: PHYSICIAN ASSISTANT

## 2022-03-04 PROCEDURE — 99214 OFFICE O/P EST MOD 30 MIN: CPT | Performed by: PHYSICIAN ASSISTANT

## 2022-03-04 PROCEDURE — 82962 GLUCOSE BLOOD TEST: CPT | Performed by: PHYSICIAN ASSISTANT

## 2022-03-04 PROCEDURE — 81025 URINE PREGNANCY TEST: CPT | Performed by: PHYSICIAN ASSISTANT

## 2022-03-04 RX ORDER — FLUCONAZOLE 150 MG/1
TABLET ORAL
Qty: 2 TABLET | Refills: 0 | Status: SHIPPED
Start: 2022-03-04 | End: 2022-07-12

## 2022-03-04 RX ORDER — CEFDINIR 300 MG/1
300 CAPSULE ORAL 2 TIMES DAILY
Qty: 20 CAPSULE | Refills: 0 | Status: SHIPPED | OUTPATIENT
Start: 2022-03-04 | End: 2022-03-14

## 2022-03-04 NOTE — PROGRESS NOTES
3/4/22  Julian Mccrary : 1975 Sex: female  Age 40 y.o. Subjective:  Chief Complaint   Patient presents with    Urinary Tract Infection     1w          HPI:   Julian Mccrary , 40 y.o. female presents to [facility] care for evaluation of urinary tract infection    HPI  76-year-old female presents to [facility] for evaluation possible contraception. The patient has had the symptoms ongoing for about a week or so. The patient has had some increased frequency, urgency, burning with urination. The patient states that 2 weeks ago she was in the emergency department with a kidney stone. Is not really having any vomiting or diarrhea. Patient states that she really has not checked her blood sugars today. ROS:   Unless otherwise stated in this report the patient's positive and negative responses for review of systems for constitutional, eyes, ENT, cardiovascular, respiratory, gastrointestinal, neurological, , musculoskeletal, and integument systems and related systems to the presenting problem are either stated in the history of present illness or were not pertinent or were negative for the symptoms and/or complaints related to the presenting medical problem. Positives and pertinent negatives as per HPI. All others reviewed and are negative. PMH:     Past Medical History:   Diagnosis Date    Depression     Diabetes mellitus ([address]     GERD (gastroesophageal reflux disease)     Joint pain     Thyroid disease        Past Surgical History:   Procedure Laterality Date     SECTION      CHOLECYSTECTOMY      HYSTERECTOMY         History reviewed. No pertinent family history.     Medications:     Current Outpatient Medications:     cefdinir (OMNICEF) 300 MG capsule, Take 1 capsule by mouth 2 times daily for 10 days, Disp: 20 capsule, Rfl: 0    fluconazole (DIFLUCAN) 150 MG tablet, Take 1 now and then another at the end of the course of the antibiotic, Disp: 2 tablet, Rfl: 0   tamsulosin (FLOMAX) 0.4 MG capsule, Take 1 capsule by mouth nightly for 5 days, Disp: 5 capsule, Rfl: 0    ibuprofen (ADVIL;MOTRIN) 800 MG tablet, Take 1 tablet by mouth every 6 hours as needed for Pain, Disp: 20 tablet, Rfl: 0    Insulin Degludec (TRESIBA FLEXTOUCH) 100 UNIT/ML SOPN, Inject 30 Units into the skin daily, Disp: 10 pen, Rfl: 3    Insulin Aspart, w/Niacinamide, (FIASP FLEXTOUCH) 100 UNIT/ML SOPN, 10 units before meals plus a scale. A max of 50 units/day, Disp: 15 pen, Rfl: 3    Insulin Pen Needle (BD PEN NEEDLE MICRO U/F) 32G X 6 MM MISC, Uses with insulin daily, Disp: 150 each, Rfl: 5    Blood Glucose Monitoring Suppl (ONETOUCH VERIO REFLECT) w/Device KIT, TEST 4 TIMES A DAY, Disp: 1 kit, Rfl: 5    blood glucose test strips (ONETOUCH VERIO) strip, 1 each by In Vitro route 4 times daily As needed. , Disp: 150 each, Rfl: 11    Lancets ([address]) MISC, To test 4x/day, Disp: 200 each, Rfl: 11    Continuous Blood Gluc Sensor (DEXCOM G6 SENSOR) MISC, To change every 10 days, Disp: 9 each, Rfl: 3    Continuous Blood Gluc Transmit (DEXCOM G6 TRANSMITTER) MISC, To change every 90 days, Disp: 1 each, Rfl: 3    Continuous Blood Gluc  (DEXCOM G6 ) JACQUI, To use with sensors, Disp: 1 Device, Rfl: 0    chlorpheniramine (ALLER-CHLOR) 4 MG tablet, Take 1 tablet by mouth every 6 hours as needed for Allergies, Disp: 20 tablet, Rfl: 0    levothyroxine (SYNTHROID) 75 MCG tablet, Take 1 tablet by mouth Daily, Disp: 90 tablet, Rfl: 3    metFORMIN (GLUCOPHAGE-XR) 500 MG extended release tablet, Take 2 tablets by mouth 2 times daily, Disp: 360 tablet, Rfl: 3    glipiZIDE (GLUCOTROL XL) 10 MG extended release tablet, TAKE 1 TABLET BY MOUTH TWICE A DAY, Disp: 180 tablet, Rfl: 5    VORTIoxetine (TRINTELLIX) 10 MG TABS tablet, Take 1 tablet by mouth daily, Disp: 30 tablet, Rfl: 0    albuterol sulfate  (90 Base) MCG/ACT inhaler, 1-2 puffs q4-6 hrs prn, Disp: 1 Inhaler, Rfl: 0    cetirizine (ZYRTEC) 10 MG tablet, Take 1 tablet by mouth daily, Disp: 30 tablet, Rfl: 1    rosuvastatin (CRESTOR) 10 MG tablet, Take 1 tablet by mouth daily, Disp: 30 tablet, Rfl: 3    Blood Glucose Monitoring Suppl MISC, Blood glucose meter - use as directed, Disp: 1 each, Rfl: 1    Lancets MISC, Use to test blood sugar 3 times a day, Disp: 100 each, Rfl: 5    aspirin 81 MG EC tablet, Take 81 mg by mouth daily, Disp: , Rfl:     B Complex-C (SUPER B COMPLEX/VITAMIN C PO), Take by mouth daily, Disp: , Rfl:     loratadine (CLARITIN) 10 MG tablet, Take 10 mg by mouth daily, Disp: , Rfl:     omeprazole (PRILOSEC) 20 MG delayed release capsule, Take 1 capsule by mouth daily, Disp: 90 capsule, Rfl: 3    Cholecalciferol (VITAMIN D3) 2000 units CAPS, Take 2,000 Units by mouth daily , Disp: , Rfl:     Allergies: Allergies   Allergen Reactions    Celecoxib Hives       Social History:     Social History     Tobacco Use    Smoking status: Never Smoker    Smokeless tobacco: Never Used   Substance Use Topics    Alcohol use: No    Drug use: No       Patient lives at home. Physical Exam:     Vitals:    03/04/22 1601   BP: 122/62   Pulse: 102   Temp: 97 °F (36.1 °C)   SpO2: 99%   Weight: 267 lb (121.1 kg)       Exam:  Physical Exam  Nurses note and vital signs reviewed and patient is not hypoxic. General: The patient appears well and in no apparent distress. Patient is resting comfortably on cart. Skin: Warm, dry, no pallor noted. There is no rash noted. Head: Normocephalic, atraumatic. Eye: Normal conjunctiva  Ears, Nose, Mouth, and Throat: Wearing a mask  Cardiovascular: Regular Rate and Rhythm  Respiratory: Patient is in no distress, no accessory muscle use, lungs are clear to auscultation, no wheezing, crackles or rhonchi  Back: Non-tender, no CVA tenderness bilaterally to percussion. GI: Normal bowel sounds, no tenderness to palpation, no masses appreciated.  No rebound, guarding, or rigidity noted. Neurological: A&O x4, normal speech        Testing:     Results for orders placed or performed in visit on 03/04/22   POCT Urinalysis no Micro   Result Value Ref Range    Color, UA yellow     Clarity, UA cloudy     Glucose, UA POC >=1000mg     Bilirubin, UA neg     Ketones, UA neg     Spec Grav, UA 1.025     Blood, UA POC trace intact     pH, UA 5.5     Protein, UA POC 30mg/dl     Urobilinogen, UA 0.2eu     Leukocytes, UA small     Nitrite, UA positive    POCT urine pregnancy   Result Value Ref Range    Preg Test, Ur neg     Control     POCT Glucose   Result Value Ref Range    Glucose 258 mg/dL    QC OK? Organism Klebsiella pneumoniae ssp pneumoniae Abnormal     Urine Culture, Routine >100,000 CFU/ml    Resulting Agency [facility]          Susceptibility     Klebsiella pneumoniae ssp pneumoniae     BACTERIAL SUSCEPTIBILITY PANEL BY TYRONE     ampicillin >=^32 mcg/mL Resistant     ampicillin-sulbactam ^4 mcg/mL Sensitive     ceFAZolin <=^4 mcg/mL Sensitive     cefepime <=^0.12 mcg/mL Sensitive     cefTRIAXone <=^0.25 mcg/mL Sensitive     Confirmatory Extended Spectrum Beta-Lactamase Neg mcg/mL Negative     ertapenem <=^0.12 mcg/mL Sensitive     gentamicin <=^1 mcg/mL Sensitive     levofloxacin <=^0.12 mcg/mL Sensitive     nitrofurantoin ^64 mcg/mL Intermediate     piperacillin-tazobactam <=^4 mcg/mL Sensitive     trimethoprim-sulfamethoxazole <=^20 mcg/mL Sensitive                     Medical Decision Making:     Vital signs reviewed    Past medical history reviewed. Allergies reviewed. Medications reviewed. Patient on arrival does not appear to be in any apparent distress or discomfort. The patient has been seen and evaluated. The patient does not appear to be toxic or lethargic. Previously reviewed as above. The patient's point-of-care glucose was noted to be 250. Pregnancy test was negative. Urinalysis did show evidence of a UTI.   There was evidence of glucose. No ketones present. The patient will monitor her glucose closely. The patient will be treated with Omnicef and Diflucan. Culture pending. The patient is to return to express care or go directly to the emergency department should any of the signs or symptoms worsen. The patient is to followup with primary care physician in 2-3 days for repeat evaluation. The patient has no other questions or concerns at this time the patient will be discharged home. Clinical Impression:   Clover Mora was seen today for urinary tract infection. Diagnoses and all orders for this visit:    Urinary tract infection with hematuria, site unspecified    Urinary frequency  -     POCT Urinalysis no Micro  -     Culture, Urine; Future  -     POCT urine pregnancy  -     POCT Glucose    Dysuria    Other orders  -     cefdinir (OMNICEF) 300 MG capsule; Take 1 capsule by mouth 2 times daily for 10 days  -     fluconazole (DIFLUCAN) 150 MG tablet; Take 1 now and then another at the end of the course of the antibiotic        The patient is to call for any concerns or return if any of the signs or symptoms worsen. The patient is to follow-up with PCP in the next 2-3 days for repeat evaluation repeat assessment or go directly to the emergency department.      SIGNATURE: Filippo Meehan III, PA-C

## 2022-03-07 LAB
ORGANISM: ABNORMAL
URINE CULTURE, ROUTINE: ABNORMAL

## 2022-04-04 DIAGNOSIS — E11.65 TYPE 2 DIABETES MELLITUS WITH HYPERGLYCEMIA, WITHOUT LONG-TERM CURRENT USE OF INSULIN (HCC): ICD-10-CM

## 2022-04-04 DIAGNOSIS — E04.1 THYROID NODULE: ICD-10-CM

## 2022-04-04 DIAGNOSIS — E03.9 HYPOTHYROIDISM, UNSPECIFIED TYPE: ICD-10-CM

## 2022-04-04 RX ORDER — LEVOTHYROXINE SODIUM 0.07 MG/1
TABLET ORAL
Qty: 90 TABLET | Refills: 5 | Status: SHIPPED | OUTPATIENT
Start: 2022-04-04

## 2022-04-04 RX ORDER — METFORMIN HYDROCHLORIDE 500 MG/1
TABLET, EXTENDED RELEASE ORAL
Qty: 360 TABLET | Refills: 5 | Status: SHIPPED | OUTPATIENT
Start: 2022-04-04

## 2022-05-04 ENCOUNTER — HOSPITAL ENCOUNTER (OUTPATIENT)
Age: 47
Discharge: HOME | End: 2022-05-06
Payer: COMMERCIAL

## 2022-05-04 ENCOUNTER — HOSPITAL ENCOUNTER (OUTPATIENT)
Dept: ULTRASOUND IMAGING | Age: 47
Discharge: HOME | End: 2022-05-06
Payer: COMMERCIAL

## 2022-05-04 ENCOUNTER — HOSPITAL ENCOUNTER (OUTPATIENT)
Dept: GENERAL RADIOLOGY | Age: 47
Discharge: HOME | End: 2022-05-06
Payer: COMMERCIAL

## 2022-05-04 DIAGNOSIS — N20.1 CALCULUS OF URETER: ICD-10-CM

## 2022-05-04 DIAGNOSIS — N30.20 OTHER CHRONIC CYSTITIS WITHOUT HEMATURIA: ICD-10-CM

## 2022-05-04 PROCEDURE — 74018 RADEX ABDOMEN 1 VIEW: CPT

## 2022-05-04 PROCEDURE — 76770 US EXAM ABDO BACK WALL COMP: CPT

## 2022-05-04 PROCEDURE — 76775 US EXAM ABDO BACK WALL LIM: CPT

## 2022-07-07 DIAGNOSIS — E11.9 TYPE 2 DIABETES MELLITUS WITHOUT COMPLICATION, WITHOUT LONG-TERM CURRENT USE OF INSULIN (HCC): ICD-10-CM

## 2022-07-07 RX ORDER — GLIPIZIDE 10 MG/1
TABLET, FILM COATED, EXTENDED RELEASE ORAL
Qty: 180 TABLET | Refills: 5 | Status: SHIPPED | OUTPATIENT
Start: 2022-07-07

## 2022-07-12 ENCOUNTER — OFFICE VISIT (OUTPATIENT)
Dept: PRIMARY CARE CLINIC | Age: 47
End: 2022-07-12
Payer: COMMERCIAL

## 2022-07-12 VITALS
WEIGHT: 258 LBS | HEART RATE: 98 BPM | BODY MASS INDEX: 37.02 KG/M2 | TEMPERATURE: 97.8 F | OXYGEN SATURATION: 97 % | DIASTOLIC BLOOD PRESSURE: 82 MMHG | SYSTOLIC BLOOD PRESSURE: 126 MMHG

## 2022-07-12 DIAGNOSIS — F34.1 DYSTHYMIA: ICD-10-CM

## 2022-07-12 DIAGNOSIS — M06.4 INFLAMMATORY POLYARTHROPATHY (HCC): ICD-10-CM

## 2022-07-12 DIAGNOSIS — E89.0 POSTOPERATIVE HYPOTHYROIDISM: ICD-10-CM

## 2022-07-12 DIAGNOSIS — K62.5 RECTAL BLEEDING: Primary | ICD-10-CM

## 2022-07-12 DIAGNOSIS — Z85.850 HISTORY OF THYROID CANCER: ICD-10-CM

## 2022-07-12 DIAGNOSIS — E11.65 TYPE 2 DIABETES MELLITUS WITH HYPERGLYCEMIA, WITH LONG-TERM CURRENT USE OF INSULIN (HCC): ICD-10-CM

## 2022-07-12 DIAGNOSIS — Z00.00 HEALTH MAINTENANCE EXAMINATION: ICD-10-CM

## 2022-07-12 DIAGNOSIS — Z79.4 TYPE 2 DIABETES MELLITUS WITH HYPERGLYCEMIA, WITH LONG-TERM CURRENT USE OF INSULIN (HCC): ICD-10-CM

## 2022-07-12 PROBLEM — F41.9 ANXIETY AND DEPRESSION: Status: RESOLVED | Noted: 2019-07-25 | Resolved: 2022-07-12

## 2022-07-12 PROBLEM — R19.7 DIARRHEA: Status: RESOLVED | Noted: 2020-10-08 | Resolved: 2022-07-12

## 2022-07-12 PROBLEM — F32.A ANXIETY AND DEPRESSION: Status: RESOLVED | Noted: 2019-07-25 | Resolved: 2022-07-12

## 2022-07-12 PROBLEM — D72.829 LEUKOCYTOSIS: Status: RESOLVED | Noted: 2020-10-08 | Resolved: 2022-07-12

## 2022-07-12 PROBLEM — J01.90 ACUTE NON-RECURRENT SINUSITIS: Status: RESOLVED | Noted: 2020-07-07 | Resolved: 2022-07-12

## 2022-07-12 PROBLEM — J40 BRONCHITIS: Status: RESOLVED | Noted: 2020-04-22 | Resolved: 2022-07-12

## 2022-07-12 PROCEDURE — 99214 OFFICE O/P EST MOD 30 MIN: CPT | Performed by: FAMILY MEDICINE

## 2022-07-12 SDOH — ECONOMIC STABILITY: FOOD INSECURITY: WITHIN THE PAST 12 MONTHS, THE FOOD YOU BOUGHT JUST DIDN'T LAST AND YOU DIDN'T HAVE MONEY TO GET MORE.: NEVER TRUE

## 2022-07-12 SDOH — ECONOMIC STABILITY: FOOD INSECURITY: WITHIN THE PAST 12 MONTHS, YOU WORRIED THAT YOUR FOOD WOULD RUN OUT BEFORE YOU GOT MONEY TO BUY MORE.: NEVER TRUE

## 2022-07-12 ASSESSMENT — PATIENT HEALTH QUESTIONNAIRE - PHQ9
9. THOUGHTS THAT YOU WOULD BE BETTER OFF DEAD, OR OF HURTING YOURSELF: 0
1. LITTLE INTEREST OR PLEASURE IN DOING THINGS: 0
10. IF YOU CHECKED OFF ANY PROBLEMS, HOW DIFFICULT HAVE THESE PROBLEMS MADE IT FOR YOU TO DO YOUR WORK, TAKE CARE OF THINGS AT HOME, OR GET ALONG WITH OTHER PEOPLE: 0
8. MOVING OR SPEAKING SO SLOWLY THAT OTHER PEOPLE COULD HAVE NOTICED. OR THE OPPOSITE, BEING SO FIGETY OR RESTLESS THAT YOU HAVE BEEN MOVING AROUND A LOT MORE THAN USUAL: 0
SUM OF ALL RESPONSES TO PHQ QUESTIONS 1-9: 0
5. POOR APPETITE OR OVEREATING: 0
SUM OF ALL RESPONSES TO PHQ QUESTIONS 1-9: 0
SUM OF ALL RESPONSES TO PHQ QUESTIONS 1-9: 0
2. FEELING DOWN, DEPRESSED OR HOPELESS: 0
3. TROUBLE FALLING OR STAYING ASLEEP: 0
4. FEELING TIRED OR HAVING LITTLE ENERGY: 0
6. FEELING BAD ABOUT YOURSELF - OR THAT YOU ARE A FAILURE OR HAVE LET YOURSELF OR YOUR FAMILY DOWN: 0
SUM OF ALL RESPONSES TO PHQ9 QUESTIONS 1 & 2: 0
7. TROUBLE CONCENTRATING ON THINGS, SUCH AS READING THE NEWSPAPER OR WATCHING TELEVISION: 0
SUM OF ALL RESPONSES TO PHQ QUESTIONS 1-9: 0

## 2022-07-12 ASSESSMENT — SOCIAL DETERMINANTS OF HEALTH (SDOH): HOW HARD IS IT FOR YOU TO PAY FOR THE VERY BASICS LIKE FOOD, HOUSING, MEDICAL CARE, AND HEATING?: NOT HARD AT ALL

## 2022-07-12 NOTE — ASSESSMENT & PLAN NOTE
Counseled extensively. Differential reviewed, including serious etiologies. Subjectively resolved, noticed blood for 3 days last week. No other symptoms. Family medical history of Crohn's. Has not had colonoscopy. This was recommended. Fit test also provided. She is seeing the gynecologist soon and defers external/LEONA to them, defers exam today. Notify if recurrence.

## 2022-07-12 NOTE — PROGRESS NOTES
Chantal Haynes : 1975 Sex: female  Age: 40 y.o. Chief Complaint   Patient presents with    Rectal Bleeding     wednesday-saturday        HPI  HPI:    Patient presents today with her  stating that she saw blood in the toilet and red blood in stool last Wednesday, Thursday and Friday while visiting her mom in [place]. There were no preceding symptoms or symptoms during or after. No constipation, rectal pain irritation or evidence of melena. No abdominal pain nausea or vomiting. It has since resolved. She feels well otherwise. Unfortunately have been very noncompliant with her insulin, basically thinks about it and think she will do it in a little bit and then forgets. Has not gotten blood work for Dr Ivania Westfall that appears to been due to in February. Compliance emphasized. She has not been checking her sugars as she should. She has no other complaints. Denies headache chest pain palpitations abdominal pain nausea vomiting numbness tingling focal weakness or otherwise. Significant risk of complications reviewed if noncompliance and conditions uncontrolled and they understand. She does follow with psychiatry and her dysthymia is well managed with medication.     Past labs reviewed    Most Recent Labs  CBC  Lab Results   Component Value Date/Time    WBC 14.4 2022 12:05 AM    WBC 12.2 2021 06:58 PM    WBC 8.6 02/10/2021 10:38 PM    RBC 5.70 2022 12:05 AM    RBC 5.25 2021 06:58 PM    RBC 5.27 02/10/2021 10:38 PM    HGB 13.6 2022 12:05 AM    HGB 12.6 2021 06:58 PM    HGB 12.9 02/10/2021 10:38 PM    HCT 43.2 2022 12:05 AM    HCT 39.9 2021 06:58 PM    HCT 40.4 02/10/2021 10:38 PM    MCV 75.8 2022 12:05 AM    MCV 76.0 2021 06:58 PM    MCV 76.7 02/10/2021 10:38 PM     2022 12:05 AM     2021 06:58 PM     02/10/2021 10:38 PM      CMP  Lab Results   Component Value Date/Time     2022 12:05 AM     05/26/2021 06:58 PM     04/24/2021 12:20 PM    K 4.1 02/08/2022 12:05 AM    K 3.5 05/26/2021 06:58 PM    K 3.9 04/24/2021 12:20 PM    K 3.8 02/10/2021 10:38 PM    K 3.3 10/02/2020 09:50 PM    K 4.2 10/17/2019 05:01 PM     02/08/2022 12:05 AM     05/26/2021 06:58 PM     04/24/2021 12:20 PM    CO2 20 02/08/2022 12:05 AM    CO2 23 05/26/2021 06:58 PM    CO2 23 04/24/2021 12:20 PM    ANIONGAP 15 02/08/2022 12:05 AM    ANIONGAP 12 05/26/2021 06:58 PM    ANIONGAP 9 04/24/2021 12:20 PM    GLUCOSE 258 03/04/2022 04:14 PM    GLUCOSE 303 02/08/2022 12:05 AM    GLUCOSE 141 05/26/2021 06:58 PM    BUN 13 02/08/2022 12:05 AM    BUN 15 05/26/2021 06:58 PM    BUN 12 04/24/2021 12:20 PM    CREATININE 0.6 02/08/2022 12:05 AM    CREATININE 0.6 05/26/2021 06:58 PM    CREATININE 0.6 04/24/2021 12:20 PM    LABGLOM >60 02/08/2022 12:05 AM    LABGLOM >60 05/26/2021 06:58 PM    LABGLOM >60 04/24/2021 12:20 PM    GFRAA >60 02/08/2022 12:05 AM    GFRAA >60 05/26/2021 06:58 PM    GFRAA >60 04/24/2021 12:20 PM    CALCIUM 9.7 02/08/2022 12:05 AM    CALCIUM 9.3 05/26/2021 06:58 PM    CALCIUM 9.0 04/24/2021 12:20 PM    PROT 7.2 05/26/2021 06:58 PM    PROT 7.1 02/10/2021 10:38 PM    PROT 6.9 01/14/2021 11:20 AM    LABALBU 3.7 05/26/2021 06:58 PM    LABALBU 3.8 02/10/2021 10:38 PM    LABALBU 3.6 01/14/2021 11:20 AM    BILITOT 0.6 05/26/2021 06:58 PM    BILITOT 0.5 02/10/2021 10:38 PM    BILITOT 0.7 01/14/2021 11:20 AM    ALKPHOS 85 05/26/2021 06:58 PM    ALKPHOS 105 02/10/2021 10:38 PM    ALKPHOS 97 01/14/2021 11:20 AM    AST 36 05/26/2021 06:58 PM    AST 34 02/10/2021 10:38 PM    AST 70 01/14/2021 11:20 AM    ALT 41 05/26/2021 06:58 PM    ALT 40 02/10/2021 10:38 PM    ALT 68 01/14/2021 11:20 AM     A1C  Lab Results   Component Value Date/Time    LABA1C 8.7 10/11/2021 02:08 PM    LABA1C 8.6 04/24/2021 12:20 PM    LABA1C 10.7 01/14/2021 11:20 AM     TSH  Lab Results   Component Value Date/Time    TSH 1.350 05/26/2021 06:58 PM    TSH 1.250 01/14/2021 11:20 AM    TSH 2.450 05/28/2020 08:30 AM     FREET4  Lab Results   Component Value Date/Time    I4TXTOJ 8.8 05/26/2021 06:58 PM     LIPID  Lab Results   Component Value Date/Time    CHOL 132 04/24/2021 12:20 PM    CHOL 140 01/14/2021 11:20 AM    CHOL 181 07/17/2020 12:50 PM    HDL 60 04/24/2021 12:20 PM    HDL 53 01/14/2021 11:20 AM    HDL 58 07/17/2020 12:50 PM    LDLCALC 54 04/24/2021 12:20 PM    LDLCALC 62 01/14/2021 11:20 AM    LDLCALC 93 07/17/2020 12:50 PM    TRIG 92 04/24/2021 12:20 PM    TRIG 124 01/14/2021 11:20 AM    TRIG 150 07/17/2020 12:50 PM    CHOLHDLRATIO 3.9 02/22/2020 12:00 AM     VITAMIN D  Lab Results   Component Value Date/Time    VITD25 40 04/24/2021 12:20 PM    VITD25 23 01/14/2021 11:20 AM     MAGNESIUM  Lab Results   Component Value Date/Time    MG 1.7 05/26/2021 06:58 PM    MG 1.7 10/02/2020 09:50 PM      PHOS  No results found for: PHOS   DIANELYS   Lab Results   Component Value Date/Time    DIANELYS POSITIVE 07/17/2020 12:50 PM     RHEUMATOID FACTOR  Lab Results   Component Value Date/Time    RF <10 07/17/2020 12:50 PM     PSA  No results found for: PSA   HEPATITIS C  No results found for: HCVABI  HIV  No results found for: BER2UCP, HIV1QT  UA  Lab Results   Component Value Date/Time    COLORU yellow 03/04/2022 04:07 PM    COLORU Yellow 02/07/2022 10:03 PM    COLORU yellow 12/17/2021 01:10 PM    COLORU yellow 08/10/2021 12:14 PM    COLORU Yellow 01/14/2021 11:25 AM    COLORU DARK YELLOW 10/02/2020 09:46 PM    CLARITYU cloudy 03/04/2022 04:07 PM    CLARITYU SL CLOUDY 02/07/2022 10:03 PM    CLARITYU clear 12/17/2021 01:10 PM    CLARITYU cloudy 08/10/2021 12:14 PM    CLARITYU Clear 01/14/2021 11:25 AM    CLARITYU CLOUDY 10/02/2020 09:46 PM    GLUCOSEU >=1000mg 03/04/2022 04:07 PM    GLUCOSEU >=1000 02/07/2022 10:03 PM    GLUCOSEU 500 mg 12/17/2021 01:10 PM    GLUCOSEU 100 08/10/2021 12:14 PM    GLUCOSEU >=1000 01/14/2021 11:25 AM    GLUCOSEU Negative 10/02/2020 09:46 PM    BILIRUBINUR neg 03/04/2022 04:07 PM    BILIRUBINUR Negative 02/07/2022 10:03 PM    BILIRUBINUR neg 12/17/2021 01:10 PM    BILIRUBINUR negative 08/10/2021 12:14 PM    BILIRUBINUR Negative 02/22/2020 12:00 AM    KETUA neg 03/04/2022 04:07 PM    KETUA TRACE 02/07/2022 10:03 PM    KETUA trace 12/17/2021 01:10 PM    KETUA trace 08/10/2021 12:14 PM    KETUA Negative 01/14/2021 11:25 AM    KETUA TRACE 10/02/2020 09:46 PM    SPECGRAV 1.025 03/04/2022 04:07 PM    SPECGRAV 1.020 02/07/2022 10:03 PM    SPECGRAV >=1.030 12/17/2021 01:10 PM    SPECGRAV >=1.030 08/10/2021 12:14 PM    SPECGRAV 1.010 01/14/2021 11:25 AM    SPECGRAV >=1.030 10/02/2020 09:46 PM    BLOODU trace intact 03/04/2022 04:07 PM    BLOODU MODERATE 02/07/2022 10:03 PM    BLOODU neg 12/17/2021 01:10 PM    BLOODU small 08/10/2021 12:14 PM    BLOODU Negative 01/14/2021 11:25 AM    BLOODU Negative 10/02/2020 09:46 PM    PHUR 5.5 03/04/2022 04:07 PM    PHUR 6.0 02/07/2022 10:03 PM    PHUR 5.5 12/17/2021 01:10 PM    PHUR 5.5 08/10/2021 12:14 PM    PHUR 7.5 01/14/2021 11:25 AM    PHUR 6.5 10/02/2020 09:46 PM    PROTEINU 30mg/dl 03/04/2022 04:07 PM    PROTEINU 30 02/07/2022 10:03 PM    PROTEINU neg 12/17/2021 01:10 PM    PROTEINU trace 08/10/2021 12:14 PM    PROTEINU Negative 01/14/2021 11:25 AM    PROTEINU TRACE 10/02/2020 09:46 PM    UROBILINOGEN 0.2 02/07/2022 10:03 PM    UROBILINOGEN 0.2 01/14/2021 11:25 AM    UROBILINOGEN 0.2 10/02/2020 09:46 PM    NITRU Negative 02/07/2022 10:03 PM    NITRU Negative 01/14/2021 11:25 AM    NITRU Negative 10/02/2020 09:46 PM    LEUKOCYTESUR small 03/04/2022 04:07 PM    LEUKOCYTESUR SMALL 02/07/2022 10:03 PM    LEUKOCYTESUR neg 12/17/2021 01:10 PM    LEUKOCYTESUR small 08/10/2021 12:14 PM    LEUKOCYTESUR Negative 01/14/2021 11:25 AM    LEUKOCYTESUR Negative 10/02/2020 09:46 PM     Urine Micro/Albumin Ratio  Lab Results   Component Value Date/Time    MALBCR - 01/14/2021 11:25 AM    MALBCR 118 02/22/2020 12:00 DEVAN    MALBCR 11.6 10/17/2019 01:18 PM       Review of Systems  ROS:  Const: Denies chills, fever, malaise and sweats. Eyes: Denies discharge, pain, redness and visual disturbance. ENMT: Denies earaches, other ear symptoms. Denies nasal or sinus symptoms other than stated  above. Denies mouth and tongue lesions and sore throat. CV: Denies chest discomfort, pain; diaphoresis, dizziness, edema, lightheadedness, orthopnea,  palpitations, syncope and near syncopal episode or any exertional symptoms  Resp: Denies cough, hemoptysis, pleuritic pain, SOB, sputum production and wheezing. GI: Denies abdominal pain, change in bowel habits, hematochezia, melena, nausea and vomiting. Other than subjectively resolved occurrence as above  : Denies urinary symptoms including dysuria , urgency, frequency or hematuria. Musculo: Denies musculoskeletal symptoms. Skin: Denies bruising and rash. Neuro: Denies headache, numbness, stiff neck, tingling and focal weakness slurred speech or facial  droop  Hema/Lymph: Denies bleeding/bruising tendency and enlarged lymph nodes        Current Outpatient Medications:     glipiZIDE (GLUCOTROL XL) 10 MG extended release tablet, TAKE 1 TABLET BY MOUTH TWICE A DAY, Disp: 180 tablet, Rfl: 5    levothyroxine (SYNTHROID) 75 MCG tablet, TAKE 1 TABLET BY MOUTH EVERY DAY, Disp: 90 tablet, Rfl: 5    metFORMIN (GLUCOPHAGE-XR) 500 MG extended release tablet, TAKE 2 TABLETS BY MOUTH TWICE A DAY, Disp: 360 tablet, Rfl: 5    ibuprofen (ADVIL;MOTRIN) 800 MG tablet, Take 1 tablet by mouth every 6 hours as needed for Pain, Disp: 20 tablet, Rfl: 0    Insulin Degludec (TRESIBA FLEXTOUCH) 100 UNIT/ML SOPN, Inject 30 Units into the skin daily, Disp: 10 pen, Rfl: 3    Insulin Aspart, w/Niacinamide, (FIASP FLEXTOUCH) 100 UNIT/ML SOPN, 10 units before meals plus a scale.  A max of 50 units/day, Disp: 15 pen, Rfl: 3    Insulin Pen Needle (BD PEN NEEDLE MICRO U/F) 32G X 6 MM MISC, Uses with insulin daily, Disp: 150 each, Rfl: 5    Blood Glucose Monitoring Suppl (ONETOUCH VERIO REFLECT) w/Device KIT, TEST 4 TIMES A DAY, Disp: 1 kit, Rfl: 5    blood glucose test strips (ONETOUCH VERIO) strip, 1 each by In Vitro route 4 times daily As needed. , Disp: 150 each, Rfl: 11    Lancets (Arty Lennox PLUS [id number]) MISC, To test 4x/day, Disp: 200 each, Rfl: 11    Continuous Blood Gluc Transmit (DEXCOM G6 TRANSMITTER) MISC, To change every 90 days, Disp: 1 each, Rfl: 3    Continuous Blood Gluc  (DEXCOM G6 ) JACQUI, To use with sensors, Disp: 1 Device, Rfl: 0    chlorpheniramine (ALLER-CHLOR) 4 MG tablet, Take 1 tablet by mouth every 6 hours as needed for Allergies, Disp: 20 tablet, Rfl: 0    VORTIoxetine (TRINTELLIX) 10 MG TABS tablet, Take 1 tablet by mouth daily, Disp: 30 tablet, Rfl: 0    cetirizine (ZYRTEC) 10 MG tablet, Take 1 tablet by mouth daily, Disp: 30 tablet, Rfl: 1    rosuvastatin (CRESTOR) 10 MG tablet, Take 1 tablet by mouth daily, Disp: 30 tablet, Rfl: 3    Blood Glucose Monitoring Suppl MISC, Blood glucose meter - use as directed, Disp: 1 each, Rfl: 1    Lancets MISC, Use to test blood sugar 3 times a day, Disp: 100 each, Rfl: 5    aspirin 81 MG EC tablet, Take 81 mg by mouth daily, Disp: , Rfl:     B Complex-C (SUPER B COMPLEX/VITAMIN C PO), Take by mouth daily, Disp: , Rfl:     loratadine (CLARITIN) 10 MG tablet, Take 10 mg by mouth daily, Disp: , Rfl:     omeprazole (PRILOSEC) 20 MG delayed release capsule, Take 1 capsule by mouth daily, Disp: 90 capsule, Rfl: 3    Cholecalciferol (VITAMIN D3) 2000 units CAPS, Take 2,000 Units by mouth daily , Disp: , Rfl:   Allergies   Allergen Reactions    Celecoxib Hives       Past Medical History:   Diagnosis Date    Depression     Diabetes mellitus ([facility] [address]     GERD (gastroesophageal reflux disease)     Joint pain     Thyroid disease      Past Surgical History:   Procedure Laterality Date     SECTION      CHOLECYSTECTOMY      HYSTERECTOMY       No family history on file. Social History     Tobacco Use    Smoking status: Never Smoker    Smokeless tobacco: Never Used   Substance Use Topics    Alcohol use: No    Drug use: No      Social History     Social History Narrative    PMH:    Problem List: Eruption, Infestation by Sarcoptes scabiei sherly hominis, Dysthymic disorder, Peptic reflux    disease, Dysthymia, Hypothyroidism, Adult health examination        Health Maintenance:    Mammogram - (10/1/2018)    Mammogram Screening - (10/1/2018)    Influenza Vaccination - (9/1/2015)        Medical Problems:    vitamin D def, Asthma    Hypothyroidism - partial thyroidectomy (half of left lobe); benign nodule    Anxiety    GERD - EGD 2011    Seasonal Allergies, Type 2 Diabetes        Surgical Hx:    C/S    Partial Hysterectomy - left ovary remains (bleeding issues)    Beniat        FH:    Father:    . (Hx)    Mother:    . (Hx)    Mom - thyroid cancer, melanoma    Dad - depression    cousin - melanoma        SH:    . (Marital)Stay at home;    3 kids (8,7,5 as of 2014). No complications during pregnancy. [place] [employer] ; works at [employer]    Personal Habits: Cigarette Use: Nonsmoker. Alcohol: Denies alcohol use. Vitals:    07/12/22 1446   BP: 126/82   Pulse: 98   Temp: 97.8 °F (36.6 °C)   SpO2: 97%   Weight: 258 lb (117 kg)      Wt Readings from Last 3 Encounters:   07/12/22 258 lb (117 kg)   03/04/22 267 lb (121.1 kg)   02/07/22 260 lb (117.9 kg)        Physical Exam  Exam:  Const: Appears comfortable. No signs of acute distress present. Head/Face: Atraumatic on inspection. Eyes: EOMI in both eyes. No discharge from the eyes. PERRL. Sclerae clear. ENMT: Auditory canals normal. Tympanic membranes: intact and translucent. External nose WNL. Nasal mucosa is clear. Oropharynx: No erythema or exudate. Posterior pharynx is normal.  Neck: Supple. Palpation reveals no adenopathy. No masses appreciated. No JVD. Carotids: no  bruits.   Resp: Respirations are unlabored. Clear to auscultation. No rales, rhonchi or wheezes appreciated  over the lungs bilaterally. CV: Rate is regular. Rhythm is regular. No gallop or rubs. No heart murmur appreciated. Extremities: No clubbing, cyanosis, or edema. No calf inflammation or tenderness. Abdomen: Bowel sounds are normoactive. Abdomen is soft, nontender, and nondistended. No  abdominal masses. No palpable hepatosplenomegaly. Lymph: No palpable or visible regional lymphadenopathy. Musculoskeletal: no acute joint inflammation. Skin: Dry and warm with no rash. Skin normal to inspection and palpation overall. Neuro: Alert and oriented. Affect: appropriate. Upper Extremities: 5/5 bilaterally. Lower Extremities:  5/5 bilaterally. Sensation intact to light touch. Reflexes: DTR's are symmetric and 2+ bilaterally. .  Cranial Nerves: Cranial nerves grossly intact. Rectal-defers.  notes no visible abnormality including hemorrhoid at center. She is due to see the gynecologist in the next couple of days. Fit test provided. Office Labs This Visit :  No results found for this visit on 07/12/22. Assessment and Plan:   Diagnosis Orders   1. Rectal bleeding  CBC with Auto Differential    POCT Fecal Immunochemical Test (FIT)    Ambulatory referral to General Surgery   2. Type 2 diabetes mellitus with hyperglycemia, with long-term current use of insulin (HCC)     3. Dysthymia     4. Health maintenance examination  Urinalysis   5. Postoperative hypothyroidism     6. History of thyroid cancer     7. Inflammatory polyarthropathy ([address]         Type 2 diabetes mellitus with hyperglycemia, without long-term current use of insulin ([address]    although she is taking and tolerating her oral medications, she is not compliant with her insulin. Compliance emphasized. Does not sound compliant with follow-ups with Dr. Trina Almanza and did not get February 2022 blood work. Emphasized importance of getting ASAP. Precautions all meds reviewed. Hyper and hypoglycemic precautions reviewed, micro and macrovascular complications reviewed. Lifestyle modification emphasized. Compliance emphasized. Risk of condition especially uncontrolled reviewed. Watch ambulatory if out of range let us know, ER if dangerous numbers. Importance of daily foot exams and regular eye exams reviewed as well    Inflammatory polyarthropathy ([address]    has failed methotrexate. Did not tolerate, GI intolerance. Blood work has been negative. She previously saw Dr. dick/rheumatology but defers follow-up now, asymptomatic    History of thyroid cancer    status postresection. Imperative she follow Dr Sumi Gutierrez    currently stable and asymptomatic, follows with psychiatry, on Trintellix,R/B reviewed. Adamantly denies SI/HI    Rectal bleeding    Counseled extensively. Differential reviewed, including serious etiologies. Subjectively resolved, noticed blood for 3 days last week. No other symptoms. Family medical history of Crohn's. Has not had colonoscopy. This was recommended. Fit test also provided. She is seeing the gynecologist soon and defers external/LEONA to them, defers exam today. Notify if recurrence. Health maintenance examination    Health maintenance issues discussed at length 9/18. Encouraged yearly physicals.       Hypothyroidism    Continue per Dr Cody Mcmahan, blood work pending, on levothyroxine. Counseled         No flowsheet data found. Plan as above. Counseled extensively and differential diagnoses relevant to above were reviewed, including serious etiologies, risks and complications, especially of left uncontrolled. If relevant, instructions and  alternatives to meds/treatment reviewed, as well as interactions, and  SE's/ADRs reviewed, notify immediately if any, discontinuing new meds if any. Plan made after discussion and shared decision making. Again Counseled extensively.  Differential reviewed, including serious etiologies. She sees GYN soon. Refer to Dr. Ariana Florentino. Fit test provided. Blood work ordered, I added CBC  Urinalysis, to piggyback off Dr Catherine Esquivel labs that are overdue including free T4 TSH microalbumin to creatinine ratio lipid hemoglobin A1c CMP and vitamin D. Compliance emphasized. Role of ER/hospitalization reviewed, she says she will go symptoms recur-not interested now. Importance of early diagnosis/treatment reviewed. Risk of sudden DF event reviewed          As long as symptoms steadily improve/resolve, and medical conditions follow the expected course, FU as below, sooner PRN. Return in about 2 weeks (around 7/26/2022), or if symptoms worsen or fail to improve. Educational materials and/or home exercises printed for patient's review and were included in patient instructions on his/her After Visit Summary and given to patient at the end of visit. After discussion, patient and/or guardian verbalizes understanding, agrees, feels comfortable with and wishes to proceed with above treatment plan. Call for any pending results, FU sooner if abnormal, as needed or if any current symptoms persist/worsen. Advised patient to call with any new medication issues, and read all Rx info from pharmacy to assure aware of all possible risks and side effects of medication before taking. Reviewed age and gender appropriate health screening exams and vaccinations. Advised patient regarding importance of keeping up with recommended health maintenance and to schedule as soon as possible if overdue, as this is important in assessing for undiagnosed pathology, especially cancer, as well as protecting against potentially harmful/life threatening disease. Patient and/or guardian verbalizes understanding and agrees with above counseling, assessment and plan. All questions answered.           Signs and symptoms to watch for discussed, serious signs and symptoms

## 2022-07-12 NOTE — ASSESSMENT & PLAN NOTE
has failed methotrexate. Did not tolerate, GI intolerance. Blood work has been negative.   She previously saw Dr. dick/rheumatology but defers follow-up now, asymptomatic

## 2022-07-12 NOTE — ASSESSMENT & PLAN NOTE
currently stable and asymptomatic, follows with psychiatry, on Trintellix,R/B reviewed.   Adamantly denies SI/HI

## 2022-07-30 ENCOUNTER — HOSPITAL ENCOUNTER (EMERGENCY)
Age: 47
Discharge: HOME | End: 2022-07-31
Attending: EMERGENCY MEDICINE
Payer: COMMERCIAL

## 2022-07-30 DIAGNOSIS — N39.0 UTI (URINARY TRACT INFECTION), UNCOMPLICATED: ICD-10-CM

## 2022-07-30 DIAGNOSIS — R73.9 HYPERGLYCEMIA: Primary | ICD-10-CM

## 2022-07-30 LAB
ALBUMIN SERPL-MCNC: 3.4 G/DL (ref 3.5–5.2)
ALBUMIN SERPL-MCNC: 3.9 G/DL (ref 3.5–5.2)
ALP BLD-CCNC: 85 U/L (ref 35–104)
ALP BLD-CCNC: 92 U/L (ref 35–104)
ALT SERPL-CCNC: 17 U/L (ref 0–32)
ALT SERPL-CCNC: 18 U/L (ref 0–32)
ANION GAP SERPL CALCULATED.3IONS-SCNC: 13 MMOL/L (ref 7–16)
ANION GAP SERPL CALCULATED.3IONS-SCNC: 13 MMOL/L (ref 7–16)
AST SERPL-CCNC: 16 U/L (ref 0–31)
AST SERPL-CCNC: 20 U/L (ref 0–31)
BACTERIA: ABNORMAL /HPF
BASOPHILS ABSOLUTE: 0.04 E9/L (ref 0–0.2)
BASOPHILS RELATIVE PERCENT: 0.4 % (ref 0–2)
BETA-HYDROXYBUTYRATE: 0.18 MMOL/L (ref 0.02–0.27)
BILIRUB SERPL-MCNC: 0.4 MG/DL (ref 0–1.2)
BILIRUB SERPL-MCNC: 0.5 MG/DL (ref 0–1.2)
BILIRUBIN URINE: NEGATIVE
BLOOD, URINE: NEGATIVE
BUN BLDV-MCNC: 10 MG/DL (ref 6–20)
BUN BLDV-MCNC: 11 MG/DL (ref 6–20)
CALCIUM SERPL-MCNC: 8.9 MG/DL (ref 8.6–10.2)
CALCIUM SERPL-MCNC: 9.1 MG/DL (ref 8.6–10.2)
CHLORIDE BLD-SCNC: 105 MMOL/L (ref 98–107)
CHLORIDE BLD-SCNC: 106 MMOL/L (ref 98–107)
CLARITY: ABNORMAL
CO2: 19 MMOL/L (ref 22–29)
CO2: 20 MMOL/L (ref 22–29)
COLOR: YELLOW
CREAT SERPL-MCNC: 0.6 MG/DL (ref 0.5–1)
CREAT SERPL-MCNC: 0.6 MG/DL (ref 0.5–1)
EOSINOPHILS ABSOLUTE: 0.1 E9/L (ref 0.05–0.5)
EOSINOPHILS RELATIVE PERCENT: 1 % (ref 0–6)
GFR AFRICAN AMERICAN: >60
GFR AFRICAN AMERICAN: >60
GFR NON-AFRICAN AMERICAN: >60 ML/MIN/1.73
GFR NON-AFRICAN AMERICAN: >60 ML/MIN/1.73
GLUCOSE BLD-MCNC: 229 MG/DL (ref 74–99)
GLUCOSE BLD-MCNC: 266 MG/DL (ref 74–99)
GLUCOSE URINE: >=1000 MG/DL
HCT VFR BLD CALC: 40.4 % (ref 34–48)
HEMOGLOBIN: 12.9 G/DL (ref 11.5–15.5)
IMMATURE GRANULOCYTES #: 0.05 E9/L
IMMATURE GRANULOCYTES %: 0.5 % (ref 0–5)
KETONES, URINE: ABNORMAL MG/DL
LACTIC ACID: 2 MMOL/L (ref 0.5–2.2)
LEUKOCYTE ESTERASE, URINE: NEGATIVE
LYMPHOCYTES ABSOLUTE: 3.61 E9/L (ref 1.5–4)
LYMPHOCYTES RELATIVE PERCENT: 35.2 % (ref 20–42)
MCH RBC QN AUTO: 23.5 PG (ref 26–35)
MCHC RBC AUTO-ENTMCNC: 31.9 % (ref 32–34.5)
MCV RBC AUTO: 73.5 FL (ref 80–99.9)
MONOCYTES ABSOLUTE: 0.84 E9/L (ref 0.1–0.95)
MONOCYTES RELATIVE PERCENT: 8.2 % (ref 2–12)
NEUTROPHILS ABSOLUTE: 5.62 E9/L (ref 1.8–7.3)
NEUTROPHILS RELATIVE PERCENT: 54.7 % (ref 43–80)
NITRITE, URINE: POSITIVE
PDW BLD-RTO: 17.6 FL (ref 11.5–15)
PH UA: 5.5 (ref 5–9)
PH VENOUS: 7.38 (ref 7.35–7.45)
PLATELET # BLD: 333 E9/L (ref 130–450)
PMV BLD AUTO: 9.3 FL (ref 7–12)
POTASSIUM REFLEX MAGNESIUM: 3.9 MMOL/L (ref 3.5–5)
POTASSIUM SERPL-SCNC: 3.6 MMOL/L (ref 3.5–5)
PROTEIN UA: NEGATIVE MG/DL
RBC # BLD: 5.5 E12/L (ref 3.5–5.5)
RBC UA: ABNORMAL /HPF (ref 0–2)
SODIUM BLD-SCNC: 137 MMOL/L (ref 132–146)
SODIUM BLD-SCNC: 139 MMOL/L (ref 132–146)
SPECIFIC GRAVITY UA: >=1.03 (ref 1–1.03)
TOTAL PROTEIN: 6.4 G/DL (ref 6.4–8.3)
TOTAL PROTEIN: 7.2 G/DL (ref 6.4–8.3)
TROPONIN, HIGH SENSITIVITY: <6 NG/L (ref 0–9)
UROBILINOGEN, URINE: 0.2 E.U./DL
WBC # BLD: 10.3 E9/L (ref 4.5–11.5)
WBC UA: ABNORMAL /HPF (ref 0–5)
YEAST: PRESENT /HPF

## 2022-07-30 PROCEDURE — 99284 EMERGENCY DEPT VISIT MOD MDM: CPT

## 2022-07-30 PROCEDURE — 87077 CULTURE AEROBIC IDENTIFY: CPT

## 2022-07-30 PROCEDURE — 6370000000 HC RX 637 (ALT 250 FOR IP): Performed by: EMERGENCY MEDICINE

## 2022-07-30 PROCEDURE — 87186 SC STD MICRODIL/AGAR DIL: CPT

## 2022-07-30 PROCEDURE — 82800 BLOOD PH: CPT

## 2022-07-30 PROCEDURE — 93005 ELECTROCARDIOGRAM TRACING: CPT | Performed by: EMERGENCY MEDICINE

## 2022-07-30 PROCEDURE — 87088 URINE BACTERIA CULTURE: CPT

## 2022-07-30 PROCEDURE — 82010 KETONE BODYS QUAN: CPT

## 2022-07-30 PROCEDURE — 80053 COMPREHEN METABOLIC PANEL: CPT

## 2022-07-30 PROCEDURE — 84484 ASSAY OF TROPONIN QUANT: CPT

## 2022-07-30 PROCEDURE — 81001 URINALYSIS AUTO W/SCOPE: CPT

## 2022-07-30 PROCEDURE — 83605 ASSAY OF LACTIC ACID: CPT

## 2022-07-30 PROCEDURE — 85025 COMPLETE CBC W/AUTO DIFF WBC: CPT

## 2022-07-30 RX ORDER — ACETAMINOPHEN 325 MG/1
650 TABLET ORAL ONCE
Status: COMPLETED | OUTPATIENT
Start: 2022-07-30 | End: 2022-07-30

## 2022-07-30 RX ORDER — SODIUM CHLORIDE 0.9 % (FLUSH) 0.9 %
10 SYRINGE (ML) INJECTION PRN
Status: DISCONTINUED | OUTPATIENT
Start: 2022-07-30 | End: 2022-07-31 | Stop reason: HOSPADM

## 2022-07-30 RX ADMIN — ACETAMINOPHEN 650 MG: 325 TABLET ORAL at 22:45

## 2022-07-30 ASSESSMENT — PAIN DESCRIPTION - DESCRIPTORS
DESCRIPTORS: ACHING
DESCRIPTORS: ACHING

## 2022-07-30 ASSESSMENT — PAIN DESCRIPTION - LOCATION
LOCATION: HEAD
LOCATION: HEAD

## 2022-07-30 ASSESSMENT — PAIN SCALES - GENERAL
PAINLEVEL_OUTOF10: 5
PAINLEVEL_OUTOF10: 7

## 2022-07-30 NOTE — ED PROVIDER NOTES
HPI:  22, Time: 7:59 PM EDT         Gallito Lisa is a 40 y.o. female presenting to the ED for hyperglycemia, beginning several days ago. The complaint has been persistent, moderate in severity, and worsened by nothing. Patient reports that she was \"feeling weird \"over the last several days. Patient reporting elevated blood sugar over 400. Patient does take insulin and pills. Patient reporting diarrhea on Thursday. Patient reporting no productive cough fever chills or chest pain she reports no abdominal pain she reports no headache she reports no weakness or dizziness. Patient reporting no pain with urination but does report urinary frequency    ROS:   Pertinent positives and negatives are stated within HPI, all other systems reviewed and are negative.  --------------------------------------------- PAST HISTORY ---------------------------------------------  Past Medical History:  has a past medical history of Depression, Diabetes mellitus ([address] GERD (gastroesophageal reflux disease), Joint pain, and Thyroid disease. Past Surgical History:  has a past surgical history that includes Hysterectomy; Cholecystectomy; and  section. Social History:  reports that she has never smoked. She has never used smokeless tobacco. She reports that she does not drink alcohol and does not use drugs. Family History: family history is not on file. The patients home medications have been reviewed.     Allergies: Celecoxib    ---------------------------------------------------PHYSICAL EXAM--------------------------------------    Constitutional/General: Alert and oriented x3, well appearing, non toxic in NAD  Head: Normocephalic and atraumatic  Eyes: PERRL, EOMI  Mouth: Oropharynx clear, handling secretions, no trismus  Neck: Supple, full ROM, non tender to palpation in the midline, no stridor, no crepitus, no meningeal signs  Pulmonary: Lungs clear to auscultation bilaterally, no wheezes, rales, or rhonchi. Not in respiratory distress  Cardiovascular:  Regular rate. Regular rhythm. No murmurs, gallops, or rubs. 2+ distal pulses  Chest: no chest wall tenderness  Abdomen: Soft. Non tender. Non distended. +BS. No rebound, guarding, or rigidity. No pulsatile masses appreciated. Musculoskeletal: Moves all extremities x 4. Warm and well perfused, no clubbing, cyanosis, or edema. Capillary refill <3 seconds  Skin: warm and dry. No rashes. Neurologic: GCS 15, CN 2-12 grossly intact, no focal deficits, symmetric strength 5/5 in the upper and lower extremities bilaterally  Psych: Normal Affect    -------------------------------------------------- RESULTS -------------------------------------------------  I have personally reviewed all laboratory and imaging results for this patient. Results are listed below.      LABS:  Results for orders placed or performed during the hospital encounter of 07/30/22   CBC with Auto Differential   Result Value Ref Range    WBC 10.3 4.5 - 11.5 E9/L    RBC 5.50 3.50 - 5.50 E12/L    Hemoglobin 12.9 11.5 - 15.5 g/dL    Hematocrit 40.4 34.0 - 48.0 %    MCV 73.5 (L) 80.0 - 99.9 fL    MCH 23.5 (L) 26.0 - 35.0 pg    MCHC 31.9 (L) 32.0 - 34.5 %    RDW 17.6 (H) 11.5 - 15.0 fL    Platelets 399 497 - 386 E9/L    MPV 9.3 7.0 - 12.0 fL    Neutrophils % 54.7 43.0 - 80.0 %    Immature Granulocytes % 0.5 0.0 - 5.0 %    Lymphocytes % 35.2 20.0 - 42.0 %    Monocytes % 8.2 2.0 - 12.0 %    Eosinophils % 1.0 0.0 - 6.0 %    Basophils % 0.4 0.0 - 2.0 %    Neutrophils Absolute 5.62 1.80 - 7.30 E9/L    Immature Granulocytes # 0.05 E9/L    Lymphocytes Absolute 3.61 1.50 - 4.00 E9/L    Monocytes Absolute 0.84 0.10 - 0.95 E9/L    Eosinophils Absolute 0.10 0.05 - 0.50 E9/L    Basophils Absolute 0.04 0.00 - 0.20 E9/L   Comprehensive Metabolic Panel   Result Value Ref Range    Sodium 139 132 - 146 mmol/L    Potassium 3.6 3.5 - 5.0 mmol/L    Chloride 106 98 - 107 mmol/L    CO2 20 (L) 22 - 29 mmol/L    Anion Gap 13 7 - 16 mmol/L    Glucose 266 (H) 74 - 99 mg/dL    BUN 10 6 - 20 mg/dL    Creatinine 0.6 0.5 - 1.0 mg/dL    GFR Non-African American >60 >=60 mL/min/1.73    GFR African American >60     Calcium 9.1 8.6 - 10.2 mg/dL    Total Protein 7.2 6.4 - 8.3 g/dL    Albumin 3.9 3.5 - 5.2 g/dL    Total Bilirubin 0.5 0.0 - 1.2 mg/dL    Alkaline Phosphatase 92 35 - 104 U/L    ALT 17 0 - 32 U/L    AST 16 0 - 31 U/L   Lactic Acid   Result Value Ref Range    Lactic Acid 2.0 0.5 - 2.2 mmol/L   Urinalysis   Result Value Ref Range    Color, UA Yellow Straw/Yellow    Clarity, UA SL CLOUDY Clear    Glucose, Ur >=1000 (A) Negative mg/dL    Bilirubin Urine Negative Negative    Ketones, Urine TRACE (A) Negative mg/dL    Specific Gravity, UA >=1.030 1.005 - 1.030    Blood, Urine Negative Negative    pH, UA 5.5 5.0 - 9.0    Protein, UA Negative Negative mg/dL    Urobilinogen, Urine 0.2 <2.0 E.U./dL    Nitrite, Urine POSITIVE (A) Negative    Leukocyte Esterase, Urine Negative Negative   pH, venous   Result Value Ref Range    pH, Daniel 7.38 7.35 - 7.45   Beta-Hydroxybutyrate   Result Value Ref Range    Beta-Hydroxybutyrate 0.18 0.02 - 0.27 mmol/L   Microscopic Urinalysis   Result Value Ref Range    WBC, UA 5-10 (A) 0 - 5 /HPF    RBC, UA 1-3 0 - 2 /HPF    Bacteria, UA MANY (A) None Seen /HPF    Yeast, UA Present (A) None Seen /HPF   Troponin   Result Value Ref Range    Troponin, High Sensitivity <6 0 - 9 ng/L   Comprehensive Metabolic Panel w/ Reflex to MG   Result Value Ref Range    Sodium 137 132 - 146 mmol/L    Potassium reflex Magnesium 3.9 3.5 - 5.0 mmol/L    Chloride 105 98 - 107 mmol/L    CO2 19 (L) 22 - 29 mmol/L    Anion Gap 13 7 - 16 mmol/L    Glucose 229 (H) 74 - 99 mg/dL    BUN 11 6 - 20 mg/dL    Creatinine 0.6 0.5 - 1.0 mg/dL    GFR Non-African American >60 >=60 mL/min/1.73    GFR African American >60     Calcium 8.9 8.6 - 10.2 mg/dL    Total Protein 6.4 6.4 - 8.3 g/dL    Albumin 3.4 (L) 3.5 - 5.2 g/dL    Total Bilirubin 0.4 0.0 - 1.2 mg/dL    Alkaline Phosphatase 85 35 - 104 U/L    ALT 18 0 - 32 U/L    AST 20 0 - 31 U/L       RADIOLOGY:  Interpreted by Radiologist.  No orders to display       EKG: This EKG is signed and interpreted by me. Rate: 92  Rhythm: Sinus  Interpretation: no acute changes  Comparison: stable as compared to patient's most recent EKG    ------------------------- NURSING NOTES AND VITALS REVIEWED ---------------------------   The nursing notes within the ED encounter and vital signs as below have been reviewed by myself. /76   Pulse 92   Temp 98.2 °F (36.8 °C)   Resp 25   Ht 5' 10\" (1.778 m)   Wt 260 lb (117.9 kg)   SpO2 97%   BMI 37.31 kg/m²   Oxygen Saturation Interpretation: Normal    The patients available past medical records and past encounters were reviewed. ------------------------------ ED COURSE/MEDICAL DECISION MAKING----------------------  Medications   sodium chloride flush 0.9 % injection 10 mL (has no administration in time range)   cephALEXin (KEFLEX) capsule 250 mg (has no administration in time range)   acetaminophen (TYLENOL) tablet 650 mg (650 mg Oral Given 7/30/22 2245)             Medical Decision Making:    Patient presenting here because of elevated blood sugars. Patient reporting feeling \"weird \". Patient reporting no chest pain no difficulty breathing she reports no upper or lower extremity weakness she reports no headache on initial exam.  Patient is neurologically intact patient heart and lung exam normal abdomen soft and nontender. Labs noted reviewed. Patient is not in DKA. Patient was given IV fluids patient significantly improved. Patient's abdomen is soft and nontender. Patient has no rebound no guarding she has no flank tenderness patient will be placed on Keflex for UTI. Patient in no distress here nontoxic-appearing patient will be discharged home she is to follow-up with her PCP.     Re-Evaluations:             Patient reeval multiple times here in the emergency department. Patient reporting no chest pain or difficulty breathing patient neurologically intact labs noted reviewed. Patient given IV fluids. Patient made aware of findings and plan. Patient is comfortable being discharged home. Patient to return anytime for any further problems      Consultations:                 Critical Care: This patient's ED course included: a personal history and physicial eaxmination    This patient has been closely monitored during their ED course. Counseling: The emergency provider has spoken with the patient and discussed todays results, in addition to providing specific details for the plan of care and counseling regarding the diagnosis and prognosis. Questions are answered at this time and they are agreeable with the plan.       --------------------------------- IMPRESSION AND DISPOSITION ---------------------------------    IMPRESSION  1. Hyperglycemia    2. UTI (urinary tract infection), uncomplicated        DISPOSITION  Disposition: Discharge home  Patient condition is stable        NOTE: This report was transcribed using voice recognition software.  Every effort was made to ensure accuracy; however, inadvertent computerized transcription errors may be present          Js Tinoco MD  07/31/22 3608

## 2022-07-31 VITALS
WEIGHT: 260 LBS | RESPIRATION RATE: 25 BRPM | BODY MASS INDEX: 37.22 KG/M2 | HEART RATE: 91 BPM | DIASTOLIC BLOOD PRESSURE: 78 MMHG | HEIGHT: 70 IN | OXYGEN SATURATION: 95 % | SYSTOLIC BLOOD PRESSURE: 147 MMHG | TEMPERATURE: 98.6 F

## 2022-07-31 PROCEDURE — 6370000000 HC RX 637 (ALT 250 FOR IP): Performed by: EMERGENCY MEDICINE

## 2022-07-31 RX ORDER — CEPHALEXIN 250 MG/1
250 CAPSULE ORAL 4 TIMES DAILY
Qty: 21 CAPSULE | Refills: 0 | Status: SHIPPED | OUTPATIENT
Start: 2022-07-31 | End: 2022-08-07

## 2022-07-31 RX ORDER — CEPHALEXIN 250 MG/1
250 CAPSULE ORAL ONCE
Status: COMPLETED | OUTPATIENT
Start: 2022-07-31 | End: 2022-07-31

## 2022-07-31 RX ADMIN — CEPHALEXIN 250 MG: 250 CAPSULE ORAL at 00:12

## 2022-08-01 LAB
EKG ATRIAL RATE: 92 BPM
EKG P AXIS: 30 DEGREES
EKG P-R INTERVAL: 170 MS
EKG Q-T INTERVAL: 372 MS
EKG QRS DURATION: 98 MS
EKG QTC CALCULATION (BAZETT): 460 MS
EKG R AXIS: 20 DEGREES
EKG T AXIS: 40 DEGREES
EKG VENTRICULAR RATE: 92 BPM
ORGANISM: ABNORMAL
URINE CULTURE, ROUTINE: ABNORMAL
URINE CULTURE, ROUTINE: ABNORMAL

## 2022-08-02 ENCOUNTER — OFFICE VISIT (OUTPATIENT)
Dept: SURGERY | Age: 47
End: 2022-08-02
Payer: COMMERCIAL

## 2022-08-02 VITALS
BODY MASS INDEX: 37.22 KG/M2 | HEIGHT: 70 IN | HEART RATE: 95 BPM | OXYGEN SATURATION: 98 % | WEIGHT: 260 LBS | TEMPERATURE: 97.3 F | RESPIRATION RATE: 18 BRPM | DIASTOLIC BLOOD PRESSURE: 77 MMHG | SYSTOLIC BLOOD PRESSURE: 127 MMHG

## 2022-08-02 DIAGNOSIS — K62.5 RECTAL BLEEDING: Primary | ICD-10-CM

## 2022-08-02 PROCEDURE — 99203 OFFICE O/P NEW LOW 30 MIN: CPT | Performed by: SURGERY

## 2022-08-02 NOTE — PROGRESS NOTES
[facility] Surgery Clinic Note    Assessment/Plan:      Diagnosis Orders   1. Rectal bleeding      Likely benign, hemorrhoidal related. She is due for colonoscopy we will plan for that            Return for Colonoscopy. Chief Complaint   Patient presents with    New Patient     Rectal bleeding        PCP: Spencer Cavazos MD    HPI: Carlos Enrique Carlos is a 40 y.o. female who presents in consultation for rectal bleeding. She has not had colonoscopy previously. This was about a month ago. She has not had any issues recently with it. She denies any significant diarrhea or constipation. She has no rectal pain. She has no abdominal pain. She does have a family history of Crohn's in her sister. There is no family history of colon cancer. Past Medical History:   Diagnosis Date    Depression     Diabetes mellitus (HCC)     GERD (gastroesophageal reflux disease)     Joint pain     Thyroid disease        Past Surgical History:   Procedure Laterality Date     SECTION      CHOLECYSTECTOMY      HYSTERECTOMY (CERVIX STATUS UNKNOWN)         Prior to Admission medications    Medication Sig Start Date End Date Taking?  Authorizing Provider   TRINTELLIX 10 MG TABS tablet TAKE 1 TABLET BY MOUTH EVERY DAY 22  Yes Historical Provider, MD   metFORMIN (GLUCOPHAGE-XR) 500 MG extended release tablet TAKE 2 TABLETS BY MOUTH TWICE A DAY 22  Yes Historical Provider, MD   levothyroxine (SYNTHROID) 75 MCG tablet TAKE 1 TABLET BY MOUTH EVERY DAY 22  Yes Historical Provider, MD   BD PEN NEEDLE MICRO U/F 32G X 6 MM MISC USES WITH INSULIN DAILY 22  Yes Historical Provider, MD Karena Chan strip TEST 4 TIMES A DAY AS NEEDED 22  Yes Historical Provider, MD   glipiZIDE (GLUCOTROL XL) 10 MG extended release tablet TAKE 1 TABLET BY MOUTH TWICE A DAY 22  Yes Historical Provider, MD   glipiZIDE (GLUCOTROL XL) 10 MG extended release tablet TAKE 1 TABLET BY MOUTH TWICE A DAY 22  Yes Melissa Salts MD Vinh   levothyroxine (SYNTHROID) 75 MCG tablet TAKE 1 TABLET BY MOUTH EVERY DAY 4/4/22  Yes Yary Blackburn MD   metFORMIN (GLUCOPHAGE-XR) 500 MG extended release tablet TAKE 2 TABLETS BY MOUTH TWICE A DAY 4/4/22  Yes Yary Blackburn MD   Insulin Degludec (TRESIBA FLEXTOUCH) 100 UNIT/ML SOPN Inject 30 Units into the skin daily 10/11/21  Yes Yary Blackburn MD   Insulin Aspart, w/Niacinamide, (FIASP FLEXTOUCH) 100 UNIT/ML SOPN 10 units before meals plus a scale. A max of 50 units/day 10/11/21  Yes Yary Blackburn MD   Insulin Pen Needle (BD PEN NEEDLE MICRO U/F) 32G X 6 MM MISC Uses with insulin daily 10/11/21  Yes Yary Blackburn MD   Blood Glucose Monitoring Suppl (ONETOUCH VERIO REFLECT) w/Device KIT TEST 4 TIMES A DAY 8/24/21  Yes Yary Blackburn MD   blood glucose test strips (ONETOUCH VERIO) strip 1 each by In Vitro route 4 times daily As needed.  7/22/21  Yes Yary Blackburn MD   Lancets ([address]) [address] To test 4x/day 7/22/21  Yes Chan Summers MD   Continuous Blood Gluc Transmit (DEXCOM G6 TRANSMITTER) MISC To change every 90 days 7/12/21  Yes Yary Blackburn MD   Continuous Blood Gluc  (Clarene Lev) JACQUI To use with sensors 7/12/21  Yes Yary Blackburn MD   chlorpheniramine (ALLER-CHLOR) 4 MG tablet Take 1 tablet by mouth every 6 hours as needed for Allergies 5/4/21  Yes BLU Vegas III   VORTIoxetine (TRINTELLIX) 10 MG TABS tablet Take 1 tablet by mouth daily 2/26/21  Yes Roberta Mcknight MD   cetirizine (ZYRTEC) 10 MG tablet Take 1 tablet by mouth daily 11/19/20  Yes Ashtyn Mccord DO   rosuvastatin (CRESTOR) 10 MG tablet Take 1 tablet by mouth daily 7/7/20  Yes Roberta Mcknight MD   Blood Glucose Monitoring Suppl MISC Blood glucose meter - use as directed 6/3/20  Yes Yary Blackburn MD   Lancets MISC Use to test blood sugar 3 times a day 6/3/20  Yes Yary Blackburn MD   aspirin 81 MG EC tablet Take 81 mg by mouth daily   Yes Historical Provider, MD   B Complex-C (SUPER B COMPLEX/VITAMIN C PO) Take by mouth daily   Yes Historical Provider, MD   loratadine (CLARITIN) 10 MG tablet Take 10 mg by mouth daily   Yes Historical Provider, MD   omeprazole (PRILOSEC) 20 MG delayed release capsule Take 1 capsule by mouth daily 4/22/20  Yes Larissa Bolivar MD   Cholecalciferol (VITAMIN D3) 2000 units CAPS Take 2,000 Units by mouth daily    Yes Historical Provider, MD   ibuprofen (ADVIL;MOTRIN) 800 MG tablet Take 1 tablet by mouth every 6 hours as needed for Pain 2/8/22 7/12/22  Lottie Felisa PA-C       Allergies   Allergen Reactions    Celecoxib Hives       Social History     Socioeconomic History    Marital status: Unknown     Spouse name: None    Number of children: None    Years of education: None    Highest education level: None   Tobacco Use    Smoking status: Never    Smokeless tobacco: Never   Substance and Sexual Activity    Alcohol use: Never    Drug use: Never   Social History Narrative    ** Merged History Encounter **         PMH:  Problem List: Eruption, Infestation by Sarcoptes scabiei sherly hominis, Dysthymic disorder, Peptic reflux  disease, Dysthymia, Hypothyroidism, Adult health examination    Health Maintenance:  Mammogram - (10/1/2018)  Mammogram Screening - (10/1/2018)      Influenza Vaccination - (9/1/2015)    Medical Problems:  vitamin D def, Asthma  Hypothyroidism - partial thyroidectomy (half of left lobe); benign nodule  Anxiety  GERD - EGD 2011  Seasonal Allergies, Type 2 Diabetes    Surgical Hx:  C/S  Partial Hyste    rectomy - left ovary remains (bleeding issues)  Benita    FH:  Father:  . (Hx)  Mother:  . (Hx)  Mom - thyroid cancer, melanoma  Dad - depression  cousin - melanoma    SH:  . (Marital)Stay at home;  3 kids (8,7,5 as of 2014). No complications during pregn    yadira. Paty Rose ; works at [employer]  Personal Habits: Cigarette Use: Nonsmoker. Alcohol: Denies alcohol use.      Social Determinants of Health     Financial Resource Strain: Low Risk     Difficulty of Paying Living Expenses: Not hard at all   Food Insecurity: No Food Insecurity    Worried About Running Out of Food in the Last Year: Never true    Ran Out of Food in the Last Year: Never true       No family history on file. Review of Systems   All other systems reviewed and are negative. Objective:  Vitals:    08/02/22 1508   BP: 127/77   Pulse: 95   Resp: 18   Temp: 97.3 °F (36.3 °C)   TempSrc: Temporal   SpO2: 98%   Weight: 260 lb (117.9 kg)   Height: 5' 10\" (1.778 m)          Physical Exam  HENT:      Head: Normocephalic and atraumatic. Eyes:      General:         Right eye: No discharge. Left eye: No discharge. Neck:      Trachea: No tracheal deviation. Cardiovascular:      Rate and Rhythm: Normal rate. Pulmonary:      Effort: Pulmonary effort is normal. No respiratory distress. Abdominal:      General: There is no distension. Palpations: Abdomen is soft. Tenderness: There is no abdominal tenderness. There is no guarding or rebound. Genitourinary:     Comments: Hemorrhoid  Skin:     General: Skin is warm and dry. Neurological:      Mental Status: She is alert and oriented to person, place, and time. Rda Vang MD      NOTE: This report, in part or full,may have been transcribed using voice recognition software. Every effort was made to ensure accuracy; however, inadvertent computerized transcription errors may be present. Please excuse any transcriptional grammatical or spelling errors that may have escaped my editorial review.     CC: Mary Castanon MD

## 2022-08-04 LAB — MAMMOGRAPHY, EXTERNAL: NORMAL

## 2022-08-09 ENCOUNTER — HOSPITAL ENCOUNTER (OUTPATIENT)
Age: 47
Discharge: HOME | End: 2022-08-09
Payer: COMMERCIAL

## 2022-08-09 DIAGNOSIS — K62.5 RECTAL BLEEDING: ICD-10-CM

## 2022-08-09 DIAGNOSIS — Z00.00 HEALTH MAINTENANCE EXAMINATION: ICD-10-CM

## 2022-08-09 DIAGNOSIS — E03.9 HYPOTHYROIDISM, UNSPECIFIED TYPE: ICD-10-CM

## 2022-08-09 DIAGNOSIS — E11.9 TYPE 2 DIABETES MELLITUS WITHOUT COMPLICATION, WITHOUT LONG-TERM CURRENT USE OF INSULIN (HCC): ICD-10-CM

## 2022-08-09 LAB
ALBUMIN SERPL-MCNC: 3.9 G/DL (ref 3.5–5.2)
ALP BLD-CCNC: 91 U/L (ref 35–104)
ALT SERPL-CCNC: 25 U/L (ref 0–32)
ANION GAP SERPL CALCULATED.3IONS-SCNC: 12 MMOL/L (ref 7–16)
AST SERPL-CCNC: 22 U/L (ref 0–31)
BASOPHILS ABSOLUTE: 0.05 E9/L (ref 0–0.2)
BASOPHILS RELATIVE PERCENT: 0.8 % (ref 0–2)
BILIRUB SERPL-MCNC: 0.7 MG/DL (ref 0–1.2)
BUN BLDV-MCNC: 13 MG/DL (ref 6–20)
CALCIUM SERPL-MCNC: 9.2 MG/DL (ref 8.6–10.2)
CHLORIDE BLD-SCNC: 103 MMOL/L (ref 98–107)
CHOLESTEROL, TOTAL: 209 MG/DL (ref 0–199)
CO2: 23 MMOL/L (ref 22–29)
CREAT SERPL-MCNC: 0.6 MG/DL (ref 0.5–1)
EOSINOPHILS ABSOLUTE: 0.07 E9/L (ref 0.05–0.5)
EOSINOPHILS RELATIVE PERCENT: 1.1 % (ref 0–6)
GFR AFRICAN AMERICAN: >60
GFR NON-AFRICAN AMERICAN: >60 ML/MIN/1.73
GLUCOSE BLD-MCNC: 265 MG/DL (ref 74–99)
HBA1C MFR BLD: 10.4 % (ref 4–5.6)
HCT VFR BLD CALC: 40.9 % (ref 34–48)
HDLC SERPL-MCNC: 52 MG/DL
HEMOGLOBIN: 12.7 G/DL (ref 11.5–15.5)
IMMATURE GRANULOCYTES #: 0.02 E9/L
IMMATURE GRANULOCYTES %: 0.3 % (ref 0–5)
LDL CHOLESTEROL CALCULATED: 117 MG/DL (ref 0–99)
LYMPHOCYTES ABSOLUTE: 2.36 E9/L (ref 1.5–4)
LYMPHOCYTES RELATIVE PERCENT: 36.8 % (ref 20–42)
MCH RBC QN AUTO: 23.9 PG (ref 26–35)
MCHC RBC AUTO-ENTMCNC: 31.1 % (ref 32–34.5)
MCV RBC AUTO: 77 FL (ref 80–99.9)
MONOCYTES ABSOLUTE: 0.54 E9/L (ref 0.1–0.95)
MONOCYTES RELATIVE PERCENT: 8.4 % (ref 2–12)
NEUTROPHILS ABSOLUTE: 3.38 E9/L (ref 1.8–7.3)
NEUTROPHILS RELATIVE PERCENT: 52.6 % (ref 43–80)
PDW BLD-RTO: 17.2 FL (ref 11.5–15)
PLATELET # BLD: 309 E9/L (ref 130–450)
PMV BLD AUTO: 9.2 FL (ref 7–12)
POTASSIUM SERPL-SCNC: 4 MMOL/L (ref 3.5–5)
RBC # BLD: 5.31 E12/L (ref 3.5–5.5)
SODIUM BLD-SCNC: 138 MMOL/L (ref 132–146)
T4 FREE: 1.23 NG/DL (ref 0.93–1.7)
TOTAL PROTEIN: 7 G/DL (ref 6.4–8.3)
TRIGL SERPL-MCNC: 199 MG/DL (ref 0–149)
TSH SERPL DL<=0.05 MIU/L-ACNC: 1.48 UIU/ML (ref 0.27–4.2)
VITAMIN D 25-HYDROXY: 23 NG/ML (ref 30–100)
VLDLC SERPL CALC-MCNC: 40 MG/DL
WBC # BLD: 6.4 E9/L (ref 4.5–11.5)

## 2022-08-09 PROCEDURE — 36415 COLL VENOUS BLD VENIPUNCTURE: CPT

## 2022-08-09 PROCEDURE — 82306 VITAMIN D 25 HYDROXY: CPT

## 2022-08-09 PROCEDURE — 85025 COMPLETE CBC W/AUTO DIFF WBC: CPT

## 2022-08-09 PROCEDURE — 83036 HEMOGLOBIN GLYCOSYLATED A1C: CPT

## 2022-08-09 PROCEDURE — 80061 LIPID PANEL: CPT

## 2022-08-09 PROCEDURE — 80053 COMPREHEN METABOLIC PANEL: CPT

## 2022-08-09 PROCEDURE — 84439 ASSAY OF FREE THYROXINE: CPT

## 2022-08-09 PROCEDURE — 84443 ASSAY THYROID STIM HORMONE: CPT

## 2022-08-10 ENCOUNTER — TELEPHONE (OUTPATIENT)
Dept: SURGERY | Age: 47
End: 2022-08-10

## 2022-08-10 NOTE — TELEPHONE ENCOUNTER
Prior Authorization Form:      DEMOGRAPHICS:                     Patient Name:  Carol Greenwood  Patient :  1975            Insurance:  Payor: [address] [place] [address] Street / Plan: [address] [place] [address] [place] / Product Type: *No Product type* /   Insurance ID Number:    Payer/Plan Subscr  Sex Relation Sub. Ins. ID Effective Group Num   1.  [address] [facility] 3/4/1978 Male Spouse [id number] [address] [facility]                                    [id number]         DIAGNOSIS & PROCEDURE:                       Procedure/Operation: Colonoscopy           CPT Code: 54848    Diagnosis:  Rectal bleeding    ICD10 Code: K62.5    Location:  [facility]    Surgeon:  Dr Asad Barton INFORMATION:                          Date: 10-27-22    Time: 8:30 am              Anesthesia:  [facility]                                                       Status:  Outpatient        Special Comments:         Electronically signed by Pravin Laboy MA on 8/10/2022 at 9:40 AM

## 2022-08-10 NOTE — TELEPHONE ENCOUNTER
Leonidas Johnson is scheduled for colonoscopy with Dr Otilia Kohler on 10-27-22 at [facility] at 8:30 am. Patient was told to arrive at 7:30 AM. Patient needs to be NPO after midnight the night before procedure. All surgery instructions were explained to the patient and a surgery letter was also mailed out. MA informed patient that PAT will also be calling to review pre-op instructions and medications. Patient verbalized understanding.   Electronically signed by Hanny Sinclair MA on 8/10/2022 at 9:38 AM

## 2022-08-11 PROBLEM — Z00.00 HEALTH MAINTENANCE EXAMINATION: Status: RESOLVED | Noted: 2019-07-25 | Resolved: 2022-08-11

## 2022-10-05 DIAGNOSIS — E11.9 TYPE 2 DIABETES MELLITUS WITHOUT COMPLICATION, WITHOUT LONG-TERM CURRENT USE OF INSULIN (HCC): ICD-10-CM

## 2022-10-13 RX ORDER — BLOOD SUGAR DIAGNOSTIC
STRIP MISCELLANEOUS
Qty: 300 STRIP | Refills: 5 | Status: SHIPPED | OUTPATIENT
Start: 2022-10-13

## 2022-10-25 NOTE — PROGRESS NOTES
[facility] PRE-ADMISSION TESTING INSTRUCTIONS    The Preadmission Testing patient is instructed accordingly using the following criteria (check applicable):    ARRIVAL INSTRUCTIONS:  [x] Parking the day of Surgery is located in the Main Entrance lot. Upon entering the door, make an immediate right to the surgery reception desk    [x] Bring photo ID and insurance card    [x] Bring in a copy of Living will or Durable Power of  papers. [x] Please be sure to arrange for responsible adult to provide transportation to and from the hospital    [x] Please arrange for responsible adult to be with you for the 24 hour period post procedure due to having anesthesia    [x] If you awake am of surgery not feeling well or have temperature >100 please call [phone]    GENERAL INSTRUCTIONS:    [x] Nothing by mouth after midnight, including gum, candy, mints or water    [x] You may brush your teeth, but do not swallow any water    [x] Take medications as instructed with 1-2 oz of water    [x] Stop herbal supplements and vitamins 5 days prior to procedure    [x] Follow preop dosing of blood thinners per physician instructions    [x] Take 1/2 dose of evening insulin, but no insulin after midnight    [x] No oral diabetic medications after midnight    [x] If diabetic and have low blood sugar or feel symptomatic, take 1-2oz apple juice only    [] Bring inhalers day of surgery    [] Bring C-PAP/ Bi-Pap day of surgery    [] Bring urine specimen day of surgery    [x] Shower or bath with soap, lather and rinse well, AM of Surgery, no lotion, powders or creams     [x] Follow bowel prep as instructed per surgeon    [x] No tobacco products within 24 hours of surgery     [x] No alcohol or illegal drug use within 24 hours of surgery.     [x] Jewelry, body piercing's, eyeglasses, contact lenses and dentures are not permitted into surgery (bring cases)      [x] Please do not wear any nail polish, make up or hair products on the day of surgery    [x] You can expect a call the business day prior to procedure to notify you if your arrival time changes    [x] If you receive a survey after surgery we would greatly appreciate your comments    [] Parent/guardian of a minor must accompany their child and remain on the premises  the entire time they are under our care     [] Pediatric patients may bring favorite toy, blanket or comfort item with them    [] A caregiver or family member must remain with the patient during their stay if they are mentally handicapped, have dementia, disoriented or unable to use a call light or would be a safety concern if left unattended    [x] Please notify surgeon if you develop any illness between now and time of surgery (cold, cough, sore throat, fever, nausea, vomiting) or any signs of infections  including skin, wounds, and dental.    [x]  The Outpatient Pharmacy is available to fill your prescription here on your day of surgery, ask your preop nurse for details    [x] Other instructions: wear loose, comfortable clothing    EDUCATIONAL MATERIALS PROVIDED:    [] PAT Preoperative Education Packet/Booklet     [] Medication List    [] Transfusion bracelet applied with instructions    [] Shower with soap, lather and rinse well, and use CHG wipes provided the evening before surgery as instructed    [] Incentive spirometer with instructions

## 2022-10-27 ENCOUNTER — HOSPITAL ENCOUNTER (OUTPATIENT)
Age: 47
Setting detail: OUTPATIENT SURGERY
Discharge: HOME | End: 2022-10-27
Attending: SURGERY | Admitting: SURGERY
Payer: COMMERCIAL

## 2022-10-27 ENCOUNTER — ANESTHESIA EVENT (OUTPATIENT)
Dept: ENDOSCOPY | Age: 47
End: 2022-10-27
Payer: COMMERCIAL

## 2022-10-27 ENCOUNTER — ANESTHESIA (OUTPATIENT)
Dept: ENDOSCOPY | Age: 47
End: 2022-10-27
Payer: COMMERCIAL

## 2022-10-27 VITALS
HEART RATE: 79 BPM | WEIGHT: 250 LBS | TEMPERATURE: 97 F | OXYGEN SATURATION: 94 % | HEIGHT: 70 IN | SYSTOLIC BLOOD PRESSURE: 103 MMHG | RESPIRATION RATE: 16 BRPM | BODY MASS INDEX: 35.79 KG/M2 | DIASTOLIC BLOOD PRESSURE: 64 MMHG

## 2022-10-27 LAB — METER GLUCOSE: 235 MG/DL (ref 74–99)

## 2022-10-27 PROCEDURE — 82962 GLUCOSE BLOOD TEST: CPT

## 2022-10-27 PROCEDURE — 3609027000 HC COLONOSCOPY: Performed by: SURGERY

## 2022-10-27 PROCEDURE — 2709999900 HC NON-CHARGEABLE SUPPLY: Performed by: SURGERY

## 2022-10-27 PROCEDURE — 3700000000 HC ANESTHESIA ATTENDED CARE: Performed by: SURGERY

## 2022-10-27 PROCEDURE — 45378 DIAGNOSTIC COLONOSCOPY: CPT | Performed by: SURGERY

## 2022-10-27 PROCEDURE — 6360000002 HC RX W HCPCS: Performed by: NURSE ANESTHETIST, CERTIFIED REGISTERED

## 2022-10-27 PROCEDURE — 2580000003 HC RX 258: Performed by: NURSE ANESTHETIST, CERTIFIED REGISTERED

## 2022-10-27 PROCEDURE — 7100000010 HC PHASE II RECOVERY - FIRST 15 MIN: Performed by: SURGERY

## 2022-10-27 PROCEDURE — 3700000001 HC ADD 15 MINUTES (ANESTHESIA): Performed by: SURGERY

## 2022-10-27 PROCEDURE — 7100000011 HC PHASE II RECOVERY - ADDTL 15 MIN: Performed by: SURGERY

## 2022-10-27 PROCEDURE — 2500000003 HC RX 250 WO HCPCS: Performed by: NURSE ANESTHETIST, CERTIFIED REGISTERED

## 2022-10-27 RX ORDER — LIDOCAINE HYDROCHLORIDE 20 MG/ML
INJECTION, SOLUTION EPIDURAL; INFILTRATION; INTRACAUDAL; PERINEURAL PRN
Status: DISCONTINUED | OUTPATIENT
Start: 2022-10-27 | End: 2022-10-27 | Stop reason: SDUPTHER

## 2022-10-27 RX ORDER — PROPOFOL 10 MG/ML
INJECTION, EMULSION INTRAVENOUS PRN
Status: DISCONTINUED | OUTPATIENT
Start: 2022-10-27 | End: 2022-10-27 | Stop reason: SDUPTHER

## 2022-10-27 RX ORDER — SODIUM CHLORIDE 9 MG/ML
INJECTION, SOLUTION INTRAVENOUS CONTINUOUS PRN
Status: DISCONTINUED | OUTPATIENT
Start: 2022-10-27 | End: 2022-10-27 | Stop reason: SDUPTHER

## 2022-10-27 RX ADMIN — PROPOFOL 50 MG: 10 INJECTION, EMULSION INTRAVENOUS at 10:49

## 2022-10-27 RX ADMIN — SODIUM CHLORIDE: 9 INJECTION, SOLUTION INTRAVENOUS at 10:36

## 2022-10-27 RX ADMIN — PROPOFOL 50 MG: 10 INJECTION, EMULSION INTRAVENOUS at 10:45

## 2022-10-27 RX ADMIN — PROPOFOL 20 MG: 10 INJECTION, EMULSION INTRAVENOUS at 10:43

## 2022-10-27 RX ADMIN — LIDOCAINE HYDROCHLORIDE 40 MG: 20 INJECTION, SOLUTION EPIDURAL; INFILTRATION; INTRACAUDAL; PERINEURAL at 10:41

## 2022-10-27 RX ADMIN — PROPOFOL 150 MG: 10 INJECTION, EMULSION INTRAVENOUS at 10:41

## 2022-10-27 RX ADMIN — PROPOFOL 50 MG: 10 INJECTION, EMULSION INTRAVENOUS at 10:54

## 2022-10-27 RX ADMIN — PROPOFOL 50 MG: 10 INJECTION, EMULSION INTRAVENOUS at 10:52

## 2022-10-27 ASSESSMENT — PAIN SCALES - GENERAL
PAINLEVEL_OUTOF10: 0
PAINLEVEL_OUTOF10: 0

## 2022-10-27 ASSESSMENT — LIFESTYLE VARIABLES: SMOKING_STATUS: 0

## 2022-10-27 ASSESSMENT — PAIN - FUNCTIONAL ASSESSMENT: PAIN_FUNCTIONAL_ASSESSMENT: 0-10

## 2022-10-27 NOTE — H&P
[facility] Note     Assessment/Plan:        Diagnosis Orders   1. Rectal bleeding        Likely benign, hemorrhoidal related. She is due for colonoscopy we will plan for that                Return for Colonoscopy. Chief Complaint   Patient presents with    New Patient       Rectal bleeding          PCP: Nate Morrissey MD     HPI: Olga Hamlin is a 40 y.o. female who presents in consultation for rectal bleeding. She has not had colonoscopy previously. This was about a month ago. She has not had any issues recently with it. She denies any significant diarrhea or constipation. She has no rectal pain. She has no abdominal pain. She does have a family history of Crohn's in her sister. There is no family history of colon cancer. Past Medical History        Past Medical History:   Diagnosis Date    Depression      Diabetes mellitus (HCC)      GERD (gastroesophageal reflux disease)      Joint pain      Thyroid disease              Past Surgical History         Past Surgical History:   Procedure Laterality Date     SECTION        CHOLECYSTECTOMY        HYSTERECTOMY (CERVIX STATUS UNKNOWN)                Home Medications           Prior to Admission medications    Medication Sig Start Date End Date Taking?  Authorizing Provider   TRINTELLIX 10 MG TABS tablet TAKE 1 TABLET BY MOUTH EVERY DAY 22   Yes Historical Provider, MD   metFORMIN (GLUCOPHAGE-XR) 500 MG extended release tablet TAKE 2 TABLETS BY MOUTH TWICE A DAY 22   Yes Historical Provider, MD   levothyroxine (SYNTHROID) 75 MCG tablet TAKE 1 TABLET BY MOUTH EVERY DAY 22   Yes Historical Provider, MD   BD PEN NEEDLE MICRO U/F 32G X 6 MM MISC USES WITH INSULIN DAILY 22   Yes Historical Provider, MD Trejo Naomi strip TEST 4 TIMES A DAY AS NEEDED 22   Yes Historical Provider, MD   glipiZIDE (GLUCOTROL XL) 10 MG extended release tablet TAKE 1 TABLET BY MOUTH TWICE A DAY 22   Yes Yes Alexa Smith MD   Lancets MISC Use to test blood sugar 3 times a day 6/3/20   Yes Alexa Smith MD   aspirin 81 MG EC tablet Take 81 mg by mouth daily     Yes Historical Provider, MD   B Complex-C (SUPER B COMPLEX/VITAMIN C PO) Take by mouth daily     Yes Historical Provider, MD   loratadine (CLARITIN) 10 MG tablet Take 10 mg by mouth daily     Yes Historical Provider, MD   omeprazole (PRILOSEC) 20 MG delayed release capsule Take 1 capsule by mouth daily 4/22/20   Yes Jacquelin Coleman MD   Cholecalciferol (VITAMIN D3) 2000 units CAPS Take 2,000 Units by mouth daily      Yes Historical Provider, MD   ibuprofen (ADVIL;MOTRIN) 800 MG tablet Take 1 tablet by mouth every 6 hours as needed for Pain 2/8/22 7/12/22   Sharmila Salgado PA-C                 Allergies   Allergen Reactions    Celecoxib Hives         Social History   Social History            Socioeconomic History    Marital status: Unknown       Spouse name: None    Number of children: None    Years of education: None    Highest education level: None   Tobacco Use    Smoking status: Never    Smokeless tobacco: Never   Substance and Sexual Activity    Alcohol use: Never    Drug use: Never   Social History Narrative     ** Merged History Encounter **           PMH:  Problem List: Eruption, Infestation by Sarcoptes scabiei sherly hominis, Dysthymic disorder, Peptic reflux  disease, Dysthymia, Hypothyroidism, Adult health examination     Health Maintenance:  Mammogram - (10/1/2018)  Mammogram Screening - (10/1/2018)        Influenza Vaccination - (9/1/2015)     Medical Problems:  vitamin D def, Asthma  Hypothyroidism - partial thyroidectomy (half of left lobe); benign nodule  Anxiety  GERD - EGD 2011  Seasonal Allergies, Type 2 Diabetes     Surgical Hx:  C/S  Partial Hyste     rectomy - left ovary remains (bleeding issues)  Benita     FH:  Father:  . (Hx)  Mother:  . (Hx)  Mom - thyroid cancer, melanoma  Dad - depression  cousin - melanoma     SH: Jaxon Miranda (Marital)Stay at home;  3 kids (8,7,5 as of 2014). No complications during pregn     yadira. Tyson Slim ; works at [employer]  Personal Habits: Cigarette Use: Nonsmoker. Alcohol: Denies alcohol use. Social Determinants of Health          Financial Resource Strain: Low Risk     Difficulty of Paying Living Expenses: Not hard at all   Food Insecurity: No Food Insecurity    Worried About Running Out of Food in the Last Year: Never true    Ran Out of Food in the Last Year: Never true            Family History   No family history on file. Review of Systems   All other systems reviewed and are negative. Objective:  Vitals       Vitals:     08/02/22 1508   BP: 127/77   Pulse: 95   Resp: 18   Temp: 97.3 °F (36.3 °C)   TempSrc: Temporal   SpO2: 98%   Weight: 260 lb (117.9 kg)   Height: 5' 10\" (1.778 m)            Physical Exam  HENT:      Head: Normocephalic and atraumatic. Eyes:      General:         Right eye: No discharge. Left eye: No discharge. Neck:      Trachea: No tracheal deviation. Cardiovascular:      Rate and Rhythm: Normal rate. Pulmonary:      Effort: Pulmonary effort is normal. No respiratory distress. Abdominal:      General: There is no distension. Palpations: Abdomen is soft. Tenderness: There is no abdominal tenderness. There is no guarding or rebound. Genitourinary:     Comments: Hemorrhoid  Skin:     General: Skin is warm and dry. Neurological:      Mental Status: She is alert and oriented to person, place, and time. Ileana Palmer MD        NOTE: This report, in part or full,may have been transcribed using voice recognition software. Every effort was made to ensure accuracy; however, inadvertent computerized transcription errors may be present. Please excuse any transcriptional grammatical or spelling errors that may have escaped my editorial review.      CC: Ronel Le MD

## 2022-10-27 NOTE — ANESTHESIA POSTPROCEDURE EVALUATION
Department of Anesthesiology  Postprocedure Note    Patient: Raisa Israel  MRN: [id number]  YOB: 1975  Date of evaluation: 10/27/2022      Procedure Summary     Date: 10/27/22 Room / Location: [facility] / [facility]    Anesthesia Start: 1036 Anesthesia Stop: 1103    Procedure: COLONOSCOPY DIAGNOSTIC Diagnosis:       Rectal bleeding      (Rectal bleeding [K62.5])    Surgeons: Jayant Hurst MD Responsible Provider: Neno Weber DO    Anesthesia Type: MAC ASA Status: 3          Anesthesia Type: MAC    Codey Phase I: Codey Score: 10    Codey Phase II:        Anesthesia Post Evaluation    Patient location during evaluation: bedside  Patient participation: complete - patient participated  Level of consciousness: sleepy but conscious  Pain score: 0  Airway patency: patent  Nausea & Vomiting: no nausea and no vomiting  Complications: no  Cardiovascular status: blood pressure returned to baseline  Respiratory status: acceptable  Hydration status: euvolemic  Comments: Seen and examined. Progressing as expected. No questions or concerns.

## 2022-10-27 NOTE — OP NOTE
Colonoscopy Op Note  PATIENT: Elbert Olivares    DATE OF PROCEDURE: 10/27/2022    SURGEON: Sharon He MD    PREOPERATIVE DIAGNOSIS: Diagnostic colonoscopy for rectal bleeding    POSTOPERATIVE DIAGNOSIS: Same, small hemorrhoids    OPERATION: Procedure(s):  COLONOSCOPY DIAGNOSTIC    ANESTHESIA: Local monitored anesthesia. ESTIMATED BLOOD LOSS: nil     COMPLICATIONS: None. SPECIMENS:   * No specimens in log *    HISTORY: The patient is a 40y.o. year old female with history of above preop diagnosis. I recommended colonoscopy with possible biopsy or polypectomy and I explained the risk, benefits, expected outcome, and alternatives to the procedure. Risks included but are not limited to bleeding, infection, respiratory distress, hypotension, and perforation of the colon. The patient understands and is in agreement. PROCEDURE: The patient was given IV conscious sedation per anesthesia. The patient was given supplemental oxygen by nasal cannula. The colonoscope was inserted per rectum and advanced under direct vision to the cecum without difficulty, identified by appendiceal orifice and ileocecal valve. The prep was good so exam was adequate. FINDINGS:    LEONA: Small hemorrhoids with a few fibroepithelial tags    Terminal Ileum: normal    Colon: normal    Rectum/Anus: examined in normal and retroflexed positions -small hemorrhoid    The colon was decompressed and the scope was removed. The withdraw time was approximately 9 minutes. The patient tolerated the procedure well. ASSESSMENT/PLAN:   Rectal bleeding has resolved, monitor  Colorectal Cancer Screening - recommend repeat colonoscopy in  7 years (may change pending biopsy results). Sooner if issues/concerns.     Sharon He MD  10/27/22  11:03 AM

## 2022-10-27 NOTE — ANESTHESIA PRE PROCEDURE
Department of Anesthesiology  Preprocedure Note       Name:  Rosalba Simental   Age:  40 y.o.  :  1975                                          MRN:  [id number]         Date:  10/27/2022      Surgeon: Thien Dubon):  Domi Parry MD    Procedure: Procedure(s):  COLONOSCOPY DIAGNOSTIC    Medications prior to admission:   Prior to Admission medications    Medication Sig Start Date End Date Taking? Authorizing Provider   Faisal Beaver strip TEST 4 TIMES A DAY AS NEEDED 10/13/22   Krystin Aguiar MD   BD PEN NEEDLE MICRO U/F 32G X 6 MM MISC USES WITH INSULIN DAILY 22   Historical Provider, MD   glipiZIDE (GLUCOTROL XL) 10 MG extended release tablet TAKE 1 TABLET BY MOUTH TWICE A DAY 22   Historical Provider, MD   glipiZIDE (GLUCOTROL XL) 10 MG extended release tablet TAKE 1 TABLET BY MOUTH TWICE A DAY 22   Krystin Aguiar MD   levothyroxine (SYNTHROID) 75 MCG tablet TAKE 1 TABLET BY MOUTH EVERY DAY 22   Krystin Aguiar MD   metFORMIN (GLUCOPHAGE-XR) 500 MG extended release tablet TAKE 2 TABLETS BY MOUTH TWICE A DAY 22   Krystin Aguiar MD   Insulin Degludec (TRESIBA FLEXTOUCH) 100 UNIT/ML SOPN Inject 30 Units into the skin daily 10/11/21   Krystin Aguiar MD   Insulin Aspart, w/Niacinamide, (FIASP FLEXTOUCH) 100 UNIT/ML SOPN 10 units before meals plus a scale.  A max of 50 units/day 10/11/21   Krystin Aguiar MD   Insulin Pen Needle (BD PEN NEEDLE MICRO U/F) 32G X 6 MM MISC Uses with insulin daily 10/11/21   Krystin Aguiar MD   Blood Glucose Monitoring Suppl (ONETOUCH VERIO REFLECT) w/Device KIT TEST 4 TIMES A DAY 21   Chan Charles MD   Lancets (Cyndy Jay) [facility] To test 4x/day 21   Krystin Aguiar MD   Continuous Blood Gluc Transmit (DEXCOM G6 TRANSMITTER) MISC To change every 90 days 21   Krystin Aguiar MD   Continuous Blood Gluc  (Tejal Bola) JACQUI To use with sensors 21   Krystin Aguiar MD VORTIoxetine (TRINTELLIX) 10 MG TABS tablet Take 1 tablet by mouth daily 2/26/21   Ernie Palomino MD   cetirizine (ZYRTEC) 10 MG tablet Take 1 tablet by mouth daily 11/19/20   Matt Parisi,    Blood Glucose Monitoring Suppl MISC Blood glucose meter - use as directed 6/3/20   Caroline Matute MD   Lancets MISC Use to test blood sugar 3 times a day 6/3/20   Caroline Matute MD   aspirin 81 MG EC tablet Take 81 mg by mouth daily    Historical Provider, MD   loratadine (CLARITIN) 10 MG tablet Take 10 mg by mouth daily    Historical Provider, MD   omeprazole (PRILOSEC) 20 MG delayed release capsule Take 1 capsule by mouth daily 4/22/20   Ernie Palomino MD   Cholecalciferol (VITAMIN D3) 2000 units CAPS Take 2,000 Units by mouth daily     Historical Provider, MD       Current medications:    No current facility-administered medications for this encounter. Current Outpatient Medications   Medication Sig Dispense Refill    ONETOUCH VERIO strip TEST 4 TIMES A DAY AS NEEDED 300 strip 5    BD PEN NEEDLE MICRO U/F 32G X 6 MM MISC USES WITH INSULIN DAILY      glipiZIDE (GLUCOTROL XL) 10 MG extended release tablet TAKE 1 TABLET BY MOUTH TWICE A DAY      glipiZIDE (GLUCOTROL XL) 10 MG extended release tablet TAKE 1 TABLET BY MOUTH TWICE A  tablet 5    levothyroxine (SYNTHROID) 75 MCG tablet TAKE 1 TABLET BY MOUTH EVERY DAY 90 tablet 5    metFORMIN (GLUCOPHAGE-XR) 500 MG extended release tablet TAKE 2 TABLETS BY MOUTH TWICE A  tablet 5    Insulin Degludec (TRESIBA FLEXTOUCH) 100 UNIT/ML SOPN Inject 30 Units into the skin daily 10 pen 3    Insulin Aspart, w/Niacinamide, (FIASP FLEXTOUCH) 100 UNIT/ML SOPN 10 units before meals plus a scale.  A max of 50 units/day 15 pen 3    Insulin Pen Needle (BD PEN NEEDLE MICRO U/F) 32G X 6 MM MISC Uses with insulin daily 150 each 5    Blood Glucose Monitoring Suppl (ONETOUCH VERIO REFLECT) w/Device KIT TEST 4 TIMES A DAY 1 kit 5    Lancets (ONETOUCH DELICA PLUS [id number]) MISC To test 4x/day 200 each 11    Continuous Blood Gluc Transmit (DEXCOM G6 TRANSMITTER) MISC To change every 90 days 1 each 3    Continuous Blood Gluc  (DEXCOM G6 ) JACQUI To use with sensors 1 Device 0    VORTIoxetine (TRINTELLIX) 10 MG TABS tablet Take 1 tablet by mouth daily 30 tablet 0    cetirizine (ZYRTEC) 10 MG tablet Take 1 tablet by mouth daily 30 tablet 1    Blood Glucose Monitoring Suppl MISC Blood glucose meter - use as directed 1 each 1    Lancets MISC Use to test blood sugar 3 times a day 100 each 5    aspirin 81 MG EC tablet Take 81 mg by mouth daily      loratadine (CLARITIN) 10 MG tablet Take 10 mg by mouth daily      omeprazole (PRILOSEC) 20 MG delayed release capsule Take 1 capsule by mouth daily 90 capsule 3    Cholecalciferol (VITAMIN D3) 2000 units CAPS Take 2,000 Units by mouth daily          Allergies:     Allergies   Allergen Reactions    Celecoxib Hives       Problem List:    Patient Active Problem List   Diagnosis Code    Type 2 diabetes mellitus with hyperglycemia, without long-term current use of insulin (HCC) E11.65    Liver function study, abnormal R94.5    Thyroid nodule E04.1    Hypothyroidism E03.9    Vitamin D deficiency E55.9    Inflammatory polyarthropathy (HCC) M06.4    LEXIS (obstructive sleep apnea) G47.33    Anemia D64.9    Kidney stone on left side N20.0    Mixed hyperlipidemia E78.2    Gastroesophageal reflux disease without esophagitis K21.9    Vaginal candidiasis B37.31    Abscess of groin, left L02.214    Vitamin B12 deficiency E53.8    Vertigo R42    Allergic rhinitis J30.9    Elevated antinuclear antibody (DIANELYS) level R76.8    Microcytosis R71.8    Lactose intolerance E73.9    Hypokalemia E87.6    History of thyroid cancer Z85.850    Dysthymia F34.1    Rectal bleeding K62.5       Past Medical History:        Diagnosis Date    Depression     Diabetes mellitus (HCC)     GERD (gastroesophageal reflux disease)     History of rectal bleeding     Encounter for colonoscopy    Joint pain     LEXIS on CPAP     Thyroid disease        Past Surgical History:        Procedure Laterality Date     SECTION      CHOLECYSTECTOMY      HYSTERECTOMY (CERVIX STATUS UNKNOWN)         Social History:    Social History     Tobacco Use    Smoking status: Never    Smokeless tobacco: Never   Substance Use Topics    Alcohol use: Never                                Counseling given: Not Answered      Vital Signs (Current):   Vitals:    10/25/22 1250   Weight: 250 lb (113.4 kg)   Height: 5' 10\" (1.778 m)                                              BP Readings from Last 3 Encounters:   22 127/77   22 (!) 147/78   22 128/82       NPO Status:                                                                                 BMI:   Wt Readings from Last 3 Encounters:   22 260 lb (117.9 kg)   22 260 lb (117.9 kg)   22 261 lb (118.4 kg)     Body mass index is 35.87 kg/m².     CBC:   Lab Results   Component Value Date/Time    WBC 6.4 2022 07:58 AM    RBC 5.31 2022 07:58 AM    HGB 12.7 2022 07:58 AM    HCT 40.9 2022 07:58 AM    MCV 77.0 2022 07:58 AM    RDW 17.2 2022 07:58 AM     2022 07:58 AM       CMP:   Lab Results   Component Value Date/Time     2022 07:58 AM    K 4.0 2022 07:58 AM    K 3.9 2022 10:41 PM     2022 07:58 AM    CO2 23 2022 07:58 AM    BUN 13 2022 07:58 AM    CREATININE 0.6 2022 07:58 AM    GFRAA >60 2022 07:58 AM    LABGLOM >60 2022 07:58 AM    GLUCOSE 265 2022 07:58 AM    PROT 7.0 2022 07:58 AM    CALCIUM 9.2 2022 07:58 AM    BILITOT 0.7 2022 07:58 AM    ALKPHOS 91 2022 07:58 AM    AST 22 2022 07:58 AM    ALT 25 2022 07:58 AM       POC Tests: No results for input(s): POCGLU, POCNA, POCK, POCCL, POCBUN, POCHEMO, POCHCT in the last 72 hours. Coags: No results found for: PROTIME, INR, APTT    HCG (If Applicable):   Lab Results   Component Value Date    PREGTESTUR neg 03/04/2022        ABGs: No results found for: PHART, PO2ART, RGJ0UTQ, JVK6XKJ, BEART, [id number]     Type & Screen (If Applicable):  No results found for: LABABO, LABRH    Drug/Infectious Status (If Applicable):  No results found for: HIV, HEPCAB    COVID-19 Screening (If Applicable):   Lab Results   Component Value Date/Time    COVID19 Not Detected 12/08/2020 12:00 PM           Anesthesia Evaluation  Patient summary reviewed and Nursing notes reviewed no history of anesthetic complications:   Airway: Mallampati: III  TM distance: >3 FB   Neck ROM: full  Mouth opening: > = 3 FB   Dental:          Pulmonary:normal exam  breath sounds clear to auscultation  (+) sleep apnea: on CPAP,      (-) COPD, asthma and not a current smoker                          ROS comment: Seasonal allergies/allergic rhinitis   Cardiovascular:  Exercise tolerance: good (>4 METS),       (-) past MI, CAD, CABG/stent, dysrhythmias and  angina    ECG reviewed  Rhythm: regular  Rate: normal           Beta Blocker:  Not on Beta Blocker      ROS comment: EKG:  Normal sinus rhythm  Normal ECG  When compared with ECG of 26-MAY-2021 19:02,  No significant change was found     Neuro/Psych:   (+) psychiatric history:   (-) seizures, TIA and CVA           GI/Hepatic/Renal:   (+) GERD: no interval change, renal disease: kidney stones, bowel prep,      (-) hepatitis      ROS comment: Rectal bleeding. Endo/Other:    (+) Diabetes (A1C 10.4%)Type II DM, poorly controlled, using insulin, hypothyroidism, blood dyscrasia (Resolved): anemia, arthritis: OA., electrolyte abnormalities, . Pt had no PAT visit        ROS comment: Thyroid nodule Abdominal:   (+) obese,           Vascular: negative vascular ROS. - DVT and PE.       Other Findings:           Anesthesia Plan      MAC     ASA 3       Induction: intravenous. Anesthetic plan and risks discussed with patient. Plan discussed with CRNA. DOS STAFF ADDENDUM:    Patient seen and examined, physical exam updated as needed, chart reviewed. NPO status confirmed. Anesthetic options and risks discussed with patient/legal guardian. Patient/legal guardian verbalized understanding and agrees to proceed.      DEYANIRA Ortiz - CRNA  Staff CRNA  October 27, 2022  10:40 AM                    [address] [place] [address]   10/27/2022

## 2022-11-15 ENCOUNTER — OFFICE VISIT (OUTPATIENT)
Dept: SURGERY | Age: 47
End: 2022-11-15
Payer: COMMERCIAL

## 2022-11-15 VITALS
WEIGHT: 250 LBS | HEART RATE: 74 BPM | SYSTOLIC BLOOD PRESSURE: 132 MMHG | OXYGEN SATURATION: 98 % | BODY MASS INDEX: 35.79 KG/M2 | RESPIRATION RATE: 18 BRPM | DIASTOLIC BLOOD PRESSURE: 84 MMHG | HEIGHT: 70 IN | TEMPERATURE: 97.2 F

## 2022-11-15 DIAGNOSIS — K64.8 OTHER HEMORRHOIDS: Primary | ICD-10-CM

## 2022-11-15 PROCEDURE — 99213 OFFICE O/P EST LOW 20 MIN: CPT | Performed by: SURGERY

## 2022-11-19 LAB
ORGANISM: ABNORMAL
URINE CULTURE, ROUTINE: ABNORMAL

## 2022-11-27 NOTE — PROGRESS NOTES
111 [facility] Surgery Clinic Note    Assessment/Plan:     Diagnosis Orders   1. Hemorrhoids      Likely source of bleeding. Her bleeding has resolved. Colonoscopy otherwise negative. Repeat colonoscopy 7 years          Return if symptoms worsen or fail to improve. Chief Complaint   Patient presents with    Results     Colonoscopy        PCP: Mirian Gómez MD    HPI: Bentia Guan is a 56 y.o. female here for follow-up of colonoscopy for rectal bleeding. Colonoscopy was negative aside from some small hemorrhoids. She is doing well overall. She has no abdominal pain. Her bowels move well without diarrhea or constipation. There is no straining. She has not had any further bleeding episodes. Review of Systems   All other systems reviewed and are negative. Past Medical History:   Diagnosis Date    Depression     Diabetes mellitus ([facility] [address]     GERD (gastroesophageal reflux disease)     History of rectal bleeding     Encounter for colonoscopy    Joint pain     LEXIS on CPAP     Thyroid disease        Past Surgical History:   Procedure Laterality Date     SECTION      CHOLECYSTECTOMY      COLONOSCOPY N/A 10/27/2022    COLONOSCOPY DIAGNOSTIC performed by Elizabeth Hammond MD at [address] [place] [address] ([facility])         No family history on file.     Social History     Socioeconomic History    Marital status: Unknown     Spouse name: Not on file    Number of children: Not on file    Years of education: Not on file    Highest education level: Not on file   Occupational History    Not on file   Tobacco Use    Smoking status: Never    Smokeless tobacco: Never   Vaping Use    Vaping Use: Never used   Substance and Sexual Activity    Alcohol use: Never    Drug use: Never    Sexual activity: Not on file   Other Topics Concern    Not on file   Social History Narrative    ** Merged History Encounter **         PMH:  Problem List: Eruption, Infestation by Sarcoptes scabiei sherly hominis, Dysthymic disorder, Peptic reflux  disease, Dysthymia, Hypothyroidism, Adult health examination    Health Maintenance:  Mammogram - (10/1/2018)  Mammogram Screening - (10/1/2018)      Influenza Vaccination - (9/1/2015)    Medical Problems:  vitamin D def, Asthma  Hypothyroidism - partial thyroidectomy (half of left lobe); benign nodule  Anxiety  GERD - EGD 2011  Seasonal Allergies, Type 2 Diabetes    Surgical Hx:  C/S  Partial Hyste    rectomy - left ovary remains (bleeding issues)  Benita    FH:  Father:  . (Hx)  Mother:  . (Hx)  Mom - thyroid cancer, melanoma  Dad - depression  cousin - melanoma    SH:  . (Marital)Stay at home;  3 kids (8,7,5 as of 2014). No complications during pregn    yadira. Bridger Mix ; works at [employer]  Personal Habits: Cigarette Use: Nonsmoker. Alcohol: Denies alcohol use.      Social Determinants of Health     Financial Resource Strain: Low Risk     Difficulty of Paying Living Expenses: Not hard at all   Food Insecurity: No Food Insecurity    Worried About Running Out of Food in the Last Year: Never true    Ran Out of Food in the Last Year: Never true   Transportation Needs: Not on file   Physical Activity: Not on file   Stress: Not on file   Social Connections: Not on file   Intimate Partner Violence: Not on file   Housing Stability: Not on file       Allergies   Allergen Reactions    Celecoxib Hives         Current Outpatient Medications   Medication Sig Dispense Refill    ONETOUCH VERIO strip TEST 4 TIMES A DAY AS NEEDED 300 strip 5    BD PEN NEEDLE MICRO U/F 32G X 6 MM MISC USES WITH INSULIN DAILY      glipiZIDE (GLUCOTROL XL) 10 MG extended release tablet TAKE 1 TABLET BY MOUTH TWICE A DAY      glipiZIDE (GLUCOTROL XL) 10 MG extended release tablet TAKE 1 TABLET BY MOUTH TWICE A  tablet 5    levothyroxine (SYNTHROID) 75 MCG tablet TAKE 1 TABLET BY MOUTH EVERY DAY 90 tablet 5    metFORMIN (GLUCOPHAGE-XR) 500 MG extended release tablet TAKE 2 TABLETS BY MOUTH TWICE A  tablet 5 Insulin Degludec (TRESIBA FLEXTOUCH) 100 UNIT/ML SOPN Inject 30 Units into the skin daily 10 pen 3    Insulin Aspart, w/Niacinamide, (FIASP FLEXTOUCH) 100 UNIT/ML SOPN 10 units before meals plus a scale. A max of 50 units/day 15 pen 3    Insulin Pen Needle (BD PEN NEEDLE MICRO U/F) 32G X 6 MM MISC Uses with insulin daily 150 each 5    Blood Glucose Monitoring Suppl (ONETOUCH VERIO REFLECT) w/Device KIT TEST 4 TIMES A DAY 1 kit 5    Lancets (ONETOUCH DELICA PLUS INCKGS99E) MISC To test 4x/day 200 each 11    Continuous Blood Gluc Transmit (DEXCOM G6 TRANSMITTER) MISC To change every 90 days 1 each 3    Continuous Blood Gluc  (DEXCOM G6 ) JACQUI To use with sensors 1 Device 0    VORTIoxetine (TRINTELLIX) 10 MG TABS tablet Take 1 tablet by mouth daily 30 tablet 0    cetirizine (ZYRTEC) 10 MG tablet Take 1 tablet by mouth daily 30 tablet 1    Blood Glucose Monitoring Suppl MISC Blood glucose meter - use as directed 1 each 1    Lancets MISC Use to test blood sugar 3 times a day 100 each 5    aspirin 81 MG EC tablet Take 81 mg by mouth daily      loratadine (CLARITIN) 10 MG tablet Take 10 mg by mouth daily      omeprazole (PRILOSEC) 20 MG delayed release capsule Take 1 capsule by mouth daily 90 capsule 3    Cholecalciferol (VITAMIN D3) 2000 units CAPS Take 2,000 Units by mouth daily        No current facility-administered medications for this visit. The remainder of the past medical, past surgical, family, and psychosocial history, as well as medication and allergy review, were completed and are as documented elsewhere in the chart. Objective:  Vitals:    11/15/22 1403   BP: 132/84   Pulse: 74   Resp: 18   Temp: 97.2 °F (36.2 °C)   TempSrc: Temporal   SpO2: 98%   Weight: 250 lb (113.4 kg)   Height: 5' 10\" (1.778 m)          Physical Exam  Constitutional:       General: She is not in acute distress. Appearance: She is not diaphoretic.    Cardiovascular:      Rate and Rhythm: Normal rate.   Pulmonary:      Effort: Pulmonary effort is normal. No respiratory distress. Abdominal:      General: There is no distension. Palpations: Abdomen is soft. Tenderness: There is no abdominal tenderness. There is no guarding or rebound. Leon Bajwa MD      I have examined the patient and performed the key aspects of physical exam, reviewed the record (including all pertinent and new radiology images and laboratory findings, referring provider notes and results), and discussed the case with the primary and referring provider where applicable. NOTE: This report, in part or full, may have been transcribed using voice recognition software. Every effort was made to ensure accuracy; however, inadvertent computerized transcription errors may be present. Please excuse any transcriptional grammatical or spelling errors that may have escaped my editorial review.       CC: Segundo Barrett MD

## 2023-04-20 ENCOUNTER — OFFICE VISIT (OUTPATIENT)
Dept: FAMILY MEDICINE CLINIC | Age: 48
End: 2023-04-20
Payer: COMMERCIAL

## 2023-04-20 VITALS
TEMPERATURE: 97.6 F | WEIGHT: 260 LBS | HEART RATE: 93 BPM | BODY MASS INDEX: 37.22 KG/M2 | SYSTOLIC BLOOD PRESSURE: 144 MMHG | DIASTOLIC BLOOD PRESSURE: 88 MMHG | HEIGHT: 70 IN | RESPIRATION RATE: 18 BRPM | OXYGEN SATURATION: 99 %

## 2023-04-20 DIAGNOSIS — S99.921A INJURY OF RIGHT GREAT TOE, INITIAL ENCOUNTER: ICD-10-CM

## 2023-04-20 DIAGNOSIS — S92.534A CLOSED NONDISPLACED FRACTURE OF DISTAL PHALANX OF LESSER TOE OF RIGHT FOOT, INITIAL ENCOUNTER: ICD-10-CM

## 2023-04-20 DIAGNOSIS — S91.211A LACERATION OF RIGHT GREAT TOE WITHOUT FOREIGN BODY WITH DAMAGE TO NAIL, INITIAL ENCOUNTER: ICD-10-CM

## 2023-04-20 DIAGNOSIS — S92.421B OPEN DISPLACED FRACTURE OF DISTAL PHALANX OF RIGHT GREAT TOE, INITIAL ENCOUNTER: Primary | ICD-10-CM

## 2023-04-20 PROCEDURE — 99214 OFFICE O/P EST MOD 30 MIN: CPT | Performed by: PHYSICIAN ASSISTANT

## 2023-04-20 PROCEDURE — 90471 IMMUNIZATION ADMIN: CPT | Performed by: PHYSICIAN ASSISTANT

## 2023-04-20 PROCEDURE — 90715 TDAP VACCINE 7 YRS/> IM: CPT | Performed by: PHYSICIAN ASSISTANT

## 2023-04-20 PROCEDURE — L4387 NON-PNEUM WALK BOOT PRE OTS: HCPCS | Performed by: PHYSICIAN ASSISTANT

## 2023-04-20 RX ORDER — AMOXICILLIN AND CLAVULANATE POTASSIUM 875; 125 MG/1; MG/1
1 TABLET, FILM COATED ORAL 2 TIMES DAILY
Qty: 20 TABLET | Refills: 0 | Status: SHIPPED | OUTPATIENT
Start: 2023-04-20 | End: 2023-04-30

## 2023-04-20 RX ORDER — OXYCODONE HYDROCHLORIDE AND ACETAMINOPHEN 5; 325 MG/1; MG/1
1 TABLET ORAL EVERY 6 HOURS PRN
Qty: 12 TABLET | Refills: 0 | Status: SHIPPED | OUTPATIENT
Start: 2023-04-20 | End: 2023-04-23

## 2023-04-20 NOTE — PROGRESS NOTES
30 tablet, Rfl: 0    cetirizine (ZYRTEC) 10 MG tablet, Take 1 tablet by mouth daily, Disp: 30 tablet, Rfl: 1    Blood Glucose Monitoring Suppl MISC, Blood glucose meter - use as directed, Disp: 1 each, Rfl: 1    Lancets MISC, Use to test blood sugar 3 times a day, Disp: 100 each, Rfl: 5    aspirin 81 MG EC tablet, Take 81 mg by mouth daily, Disp: , Rfl:     loratadine (CLARITIN) 10 MG tablet, Take 10 mg by mouth daily, Disp: , Rfl:     omeprazole (PRILOSEC) 20 MG delayed release capsule, Take 1 capsule by mouth daily, Disp: 90 capsule, Rfl: 3    Cholecalciferol (VITAMIN D3) 2000 units CAPS, Take 2,000 Units by mouth daily , Disp: , Rfl:     Allergies: Allergies   Allergen Reactions    Celecoxib Hives       Social History:     Social History     Tobacco Use    Smoking status: Never    Smokeless tobacco: Never   Vaping Use    Vaping Use: Never used   Substance Use Topics    Alcohol use: Never    Drug use: Never       Patient lives at home. Physical Exam:     Vitals:    04/20/23 1225   BP: (!) 144/88   Pulse: 93   Resp: 18   Temp: 97.6 °F (36.4 °C)   SpO2: 99%   Weight: 260 lb (117.9 kg)   Height: 5' 10\" (1.778 m)       Exam:  Physical Exam  Vital signs reviewed and nurse's notes. The patient is not hypoxic. General: Alert, no acute distress, patient resting comfortably   Skin: warm, intact, no pallor noted   Head: Normocephalic, atraumatic   Eye: Normal conjunctiva   Respiratory: No acute distress   Musculoskeletal: The patient has no obvious deformity noted to the right great toe but there is definite laceration noted to the proximal nail just superior to the nail fold, the laceration is 1.5 cm in transverse, the root of the nail is exposed. There is no evidence of foreign body. There are some ecchymosis and a very slight subungual hematoma noted to the lateral portion of the nail of the right great toe less than 15%.   There are some bruising noted to the distal phalanx of the right second toe and some

## 2023-04-27 ENCOUNTER — OFFICE VISIT (OUTPATIENT)
Dept: PODIATRY | Age: 48
End: 2023-04-27
Payer: COMMERCIAL

## 2023-04-27 VITALS
DIASTOLIC BLOOD PRESSURE: 74 MMHG | BODY MASS INDEX: 37.31 KG/M2 | SYSTOLIC BLOOD PRESSURE: 123 MMHG | TEMPERATURE: 98 F | WEIGHT: 260 LBS

## 2023-04-27 DIAGNOSIS — S92.421A CLOSED DISPLACED FRACTURE OF DISTAL PHALANX OF RIGHT GREAT TOE, INITIAL ENCOUNTER: ICD-10-CM

## 2023-04-27 DIAGNOSIS — S90.31XA CONTUSION OF RIGHT FOOT, INITIAL ENCOUNTER: Primary | ICD-10-CM

## 2023-04-27 PROCEDURE — 99213 OFFICE O/P EST LOW 20 MIN: CPT | Performed by: PODIATRIST

## 2023-05-11 ENCOUNTER — OFFICE VISIT (OUTPATIENT)
Dept: PODIATRY | Age: 48
End: 2023-05-11
Payer: COMMERCIAL

## 2023-05-11 VITALS
SYSTOLIC BLOOD PRESSURE: 124 MMHG | DIASTOLIC BLOOD PRESSURE: 74 MMHG | WEIGHT: 260 LBS | HEIGHT: 70 IN | BODY MASS INDEX: 37.22 KG/M2 | TEMPERATURE: 97.9 F

## 2023-05-11 DIAGNOSIS — S92.421D CLOSED DISPLACED FRACTURE OF DISTAL PHALANX OF RIGHT GREAT TOE WITH ROUTINE HEALING, SUBSEQUENT ENCOUNTER: Primary | ICD-10-CM

## 2023-05-11 DIAGNOSIS — S92.901D CLOSED FRACTURE OF RIGHT FOOT WITH ROUTINE HEALING, SUBSEQUENT ENCOUNTER: ICD-10-CM

## 2023-05-11 PROCEDURE — 99213 OFFICE O/P EST LOW 20 MIN: CPT | Performed by: PODIATRIST

## 2023-05-11 NOTE — PROGRESS NOTES
phalanges of the 1st and 2nd digits. Assessment and Plan: Patient is to transition into a postop shoe weightbearing as tolerated for 2-3  The patient was dispensed a postop shoe. It is medically necessary and within the standard of care for the patient's diagnosis. Its purpose is to control the motion of the foot and to allow a decrease in inflammation. The patient was instructed on its proper application and use. The patient was also instructed to watch for areas of rubbing, irritation, blister formation, or any other signs of abnormal pressure. If this occurs, the patient is to contact the office immediately. The ABN was reviewed and signed by the patient prior to leaving this appointment. Elsa Helton was seen today for foot pain. Diagnoses and all orders for this visit:    Closed fracture of right foot with routine healing, subsequent encounter  -     XR FOOT RIGHT (MIN 3 VIEWS); Future        No follow-ups on file. Seen By:  Nancy Amaral DPM      Document was created using voice recognition software. Note was reviewed, however may contain grammatical errors.

## 2023-06-01 ENCOUNTER — TELEPHONE (OUTPATIENT)
Dept: PODIATRY | Age: 48
End: 2023-06-01

## 2023-06-01 NOTE — TELEPHONE ENCOUNTER
Pts  called in wanting to speak with Sue Brine regarding the importance of todays visit. He said that they lost their insurance.   He was also wondering if the follow up fell into a global pricing??

## 2023-06-28 DIAGNOSIS — E11.65 TYPE 2 DIABETES MELLITUS WITH HYPERGLYCEMIA, WITHOUT LONG-TERM CURRENT USE OF INSULIN (HCC): ICD-10-CM

## 2023-06-28 DIAGNOSIS — E04.1 THYROID NODULE: ICD-10-CM

## 2023-06-28 DIAGNOSIS — E03.9 HYPOTHYROIDISM, UNSPECIFIED TYPE: ICD-10-CM

## 2023-06-28 RX ORDER — LEVOTHYROXINE SODIUM 0.07 MG/1
TABLET ORAL
Qty: 90 TABLET | Refills: 5 | OUTPATIENT
Start: 2023-06-28

## 2023-06-28 RX ORDER — METFORMIN HYDROCHLORIDE 500 MG/1
TABLET, EXTENDED RELEASE ORAL
Qty: 360 TABLET | Refills: 5 | OUTPATIENT
Start: 2023-06-28

## 2023-08-04 ENCOUNTER — OFFICE VISIT (OUTPATIENT)
Dept: FAMILY MEDICINE CLINIC | Age: 48
End: 2023-08-04

## 2023-08-04 VITALS
BODY MASS INDEX: 36.5 KG/M2 | TEMPERATURE: 97.1 F | OXYGEN SATURATION: 98 % | HEART RATE: 71 BPM | SYSTOLIC BLOOD PRESSURE: 116 MMHG | WEIGHT: 254.4 LBS | DIASTOLIC BLOOD PRESSURE: 78 MMHG

## 2023-08-04 DIAGNOSIS — R05.1 ACUTE COUGH: ICD-10-CM

## 2023-08-04 DIAGNOSIS — R05.1 ACUTE COUGH: Primary | ICD-10-CM

## 2023-08-04 PROCEDURE — 99213 OFFICE O/P EST LOW 20 MIN: CPT | Performed by: FAMILY MEDICINE

## 2023-08-04 PROCEDURE — 96372 THER/PROPH/DIAG INJ SC/IM: CPT | Performed by: FAMILY MEDICINE

## 2023-08-04 RX ORDER — CEFDINIR 300 MG/1
300 CAPSULE ORAL 2 TIMES DAILY
Qty: 14 CAPSULE | Refills: 0 | Status: SHIPPED
Start: 2023-08-04 | End: 2023-08-04 | Stop reason: SDUPTHER

## 2023-08-04 RX ORDER — METHYLPREDNISOLONE ACETATE 80 MG/ML
80 INJECTION, SUSPENSION INTRA-ARTICULAR; INTRALESIONAL; INTRAMUSCULAR; SOFT TISSUE ONCE
Status: COMPLETED | OUTPATIENT
Start: 2023-08-04 | End: 2023-08-04

## 2023-08-04 RX ORDER — CEFDINIR 300 MG/1
300 CAPSULE ORAL 2 TIMES DAILY
Qty: 14 CAPSULE | Refills: 0 | Status: SHIPPED | OUTPATIENT
Start: 2023-08-04 | End: 2023-08-11

## 2023-08-04 RX ADMIN — METHYLPREDNISOLONE ACETATE 80 MG: 80 INJECTION, SUSPENSION INTRA-ARTICULAR; INTRALESIONAL; INTRAMUSCULAR; SOFT TISSUE at 13:33

## 2023-08-04 ASSESSMENT — ENCOUNTER SYMPTOMS
COUGH: 1
SINUS PAIN: 1
EYES NEGATIVE: 1
SORE THROAT: 1
GASTROINTESTINAL NEGATIVE: 1
TROUBLE SWALLOWING: 0

## 2023-08-04 NOTE — PROGRESS NOTES
disorder, Peptic reflux  disease, Dysthymia, Hypothyroidism, Adult health examination    Health Maintenance:  Mammogram - (10/1/2018)  Mammogram Screening - (10/1/2018)      Influenza Vaccination - (9/1/2015)    Medical Problems:  vitamin D def, Asthma  Hypothyroidism - partial thyroidectomy (half of left lobe); benign nodule  Anxiety  GERD - EGD 2011  Seasonal Allergies, Type 2 Diabetes    Surgical Hx:  C/S  Partial Hyste    rectomy - left ovary remains (bleeding issues)  Benita    FH:  Father:  . (Hx)  Mother:  . (Hx)  Mom - thyroid cancer, melanoma  Dad - depression  cousin - melanoma    SH:  . (Marital)Stay at home;  3 kids (8,7,5 as of 2014). No complications during pregn    yadira. Alicia Marcelino ; works at [employer]  Personal Habits: Cigarette Use: Nonsmoker. Alcohol: Denies alcohol use. Social Determinants of Health     Financial Resource Strain: Not on file   Food Insecurity: Not on file   Transportation Needs: Not on file   Physical Activity: Not on file   Stress: Not on file   Social Connections: Not on file   Intimate Partner Violence: Not on file   Housing Stability: Not on file     History reviewed. No pertinent family history. There are no preventive care reminders to display for this patient. There are no preventive care reminders to display for this patient. Diabetes Management   Topic Date Due    Diabetic foot exam  Never done    Diabetic Alb to Cr ratio (uACR) test  01/14/2022    A1C test (Diabetic or Prediabetic)  11/09/2022    Lipids  08/09/2023      There are no preventive care reminders to display for this patient.    Health Maintenance   Topic Date Due    COVID-19 Vaccine (1) Never done    Diabetic foot exam  Never done    HIV screen  Never done    Hepatitis C screen  Never done    Hepatitis B vaccine (3 of 3 - Risk 3-dose series) 11/28/2020    Diabetic Alb to Cr ratio (uACR) test  01/14/2022    A1C test (Diabetic or Prediabetic)  11/09/2022    Flu vaccine (1) 08/01/2023    Depression

## 2023-08-22 ENCOUNTER — OFFICE VISIT (OUTPATIENT)
Dept: FAMILY MEDICINE CLINIC | Age: 48
End: 2023-08-22
Payer: COMMERCIAL

## 2023-08-22 VITALS
OXYGEN SATURATION: 98 % | DIASTOLIC BLOOD PRESSURE: 78 MMHG | SYSTOLIC BLOOD PRESSURE: 112 MMHG | HEART RATE: 101 BPM | TEMPERATURE: 97.7 F | BODY MASS INDEX: 36.07 KG/M2 | WEIGHT: 251.4 LBS

## 2023-08-22 DIAGNOSIS — B37.31 VAGINAL YEAST INFECTION: Primary | ICD-10-CM

## 2023-08-22 DIAGNOSIS — R30.0 DYSURIA: ICD-10-CM

## 2023-08-22 LAB
BILIRUBIN, POC: NORMAL
BLOOD URINE, POC: NORMAL
CLARITY, POC: CLEAR
COLOR, POC: YELLOW
GLUCOSE URINE, POC: NORMAL
KETONES, POC: NORMAL
LEUKOCYTE EST, POC: NORMAL
NITRITE, POC: NORMAL
PH, POC: 5.5
PROTEIN, POC: NORMAL
SPECIFIC GRAVITY, POC: >=1.03
UROBILINOGEN, POC: 0.2

## 2023-08-22 PROCEDURE — 1036F TOBACCO NON-USER: CPT

## 2023-08-22 PROCEDURE — 99213 OFFICE O/P EST LOW 20 MIN: CPT

## 2023-08-22 PROCEDURE — 81002 URINALYSIS NONAUTO W/O SCOPE: CPT

## 2023-08-22 PROCEDURE — G8427 DOCREV CUR MEDS BY ELIG CLIN: HCPCS

## 2023-08-22 PROCEDURE — G8417 CALC BMI ABV UP PARAM F/U: HCPCS

## 2023-08-22 RX ORDER — FLUCONAZOLE 150 MG/1
TABLET ORAL
Qty: 2 TABLET | Refills: 0 | Status: SHIPPED | OUTPATIENT
Start: 2023-08-22

## 2023-08-24 ENCOUNTER — TELEPHONE (OUTPATIENT)
Dept: PRIMARY CARE CLINIC | Age: 48
End: 2023-08-24

## 2023-08-24 DIAGNOSIS — N30.00 ACUTE CYSTITIS WITHOUT HEMATURIA: Primary | ICD-10-CM

## 2023-08-24 RX ORDER — NITROFURANTOIN 25; 75 MG/1; MG/1
100 CAPSULE ORAL 2 TIMES DAILY
Qty: 10 CAPSULE | Refills: 0 | Status: SHIPPED | OUTPATIENT
Start: 2023-08-24 | End: 2023-08-29

## 2023-08-24 NOTE — TELEPHONE ENCOUNTER
Yes I will send in an antibiotic. Preliminary results are back but was waiting for official read. Looks positive for E. Coli.

## 2023-08-24 NOTE — TELEPHONE ENCOUNTER
Patient was seen 8/22 she is having burning with urination, abdominal discomfort, frequency, pressure. Asking if med could be called in. If antibiotic is called in requesting diflucan.

## 2023-08-25 LAB
CULTURE: ABNORMAL
SPECIMEN DESCRIPTION: ABNORMAL

## 2023-09-19 ENCOUNTER — OFFICE VISIT (OUTPATIENT)
Dept: PRIMARY CARE CLINIC | Age: 48
End: 2023-09-19
Payer: COMMERCIAL

## 2023-09-19 VITALS
TEMPERATURE: 98.5 F | HEART RATE: 88 BPM | DIASTOLIC BLOOD PRESSURE: 78 MMHG | OXYGEN SATURATION: 98 % | HEIGHT: 70 IN | SYSTOLIC BLOOD PRESSURE: 128 MMHG | WEIGHT: 251 LBS | BODY MASS INDEX: 35.93 KG/M2

## 2023-09-19 DIAGNOSIS — F32.A DEPRESSION, UNSPECIFIED DEPRESSION TYPE: ICD-10-CM

## 2023-09-19 DIAGNOSIS — Z79.4 TYPE 2 DIABETES MELLITUS WITHOUT COMPLICATION, WITH LONG-TERM CURRENT USE OF INSULIN (HCC): Primary | ICD-10-CM

## 2023-09-19 DIAGNOSIS — Z11.59 ENCOUNTER FOR HEPATITIS C SCREENING TEST FOR LOW RISK PATIENT: ICD-10-CM

## 2023-09-19 DIAGNOSIS — E89.0 POSTOPERATIVE HYPOTHYROIDISM: ICD-10-CM

## 2023-09-19 DIAGNOSIS — Z85.850 HISTORY OF THYROID CANCER: ICD-10-CM

## 2023-09-19 DIAGNOSIS — E04.1 THYROID NODULE: ICD-10-CM

## 2023-09-19 DIAGNOSIS — E55.9 VITAMIN D DEFICIENCY: ICD-10-CM

## 2023-09-19 DIAGNOSIS — Z11.4 ENCOUNTER FOR SCREENING FOR HIV: ICD-10-CM

## 2023-09-19 DIAGNOSIS — E11.9 TYPE 2 DIABETES MELLITUS WITHOUT COMPLICATION, WITH LONG-TERM CURRENT USE OF INSULIN (HCC): Primary | ICD-10-CM

## 2023-09-19 DIAGNOSIS — Z23 ENCOUNTER FOR IMMUNIZATION: ICD-10-CM

## 2023-09-19 PROBLEM — M06.4 INFLAMMATORY POLYARTHROPATHY (HCC): Status: RESOLVED | Noted: 2019-07-25 | Resolved: 2023-09-19

## 2023-09-19 PROCEDURE — 1036F TOBACCO NON-USER: CPT | Performed by: FAMILY MEDICINE

## 2023-09-19 PROCEDURE — G8417 CALC BMI ABV UP PARAM F/U: HCPCS | Performed by: FAMILY MEDICINE

## 2023-09-19 PROCEDURE — G8427 DOCREV CUR MEDS BY ELIG CLIN: HCPCS | Performed by: FAMILY MEDICINE

## 2023-09-19 PROCEDURE — 2022F DILAT RTA XM EVC RTNOPTHY: CPT | Performed by: FAMILY MEDICINE

## 2023-09-19 PROCEDURE — 3046F HEMOGLOBIN A1C LEVEL >9.0%: CPT | Performed by: FAMILY MEDICINE

## 2023-09-19 PROCEDURE — 90471 IMMUNIZATION ADMIN: CPT | Performed by: FAMILY MEDICINE

## 2023-09-19 PROCEDURE — 90677 PCV20 VACCINE IM: CPT | Performed by: FAMILY MEDICINE

## 2023-09-19 PROCEDURE — 99215 OFFICE O/P EST HI 40 MIN: CPT | Performed by: FAMILY MEDICINE

## 2023-09-19 RX ORDER — LEVOTHYROXINE SODIUM 0.07 MG/1
75 TABLET ORAL DAILY
Qty: 90 TABLET | Refills: 3 | Status: SHIPPED | OUTPATIENT
Start: 2023-09-19

## 2023-09-19 RX ORDER — INSULIN ASPART INJECTION 100 [IU]/ML
INJECTION, SOLUTION SUBCUTANEOUS
Qty: 6 ADJUSTABLE DOSE PRE-FILLED PEN SYRINGE | Refills: 3 | Status: SHIPPED | OUTPATIENT
Start: 2023-09-19

## 2023-09-19 RX ORDER — GLIPIZIDE 10 MG/1
10 TABLET, FILM COATED, EXTENDED RELEASE ORAL 2 TIMES DAILY
Qty: 180 TABLET | Refills: 3 | Status: SHIPPED | OUTPATIENT
Start: 2023-09-19

## 2023-09-19 RX ORDER — INSULIN DEGLUDEC INJECTION 100 U/ML
30 INJECTION, SOLUTION SUBCUTANEOUS DAILY
Qty: 6 ADJUSTABLE DOSE PRE-FILLED PEN SYRINGE | Refills: 3 | Status: SHIPPED | OUTPATIENT
Start: 2023-09-19

## 2023-09-19 RX ORDER — METFORMIN HYDROCHLORIDE 500 MG/1
1000 TABLET, EXTENDED RELEASE ORAL 2 TIMES DAILY
Qty: 360 TABLET | Refills: 3 | Status: SHIPPED | OUTPATIENT
Start: 2023-09-19

## 2023-09-19 SDOH — ECONOMIC STABILITY: HOUSING INSECURITY
IN THE LAST 12 MONTHS, WAS THERE A TIME WHEN YOU DID NOT HAVE A STEADY PLACE TO SLEEP OR SLEPT IN A SHELTER (INCLUDING NOW)?: NO

## 2023-09-19 SDOH — ECONOMIC STABILITY: FOOD INSECURITY: WITHIN THE PAST 12 MONTHS, THE FOOD YOU BOUGHT JUST DIDN'T LAST AND YOU DIDN'T HAVE MONEY TO GET MORE.: NEVER TRUE

## 2023-09-19 SDOH — ECONOMIC STABILITY: INCOME INSECURITY: HOW HARD IS IT FOR YOU TO PAY FOR THE VERY BASICS LIKE FOOD, HOUSING, MEDICAL CARE, AND HEATING?: NOT HARD AT ALL

## 2023-09-19 SDOH — ECONOMIC STABILITY: FOOD INSECURITY: WITHIN THE PAST 12 MONTHS, YOU WORRIED THAT YOUR FOOD WOULD RUN OUT BEFORE YOU GOT MONEY TO BUY MORE.: NEVER TRUE

## 2023-09-19 ASSESSMENT — PATIENT HEALTH QUESTIONNAIRE - PHQ9
10. IF YOU CHECKED OFF ANY PROBLEMS, HOW DIFFICULT HAVE THESE PROBLEMS MADE IT FOR YOU TO DO YOUR WORK, TAKE CARE OF THINGS AT HOME, OR GET ALONG WITH OTHER PEOPLE: 2
7. TROUBLE CONCENTRATING ON THINGS, SUCH AS READING THE NEWSPAPER OR WATCHING TELEVISION: 0
2. FEELING DOWN, DEPRESSED OR HOPELESS: 2
SUM OF ALL RESPONSES TO PHQ QUESTIONS 1-9: 9
9. THOUGHTS THAT YOU WOULD BE BETTER OFF DEAD, OR OF HURTING YOURSELF: 2
4. FEELING TIRED OR HAVING LITTLE ENERGY: 2
SUM OF ALL RESPONSES TO PHQ9 QUESTIONS 1 & 2: 2
3. TROUBLE FALLING OR STAYING ASLEEP: 1
1. LITTLE INTEREST OR PLEASURE IN DOING THINGS: 0
8. MOVING OR SPEAKING SO SLOWLY THAT OTHER PEOPLE COULD HAVE NOTICED. OR THE OPPOSITE, BEING SO FIGETY OR RESTLESS THAT YOU HAVE BEEN MOVING AROUND A LOT MORE THAN USUAL: 0
SUM OF ALL RESPONSES TO PHQ QUESTIONS 1-9: 9
6. FEELING BAD ABOUT YOURSELF - OR THAT YOU ARE A FAILURE OR HAVE LET YOURSELF OR YOUR FAMILY DOWN: 2
SUM OF ALL RESPONSES TO PHQ QUESTIONS 1-9: 9
5. POOR APPETITE OR OVEREATING: 0
SUM OF ALL RESPONSES TO PHQ QUESTIONS 1-9: 7

## 2023-09-19 ASSESSMENT — COLUMBIA-SUICIDE SEVERITY RATING SCALE - C-SSRS
2. HAVE YOU ACTUALLY HAD ANY THOUGHTS OF KILLING YOURSELF?: YES
6. HAVE YOU EVER DONE ANYTHING, STARTED TO DO ANYTHING, OR PREPARED TO DO ANYTHING TO END YOUR LIFE?: YES
4. HAVE YOU HAD THESE THOUGHTS AND HAD SOME INTENTION OF ACTING ON THEM?: YES
3. HAVE YOU BEEN THINKING ABOUT HOW YOU MIGHT KILL YOURSELF?: YES
1. WITHIN THE PAST MONTH, HAVE YOU WISHED YOU WERE DEAD OR WISHED YOU COULD GO TO SLEEP AND NOT WAKE UP?: YES
7. DID THIS OCCUR IN THE LAST THREE MONTHS: YES
5. HAVE YOU STARTED TO WORK OUT OR WORKED OUT THE DETAILS OF HOW TO KILL YOURSELF? DO YOU INTEND TO CARRY OUT THIS PLAN?: YES

## 2023-09-19 NOTE — PROGRESS NOTES
Halle Thorpe : 1975 Sex: female  Age: 56 y.o. Chief Complaint   Patient presents with    Discuss Medications    Abdominal Pain     Pt was seen in hospital for abdominal pain    Depression       HPI  HPI:       Patient presents today, last seen by me . Lost her insurance but now has insurance back. Had not been following with endocrinology/Dr Justice but was able to get her insulin and use some of her friends insulin as well. Needs refills on all her medication. Was also actively following with [facility] psychiatry, had noticeable benefit on the Trintellix 10 mg daily, no longer seeing the counselor but is going to begin seeing a counselor through her [employer] who is licensed. Chronic depression. Chronic thoughts of what would be like if she was not here, admits last week she had had a plan where she was going to take the pain medicine that the ER gave her although the pharmacy did not fill and she promises that this has passed,  aware, says her kids need her and adamantly denies SI/HI at this time. Declines hospitalization. Convincingly denies SI/HI now. Very open and how she talks. A lot of things built up, but overall doing better at this time. Insurance changed and does not wish to follow [facility] does not think that they are on her insurance. Had abdominal pain last week with to the ER negative work-up including CT she states. The symptom resolved. Last labs reviewed      Review of Systems  ROS:  Const: Denies chills, fever, malaise and sweats. Eyes: Denies discharge, pain, redness and visual disturbance. ENMT: Denies earaches, other ear symptoms. Denies nasal or sinus symptoms other than stated  above. Denies mouth and tongue lesions and sore throat.   CV: Denies chest discomfort, pain; diaphoresis, dizziness, edema, lightheadedness, orthopnea,  palpitations, syncope and near syncopal episode or any exertional symptoms  Resp: Denies cough, hemoptysis, pleuritic pain,

## 2023-09-22 ENCOUNTER — TELEPHONE (OUTPATIENT)
Dept: PRIMARY CARE CLINIC | Age: 48
End: 2023-09-22

## 2023-09-22 NOTE — TELEPHONE ENCOUNTER
Patient was seen on 9/19 and received pneumonia injection. Had a red, painful lump the day of and the following 2 days. Lump has disappeared, but area is still very red and still very painful to the point that even her shirt rubbing on the area is causing pain.

## 2023-09-22 NOTE — TELEPHONE ENCOUNTER
Could be local reaction for which hydrocortisone could help.  But always best to be seen to be assessed for other issues incl cellulitis

## 2023-10-14 ENCOUNTER — HOSPITAL ENCOUNTER (OUTPATIENT)
Age: 48
Discharge: HOME | End: 2023-10-14
Payer: COMMERCIAL

## 2023-10-14 DIAGNOSIS — E89.0 POSTOPERATIVE HYPOTHYROIDISM: ICD-10-CM

## 2023-10-14 DIAGNOSIS — Z11.4 ENCOUNTER FOR SCREENING FOR HIV: ICD-10-CM

## 2023-10-14 DIAGNOSIS — E11.9 TYPE 2 DIABETES MELLITUS WITHOUT COMPLICATION, WITH LONG-TERM CURRENT USE OF INSULIN (HCC): ICD-10-CM

## 2023-10-14 DIAGNOSIS — Z79.4 TYPE 2 DIABETES MELLITUS WITHOUT COMPLICATION, WITH LONG-TERM CURRENT USE OF INSULIN (HCC): ICD-10-CM

## 2023-10-14 DIAGNOSIS — Z11.59 ENCOUNTER FOR HEPATITIS C SCREENING TEST FOR LOW RISK PATIENT: ICD-10-CM

## 2023-10-14 DIAGNOSIS — E55.9 VITAMIN D DEFICIENCY: ICD-10-CM

## 2023-10-14 LAB
25(OH)D3 SERPL-MCNC: 20.9 NG/ML (ref 30–100)
ALBUMIN SERPL-MCNC: 3.9 G/DL (ref 3.5–5.2)
ALP SERPL-CCNC: 100 U/L (ref 35–104)
ALT SERPL-CCNC: 15 U/L (ref 0–32)
ANION GAP SERPL CALCULATED.3IONS-SCNC: 11 MMOL/L (ref 7–16)
AST SERPL-CCNC: 12 U/L (ref 0–31)
BACTERIA URNS QL MICRO: ABNORMAL
BASOPHILS # BLD: 0.05 K/UL (ref 0–0.2)
BASOPHILS NFR BLD: 1 % (ref 0–2)
BILIRUB SERPL-MCNC: 0.8 MG/DL (ref 0–1.2)
BILIRUB UR QL STRIP: NEGATIVE
BUN SERPL-MCNC: 11 MG/DL (ref 6–20)
CALCIUM SERPL-MCNC: 8.9 MG/DL (ref 8.6–10.2)
CHLORIDE SERPL-SCNC: 103 MMOL/L (ref 98–107)
CHOLEST SERPL-MCNC: 217 MG/DL
CLARITY UR: ABNORMAL
CO2 SERPL-SCNC: 21 MMOL/L (ref 22–29)
COLOR UR: YELLOW
CREAT SERPL-MCNC: 0.6 MG/DL (ref 0.5–1)
CREAT UR-MCNC: 136 MG/DL (ref 29–226)
EOSINOPHIL # BLD: 0.07 K/UL (ref 0.05–0.5)
EOSINOPHILS RELATIVE PERCENT: 1 % (ref 0–6)
EPI CELLS #/AREA URNS HPF: ABNORMAL /HPF
ERYTHROCYTE [DISTWIDTH] IN BLOOD BY AUTOMATED COUNT: 16.4 % (ref 11.5–15)
GFR SERPL CREATININE-BSD FRML MDRD: >60 ML/MIN/1.73M2
GLUCOSE SERPL-MCNC: 243 MG/DL (ref 74–99)
GLUCOSE UR STRIP-MCNC: >=1000 MG/DL
HCT VFR BLD AUTO: 42.6 % (ref 34–48)
HDLC SERPL-MCNC: 67 MG/DL
HGB BLD-MCNC: 13.1 G/DL (ref 11.5–15.5)
HGB UR QL STRIP.AUTO: NEGATIVE
IMM GRANULOCYTES # BLD AUTO: <0.03 K/UL (ref 0–0.58)
IMM GRANULOCYTES NFR BLD: 0 % (ref 0–5)
KETONES UR STRIP-MCNC: NEGATIVE MG/DL
LDLC SERPL CALC-MCNC: 128 MG/DL
LEUKOCYTE ESTERASE UR QL STRIP: NEGATIVE
LYMPHOCYTES NFR BLD: 2.19 K/UL (ref 1.5–4)
LYMPHOCYTES RELATIVE PERCENT: 34 % (ref 20–42)
MCH RBC QN AUTO: 23.5 PG (ref 26–35)
MCHC RBC AUTO-ENTMCNC: 30.8 G/DL (ref 32–34.5)
MCV RBC AUTO: 76.5 FL (ref 80–99.9)
MICROALBUMIN UR-MCNC: 23 MG/L (ref 0–19)
MICROALBUMIN/CREAT UR-RTO: 17 MCG/MG CREAT (ref 0–30)
MONOCYTES NFR BLD: 0.55 K/UL (ref 0.1–0.95)
MONOCYTES NFR BLD: 9 % (ref 2–12)
NEUTROPHILS NFR BLD: 55 % (ref 43–80)
NEUTS SEG NFR BLD: 3.53 K/UL (ref 1.8–7.3)
NITRITE UR QL STRIP: NEGATIVE
PH UR STRIP: 5.5 [PH] (ref 5–9)
PLATELET # BLD AUTO: 350 K/UL (ref 130–450)
PMV BLD AUTO: 9 FL (ref 7–12)
POTASSIUM SERPL-SCNC: 4 MMOL/L (ref 3.5–5)
PROT SERPL-MCNC: 7.5 G/DL (ref 6.4–8.3)
PROT UR STRIP-MCNC: NEGATIVE MG/DL
RBC # BLD AUTO: 5.57 M/UL (ref 3.5–5.5)
RBC #/AREA URNS HPF: ABNORMAL /HPF
SODIUM SERPL-SCNC: 135 MMOL/L (ref 132–146)
SP GR UR STRIP: >1.03 (ref 1–1.03)
T4 FREE SERPL-MCNC: 1.1 NG/DL (ref 0.9–1.7)
TRIGL SERPL-MCNC: 109 MG/DL
TSH SERPL DL<=0.05 MIU/L-ACNC: 1.03 UIU/ML (ref 0.27–4.2)
UROBILINOGEN UR STRIP-ACNC: 0.2 EU/DL (ref 0–1)
VLDLC SERPL CALC-MCNC: 22 MG/DL
WBC #/AREA URNS HPF: ABNORMAL /HPF
WBC OTHER # BLD: 6.4 K/UL (ref 4.5–11.5)

## 2023-10-14 PROCEDURE — 87389 HIV-1 AG W/HIV-1&-2 AB AG IA: CPT

## 2023-10-14 PROCEDURE — 82306 VITAMIN D 25 HYDROXY: CPT

## 2023-10-14 PROCEDURE — 36415 COLL VENOUS BLD VENIPUNCTURE: CPT

## 2023-10-14 PROCEDURE — 80053 COMPREHEN METABOLIC PANEL: CPT

## 2023-10-14 PROCEDURE — 80061 LIPID PANEL: CPT

## 2023-10-14 PROCEDURE — 84443 ASSAY THYROID STIM HORMONE: CPT

## 2023-10-14 PROCEDURE — 85025 COMPLETE CBC W/AUTO DIFF WBC: CPT

## 2023-10-14 PROCEDURE — 82570 ASSAY OF URINE CREATININE: CPT

## 2023-10-14 PROCEDURE — 81001 URINALYSIS AUTO W/SCOPE: CPT

## 2023-10-14 PROCEDURE — 82043 UR ALBUMIN QUANTITATIVE: CPT

## 2023-10-14 PROCEDURE — 86803 HEPATITIS C AB TEST: CPT

## 2023-10-14 PROCEDURE — 84439 ASSAY OF FREE THYROXINE: CPT

## 2023-10-16 LAB
HCV AB SERPL QL IA: NONREACTIVE
HIV 1+2 AB+HIV1 P24 AG SERPL QL IA: NONREACTIVE

## 2023-10-17 ENCOUNTER — HOSPITAL ENCOUNTER (OUTPATIENT)
Dept: ULTRASOUND IMAGING | Age: 48
Discharge: HOME | End: 2023-10-19
Attending: FAMILY MEDICINE
Payer: COMMERCIAL

## 2023-10-17 DIAGNOSIS — E04.1 THYROID NODULE: ICD-10-CM

## 2023-10-17 DIAGNOSIS — Z85.850 HISTORY OF THYROID CANCER: ICD-10-CM

## 2023-10-17 DIAGNOSIS — E89.0 POSTOPERATIVE HYPOTHYROIDISM: ICD-10-CM

## 2023-10-17 PROCEDURE — 76536 US EXAM OF HEAD AND NECK: CPT

## 2023-10-23 ENCOUNTER — OFFICE VISIT (OUTPATIENT)
Dept: PRIMARY CARE CLINIC | Age: 48
End: 2023-10-23
Payer: COMMERCIAL

## 2023-10-23 VITALS
TEMPERATURE: 97.9 F | SYSTOLIC BLOOD PRESSURE: 126 MMHG | OXYGEN SATURATION: 98 % | HEART RATE: 91 BPM | WEIGHT: 253 LBS | DIASTOLIC BLOOD PRESSURE: 80 MMHG | BODY MASS INDEX: 36.22 KG/M2 | HEIGHT: 70 IN

## 2023-10-23 DIAGNOSIS — H53.2 DIPLOPIA: ICD-10-CM

## 2023-10-23 DIAGNOSIS — E04.1 THYROID NODULE: ICD-10-CM

## 2023-10-23 DIAGNOSIS — E55.9 VITAMIN D DEFICIENCY: ICD-10-CM

## 2023-10-23 DIAGNOSIS — Z79.4 TYPE 2 DIABETES MELLITUS WITHOUT COMPLICATION, WITH LONG-TERM CURRENT USE OF INSULIN (HCC): Primary | ICD-10-CM

## 2023-10-23 DIAGNOSIS — E78.2 MIXED HYPERLIPIDEMIA: ICD-10-CM

## 2023-10-23 DIAGNOSIS — Z85.850 HISTORY OF THYROID CANCER: ICD-10-CM

## 2023-10-23 DIAGNOSIS — R71.8 MICROCYTOSIS: ICD-10-CM

## 2023-10-23 DIAGNOSIS — H53.131: ICD-10-CM

## 2023-10-23 DIAGNOSIS — R31.9 HEMATURIA, UNSPECIFIED TYPE: ICD-10-CM

## 2023-10-23 DIAGNOSIS — E11.9 TYPE 2 DIABETES MELLITUS WITHOUT COMPLICATION, WITH LONG-TERM CURRENT USE OF INSULIN (HCC): Primary | ICD-10-CM

## 2023-10-23 DIAGNOSIS — E89.0 POSTOPERATIVE HYPOTHYROIDISM: ICD-10-CM

## 2023-10-23 LAB
BILIRUBIN, POC: NEGATIVE
BLOOD URINE, POC: NEGATIVE
CLARITY, POC: CLEAR
COLOR, POC: YELLOW
GLUCOSE URINE, POC: 500
KETONES, POC: NEGATIVE
LEUKOCYTE EST, POC: NEGATIVE
NITRITE, POC: NEGATIVE
PH, POC: 7
PROTEIN, POC: NEGATIVE
SPECIFIC GRAVITY, POC: 1.01
UROBILINOGEN, POC: 1

## 2023-10-23 PROCEDURE — 1036F TOBACCO NON-USER: CPT | Performed by: FAMILY MEDICINE

## 2023-10-23 PROCEDURE — G8427 DOCREV CUR MEDS BY ELIG CLIN: HCPCS | Performed by: FAMILY MEDICINE

## 2023-10-23 PROCEDURE — G8417 CALC BMI ABV UP PARAM F/U: HCPCS | Performed by: FAMILY MEDICINE

## 2023-10-23 PROCEDURE — 99215 OFFICE O/P EST HI 40 MIN: CPT | Performed by: FAMILY MEDICINE

## 2023-10-23 PROCEDURE — 2022F DILAT RTA XM EVC RTNOPTHY: CPT | Performed by: FAMILY MEDICINE

## 2023-10-23 PROCEDURE — 81002 URINALYSIS NONAUTO W/O SCOPE: CPT | Performed by: FAMILY MEDICINE

## 2023-10-23 PROCEDURE — G8484 FLU IMMUNIZE NO ADMIN: HCPCS | Performed by: FAMILY MEDICINE

## 2023-10-23 PROCEDURE — 3046F HEMOGLOBIN A1C LEVEL >9.0%: CPT | Performed by: FAMILY MEDICINE

## 2023-10-23 RX ORDER — ROSUVASTATIN CALCIUM 10 MG/1
10 TABLET, COATED ORAL
Qty: 30 TABLET | Refills: 1 | Status: SHIPPED | OUTPATIENT
Start: 2023-10-23

## 2023-10-23 RX ORDER — ERGOCALCIFEROL 1.25 MG/1
50000 CAPSULE ORAL WEEKLY
Qty: 12 CAPSULE | Refills: 1 | Status: SHIPPED | OUTPATIENT
Start: 2023-10-23

## 2023-10-23 NOTE — PROGRESS NOTES
Notify if out of range. Sees Dr Meng Jurado January. Start Crestor. Continue per serenity. Refer ophthalmology. Start vitamin D. Blood work 1 month follow-up 5 weeks sooner as needed. Notify if desires further E/T for me. As long as symptoms steadily improve/resolve, and medical conditions follow the expected course, FU as below, sooner PRN. Return in about 5 weeks (around 11/27/2023), or if symptoms worsen or fail to improve. Educational materials and/or home exercises printed for patient's review and were included in patient instructions on his/her After Visit Summary and given to patient at the end of visit. After discussion, patient and/or guardian verbalizes understanding, agrees, feels comfortable with and wishes to proceed with above treatment plan. Call for any pending results, FU sooner if abnormal, as needed or if any current symptoms persist/worsen. Advised patient to call with any new medication issues, and read all Rx info from pharmacy to assure aware of all possible risks and side effects of medication before taking. Reviewed age and gender appropriate health screening exams and vaccinations. Advised patient regarding importance of keeping up with recommended health maintenance and to schedule as soon as possible if overdue, as this is important in assessing for undiagnosed pathology, especially cancer, as well as protecting against potentially harmful/life threatening disease. Patient and/or guardian verbalizes understanding and agrees with above counseling, assessment and plan. All questions answered. Over 40 minutes spent reviewing records and preparing to see the patient, obtaining history, performing exam, counseling/educating the patient/family/caregiver, ordering medications and/or tests, referring/care coordination if applicable and documenting clinical information in the electronic health record.       Signs and symptoms to watch for

## 2023-11-28 ENCOUNTER — OFFICE VISIT (OUTPATIENT)
Dept: FAMILY MEDICINE CLINIC | Age: 48
End: 2023-11-28
Payer: COMMERCIAL

## 2023-11-28 VITALS
SYSTOLIC BLOOD PRESSURE: 136 MMHG | DIASTOLIC BLOOD PRESSURE: 84 MMHG | OXYGEN SATURATION: 99 % | BODY MASS INDEX: 35.79 KG/M2 | WEIGHT: 250 LBS | HEIGHT: 70 IN | HEART RATE: 100 BPM | TEMPERATURE: 97.5 F

## 2023-11-28 DIAGNOSIS — N30.00 ACUTE CYSTITIS WITHOUT HEMATURIA: Primary | ICD-10-CM

## 2023-11-28 DIAGNOSIS — T36.95XA ANTIBIOTIC-INDUCED YEAST INFECTION: ICD-10-CM

## 2023-11-28 DIAGNOSIS — R30.0 DYSURIA: ICD-10-CM

## 2023-11-28 DIAGNOSIS — B37.9 ANTIBIOTIC-INDUCED YEAST INFECTION: ICD-10-CM

## 2023-11-28 LAB
BILIRUBIN, POC: NEGATIVE
BLOOD URINE, POC: NEGATIVE
CLARITY, POC: NORMAL
COLOR, POC: NORMAL
GLUCOSE URINE, POC: NORMAL
KETONES, POC: NORMAL
LEUKOCYTE EST, POC: NEGATIVE
NITRITE, POC: POSITIVE
PH, POC: 5.5
PROTEIN, POC: NEGATIVE
SPECIFIC GRAVITY, POC: >=1.03
UROBILINOGEN, POC: 1

## 2023-11-28 PROCEDURE — 81002 URINALYSIS NONAUTO W/O SCOPE: CPT

## 2023-11-28 PROCEDURE — G8417 CALC BMI ABV UP PARAM F/U: HCPCS

## 2023-11-28 PROCEDURE — 99213 OFFICE O/P EST LOW 20 MIN: CPT

## 2023-11-28 PROCEDURE — G8427 DOCREV CUR MEDS BY ELIG CLIN: HCPCS

## 2023-11-28 PROCEDURE — G8484 FLU IMMUNIZE NO ADMIN: HCPCS

## 2023-11-28 PROCEDURE — 1036F TOBACCO NON-USER: CPT

## 2023-11-28 RX ORDER — PHENAZOPYRIDINE HYDROCHLORIDE 100 MG/1
100 TABLET, FILM COATED ORAL 3 TIMES DAILY PRN
Qty: 6 TABLET | Refills: 0 | Status: SHIPPED | OUTPATIENT
Start: 2023-11-28 | End: 2023-11-30

## 2023-11-28 RX ORDER — NITROFURANTOIN 25; 75 MG/1; MG/1
100 CAPSULE ORAL 2 TIMES DAILY
Qty: 10 CAPSULE | Refills: 0 | Status: SHIPPED | OUTPATIENT
Start: 2023-11-28 | End: 2023-12-03

## 2023-11-28 RX ORDER — FLUCONAZOLE 150 MG/1
TABLET ORAL
Qty: 2 TABLET | Refills: 0 | Status: SHIPPED | OUTPATIENT
Start: 2023-11-28

## 2023-12-01 LAB
CULTURE: ABNORMAL
SPECIMEN DESCRIPTION: ABNORMAL

## 2024-01-16 ENCOUNTER — OFFICE VISIT (OUTPATIENT)
Dept: ENDOCRINOLOGY | Age: 49
End: 2024-01-16
Payer: COMMERCIAL

## 2024-01-16 VITALS
WEIGHT: 248 LBS | SYSTOLIC BLOOD PRESSURE: 112 MMHG | HEIGHT: 70 IN | BODY MASS INDEX: 35.5 KG/M2 | OXYGEN SATURATION: 98 % | HEART RATE: 100 BPM | DIASTOLIC BLOOD PRESSURE: 79 MMHG

## 2024-01-16 DIAGNOSIS — E03.9 HYPOTHYROIDISM, UNSPECIFIED TYPE: ICD-10-CM

## 2024-01-16 DIAGNOSIS — E04.1 THYROID NODULE: ICD-10-CM

## 2024-01-16 DIAGNOSIS — E11.9 INSULIN DEPENDENT TYPE 2 DIABETES MELLITUS (HCC): ICD-10-CM

## 2024-01-16 DIAGNOSIS — Z79.4 INSULIN DEPENDENT TYPE 2 DIABETES MELLITUS (HCC): ICD-10-CM

## 2024-01-16 DIAGNOSIS — E11.65 TYPE 2 DIABETES MELLITUS WITH HYPERGLYCEMIA, WITHOUT LONG-TERM CURRENT USE OF INSULIN (HCC): Primary | ICD-10-CM

## 2024-01-16 LAB — HBA1C MFR BLD: 10.9 %

## 2024-01-16 PROCEDURE — G8417 CALC BMI ABV UP PARAM F/U: HCPCS | Performed by: INTERNAL MEDICINE

## 2024-01-16 PROCEDURE — 1036F TOBACCO NON-USER: CPT | Performed by: INTERNAL MEDICINE

## 2024-01-16 PROCEDURE — 2022F DILAT RTA XM EVC RTNOPTHY: CPT | Performed by: INTERNAL MEDICINE

## 2024-01-16 PROCEDURE — G8427 DOCREV CUR MEDS BY ELIG CLIN: HCPCS | Performed by: INTERNAL MEDICINE

## 2024-01-16 PROCEDURE — 3046F HEMOGLOBIN A1C LEVEL >9.0%: CPT | Performed by: INTERNAL MEDICINE

## 2024-01-16 PROCEDURE — G8484 FLU IMMUNIZE NO ADMIN: HCPCS | Performed by: INTERNAL MEDICINE

## 2024-01-16 PROCEDURE — 83036 HEMOGLOBIN GLYCOSYLATED A1C: CPT | Performed by: INTERNAL MEDICINE

## 2024-01-16 PROCEDURE — 99214 OFFICE O/P EST MOD 30 MIN: CPT | Performed by: INTERNAL MEDICINE

## 2024-01-16 RX ORDER — INSULIN PMP CART,AUT,G6/7,CNTR
1 EACH SUBCUTANEOUS ONCE
Qty: 1 KIT | Refills: 0 | Status: SHIPPED | OUTPATIENT
Start: 2024-01-16 | End: 2024-01-16

## 2024-01-16 RX ORDER — INSULIN PMP CART,AUT,G6/7,CNTR
EACH SUBCUTANEOUS
Qty: 10 EACH | Refills: 11 | Status: SHIPPED | OUTPATIENT
Start: 2024-01-16

## 2024-01-16 RX ORDER — PROCHLORPERAZINE 25 MG/1
SUPPOSITORY RECTAL
Qty: 9 EACH | Refills: 3 | Status: SHIPPED | OUTPATIENT
Start: 2024-01-16

## 2024-01-16 RX ORDER — PROCHLORPERAZINE 25 MG/1
SUPPOSITORY RECTAL
Qty: 1 EACH | Refills: 3 | Status: SHIPPED | OUTPATIENT
Start: 2024-01-16

## 2024-01-16 NOTE — PATIENT INSTRUCTIONS
Recommendations for today's visit  Take Metformin 500 mg 2 tablet twice daily  Take Glipizide 10 mg twice a day   Change Tresiba 34 units nightly   Take Fiasp 10 units with  meals plus the following sliding scale   Blood sugar 150-200: add 2 Units of Humalog  Blood sugar 201-250 : Add 4 Units ofHumalog  Blood Sugar 251 - 300: Add 6 Units of Humalog  Blood Sugar 301-350: Add 8  Units of Humalog  Blood Sugar 350-400: Add 10 Units of Humalog  Blood sugar  >400: Add 12 Units of Humalog     Check Blood sugar 4 times/day before meals and at bedtime and send us sugar log in a week      These are your blood sugar, blood pressure, cholesterol and A1c goals:  Blood sugar fastin mg/dl to 130 mg/dl  Blood sugar before meals: <150 mg/dl  Peak blood sugar lower than 180 mg/dl  A1c: between 6.5 - 7.5%    I you have any questions please call Dr. Justice's office     Chan Justice MD  Endocrinologist, [address] [address]   Phone: [phone]  Fax: [phone]  Email: [email or web address]

## 2024-01-16 NOTE — PROGRESS NOTES
[facility] Department of Endocrinology Diabetes and Metabolism   [address] [place] [id number]   Phone: [phone]  Fax: [phone]    Date of Service: 1/16/2024  Primary Care Physician: Thierno Vargas MD  Provider: Chan Justice MD     Reason for the visit:  DM type 2, Hypothyroidism, MNG     History of Present Illness:  The history is provided by the patient. No  was used. Accuracy of the patient data is excellent.  Elbert Olivares is a very pleasant 48 y.o. female seen today for diabetes management     Elbert Olivares was diagnosed with diabetes at age 39 and currently on Metformin er 500 mg 2 tab bid, Tresiba 30 U nightly, Fiasp 10 units with meals + ss 1:50>150, Glipizide er 10 mg BID   The patient admits to poor compliance with checking glucose.  She also was not following diabetic diet  Most recent A1c results summarized below  Lab Results   Component Value Date/Time    LABA1C 10.9 01/16/2024 08:46 AM    LABA1C 10.4 08/09/2022 07:58 AM    LABA1C 8.7 10/11/2021 02:08 PM     Patient has had no hypoglycemic episodes   The patient has been mindful of what has been eating and was following diabetes diet as encouraged   I reviewed current medications and the patient has no issues with diabetes medications  Elbert Olivares is up to date with eye exam and denied any history of diabetic retinopathy   The patient performs  own feet care  Microvascular complications:  No Retinopathy, Nephropathy or Neuropathy   Macrovascular complications: no CAD, PVD, or Stroke  The patient refuses Flushot and pneumonia vaccine     Hypothyroidism   H/o partial thyroidectomy long time ago for MNG. Per pt pathology was benign   She is currently on Levothyhroxine 75 mcg dailty. Patient takes levothyroxine in the morning at empty stomach, wait one hour before eating , avoid multivitamins containing calcium  or iron with it.  Lab Results   Component Value Date/Time    TSH 1.03

## 2024-01-29 ENCOUNTER — TELEPHONE (OUTPATIENT)
Dept: ENDOCRINOLOGY | Age: 49
End: 2024-01-29

## 2024-02-05 DIAGNOSIS — E11.65 TYPE 2 DIABETES MELLITUS WITH HYPERGLYCEMIA, WITHOUT LONG-TERM CURRENT USE OF INSULIN (HCC): Primary | ICD-10-CM

## 2024-02-06 ENCOUNTER — OFFICE VISIT (OUTPATIENT)
Dept: FAMILY MEDICINE CLINIC | Age: 49
End: 2024-02-06
Payer: COMMERCIAL

## 2024-02-06 ENCOUNTER — APPOINTMENT (OUTPATIENT)
Dept: CT IMAGING | Age: 49
End: 2024-02-06
Payer: COMMERCIAL

## 2024-02-06 ENCOUNTER — HOSPITAL ENCOUNTER (EMERGENCY)
Age: 49
Discharge: HOME | End: 2024-02-06
Attending: EMERGENCY MEDICINE
Payer: COMMERCIAL

## 2024-02-06 VITALS
RESPIRATION RATE: 18 BRPM | BODY MASS INDEX: 36.94 KG/M2 | SYSTOLIC BLOOD PRESSURE: 143 MMHG | WEIGHT: 258 LBS | HEART RATE: 90 BPM | TEMPERATURE: 98.6 F | OXYGEN SATURATION: 99 % | HEIGHT: 70 IN | DIASTOLIC BLOOD PRESSURE: 86 MMHG

## 2024-02-06 VITALS
SYSTOLIC BLOOD PRESSURE: 136 MMHG | OXYGEN SATURATION: 98 % | HEIGHT: 70 IN | TEMPERATURE: 97.6 F | HEART RATE: 97 BPM | DIASTOLIC BLOOD PRESSURE: 86 MMHG | WEIGHT: 258 LBS | BODY MASS INDEX: 36.94 KG/M2

## 2024-02-06 DIAGNOSIS — R11.2 NAUSEA AND VOMITING, UNSPECIFIED VOMITING TYPE: Primary | ICD-10-CM

## 2024-02-06 DIAGNOSIS — R53.1 WEAKNESS: ICD-10-CM

## 2024-02-06 DIAGNOSIS — K59.00 CONSTIPATION, UNSPECIFIED CONSTIPATION TYPE: ICD-10-CM

## 2024-02-06 LAB
ALBUMIN SERPL-MCNC: 4.1 G/DL (ref 3.5–5.2)
ALP SERPL-CCNC: 106 U/L (ref 35–104)
ALT SERPL-CCNC: 24 U/L (ref 0–32)
AMPHET UR QL SCN: NEGATIVE
ANION GAP SERPL CALCULATED.3IONS-SCNC: 16 MMOL/L (ref 7–16)
AST SERPL-CCNC: 59 U/L (ref 0–31)
BACTERIA URNS QL MICRO: ABNORMAL
BARBITURATES UR QL SCN: NEGATIVE
BASOPHILS # BLD: 0.04 K/UL (ref 0–0.2)
BASOPHILS NFR BLD: 0 % (ref 0–2)
BENZODIAZ UR QL: NEGATIVE
BILIRUB SERPL-MCNC: 0.9 MG/DL (ref 0–1.2)
BILIRUB UR QL STRIP: NEGATIVE
BUN SERPL-MCNC: 15 MG/DL (ref 6–20)
BUPRENORPHINE UR QL: NEGATIVE
CALCIUM SERPL-MCNC: 9.2 MG/DL (ref 8.6–10.2)
CANNABINOIDS UR QL SCN: NEGATIVE
CHLORIDE SERPL-SCNC: 104 MMOL/L (ref 98–107)
CLARITY UR: ABNORMAL
CO2 SERPL-SCNC: 19 MMOL/L (ref 22–29)
COCAINE UR QL SCN: NEGATIVE
COLOR UR: YELLOW
CREAT SERPL-MCNC: 0.6 MG/DL (ref 0.5–1)
EOSINOPHIL # BLD: 0.07 K/UL (ref 0.05–0.5)
EOSINOPHILS RELATIVE PERCENT: 1 % (ref 0–6)
EPI CELLS #/AREA URNS HPF: ABNORMAL /HPF
ERYTHROCYTE [DISTWIDTH] IN BLOOD BY AUTOMATED COUNT: 17 % (ref 11.5–15)
FENTANYL UR QL: NEGATIVE
GFR SERPL CREATININE-BSD FRML MDRD: >60 ML/MIN/1.73M2
GLUCOSE SERPL-MCNC: 193 MG/DL (ref 74–99)
GLUCOSE UR STRIP-MCNC: 100 MG/DL
HCG, URINE, POC: NEGATIVE
HCT VFR BLD AUTO: 43.3 % (ref 34–48)
HGB BLD-MCNC: 13.7 G/DL (ref 11.5–15.5)
HGB UR QL STRIP.AUTO: NEGATIVE
IMM GRANULOCYTES # BLD AUTO: 0.04 K/UL (ref 0–0.58)
IMM GRANULOCYTES NFR BLD: 0 % (ref 0–5)
INFLUENZA A BY PCR: NOT DETECTED
INFLUENZA B BY PCR: NOT DETECTED
KETONES UR STRIP-MCNC: 40 MG/DL
LACTATE BLDV-SCNC: 1.5 MMOL/L (ref 0.5–2.2)
LEUKOCYTE ESTERASE UR QL STRIP: NEGATIVE
LIPASE SERPL-CCNC: 70 U/L (ref 13–60)
LYMPHOCYTES NFR BLD: 0.97 K/UL (ref 1.5–4)
LYMPHOCYTES RELATIVE PERCENT: 7 % (ref 20–42)
Lab: NORMAL
MCH RBC QN AUTO: 23.8 PG (ref 26–35)
MCHC RBC AUTO-ENTMCNC: 31.6 G/DL (ref 32–34.5)
MCV RBC AUTO: 75.2 FL (ref 80–99.9)
METHADONE UR QL: POSITIVE
MONOCYTES NFR BLD: 0.67 K/UL (ref 0.1–0.95)
MONOCYTES NFR BLD: 5 % (ref 2–12)
NEGATIVE QC PASS/FAIL: NORMAL
NEUTROPHILS NFR BLD: 87 % (ref 43–80)
NEUTS SEG NFR BLD: 11.47 K/UL (ref 1.8–7.3)
NITRITE UR QL STRIP: NEGATIVE
OPIATES UR QL SCN: NEGATIVE
OXYCODONE UR QL SCN: NEGATIVE
PCP UR QL SCN: NEGATIVE
PH UR STRIP: 6 [PH] (ref 5–9)
PLATELET # BLD AUTO: 339 K/UL (ref 130–450)
PMV BLD AUTO: 9 FL (ref 7–12)
POSITIVE QC PASS/FAIL: NORMAL
POTASSIUM SERPL-SCNC: 4.2 MMOL/L (ref 3.5–5)
PROT SERPL-MCNC: 7.8 G/DL (ref 6.4–8.3)
PROT UR STRIP-MCNC: 30 MG/DL
RBC # BLD AUTO: 5.76 M/UL (ref 3.5–5.5)
RBC #/AREA URNS HPF: ABNORMAL /HPF
RSV BY PCR: NOT DETECTED
SARS-COV-2 RDRP RESP QL NAA+PROBE: NOT DETECTED
SODIUM SERPL-SCNC: 139 MMOL/L (ref 132–146)
SP GR UR STRIP: >1.03 (ref 1–1.03)
SPECIMEN DESCRIPTION: NORMAL
SPECIMEN SOURCE: NORMAL
TEST INFORMATION: ABNORMAL
UROBILINOGEN UR STRIP-ACNC: 0.2 EU/DL (ref 0–1)
WBC #/AREA URNS HPF: ABNORMAL /HPF
WBC OTHER # BLD: 13.3 K/UL (ref 4.5–11.5)
YEAST URNS QL MICRO: PRESENT

## 2024-02-06 PROCEDURE — 2580000003 HC RX 258

## 2024-02-06 PROCEDURE — 96374 THER/PROPH/DIAG INJ IV PUSH: CPT

## 2024-02-06 PROCEDURE — 80053 COMPREHEN METABOLIC PANEL: CPT

## 2024-02-06 PROCEDURE — 6360000002 HC RX W HCPCS

## 2024-02-06 PROCEDURE — 1036F TOBACCO NON-USER: CPT | Performed by: PHYSICIAN ASSISTANT

## 2024-02-06 PROCEDURE — 87502 INFLUENZA DNA AMP PROBE: CPT

## 2024-02-06 PROCEDURE — G8427 DOCREV CUR MEDS BY ELIG CLIN: HCPCS | Performed by: PHYSICIAN ASSISTANT

## 2024-02-06 PROCEDURE — 87634 RSV DNA/RNA AMP PROBE: CPT

## 2024-02-06 PROCEDURE — 83605 ASSAY OF LACTIC ACID: CPT

## 2024-02-06 PROCEDURE — 87635 SARS-COV-2 COVID-19 AMP PRB: CPT

## 2024-02-06 PROCEDURE — 74177 CT ABD & PELVIS W/CONTRAST: CPT

## 2024-02-06 PROCEDURE — 99215 OFFICE O/P EST HI 40 MIN: CPT | Performed by: PHYSICIAN ASSISTANT

## 2024-02-06 PROCEDURE — 6360000002 HC RX W HCPCS: Performed by: NURSE PRACTITIONER

## 2024-02-06 PROCEDURE — 85025 COMPLETE CBC W/AUTO DIFF WBC: CPT

## 2024-02-06 PROCEDURE — G0480 DRUG TEST DEF 1-7 CLASSES: HCPCS

## 2024-02-06 PROCEDURE — 99285 EMERGENCY DEPT VISIT HI MDM: CPT

## 2024-02-06 PROCEDURE — 6360000004 HC RX CONTRAST MEDICATION: Performed by: RADIOLOGY

## 2024-02-06 PROCEDURE — 70450 CT HEAD/BRAIN W/O DYE: CPT

## 2024-02-06 PROCEDURE — 81001 URINALYSIS AUTO W/SCOPE: CPT

## 2024-02-06 PROCEDURE — 80179 DRUG ASSAY SALICYLATE: CPT

## 2024-02-06 PROCEDURE — G8484 FLU IMMUNIZE NO ADMIN: HCPCS | Performed by: PHYSICIAN ASSISTANT

## 2024-02-06 PROCEDURE — 96361 HYDRATE IV INFUSION ADD-ON: CPT

## 2024-02-06 PROCEDURE — G8417 CALC BMI ABV UP PARAM F/U: HCPCS | Performed by: PHYSICIAN ASSISTANT

## 2024-02-06 PROCEDURE — 80143 DRUG ASSAY ACETAMINOPHEN: CPT

## 2024-02-06 PROCEDURE — 96375 TX/PRO/DX INJ NEW DRUG ADDON: CPT

## 2024-02-06 PROCEDURE — 83690 ASSAY OF LIPASE: CPT

## 2024-02-06 PROCEDURE — 80307 DRUG TEST PRSMV CHEM ANLYZR: CPT

## 2024-02-06 RX ORDER — AMOXICILLIN AND CLAVULANATE POTASSIUM 875; 125 MG/1; MG/1
TABLET, FILM COATED ORAL
COMMUNITY
Start: 2024-01-30

## 2024-02-06 RX ORDER — METOCLOPRAMIDE HYDROCHLORIDE 5 MG/ML
10 INJECTION INTRAMUSCULAR; INTRAVENOUS ONCE
Status: COMPLETED | OUTPATIENT
Start: 2024-02-06 | End: 2024-02-06

## 2024-02-06 RX ORDER — ONDANSETRON 4 MG/1
4 TABLET, FILM COATED ORAL EVERY 8 HOURS PRN
Qty: 20 TABLET | Refills: 0 | Status: SHIPPED | OUTPATIENT
Start: 2024-02-06

## 2024-02-06 RX ORDER — PROCHLORPERAZINE EDISYLATE 5 MG/ML
10 INJECTION INTRAMUSCULAR; INTRAVENOUS ONCE
Status: COMPLETED | OUTPATIENT
Start: 2024-02-06 | End: 2024-02-06

## 2024-02-06 RX ORDER — ONDANSETRON 2 MG/ML
4 INJECTION INTRAMUSCULAR; INTRAVENOUS ONCE
Status: COMPLETED | OUTPATIENT
Start: 2024-02-06 | End: 2024-02-06

## 2024-02-06 RX ORDER — CHLORHEXIDINE GLUCONATE ORAL RINSE 1.2 MG/ML
SOLUTION DENTAL
COMMUNITY
Start: 2024-01-30

## 2024-02-06 RX ORDER — DOCUSATE SODIUM 100 MG/1
100 CAPSULE, LIQUID FILLED ORAL 2 TIMES DAILY
Qty: 60 CAPSULE | Refills: 0 | Status: SHIPPED | OUTPATIENT
Start: 2024-02-06 | End: 2024-03-07

## 2024-02-06 RX ORDER — IBUPROFEN 800 MG/1
TABLET ORAL
COMMUNITY
Start: 2024-01-30

## 2024-02-06 RX ORDER — 0.9 % SODIUM CHLORIDE 0.9 %
500 INTRAVENOUS SOLUTION INTRAVENOUS ONCE
Status: COMPLETED | OUTPATIENT
Start: 2024-02-06 | End: 2024-02-06

## 2024-02-06 RX ORDER — DROPERIDOL 2.5 MG/ML
1.25 INJECTION, SOLUTION INTRAMUSCULAR; INTRAVENOUS ONCE
Status: COMPLETED | OUTPATIENT
Start: 2024-02-06 | End: 2024-02-06

## 2024-02-06 RX ORDER — PSEUDOEPHED/ACETAMINOPH/DIPHEN 30MG-500MG
TABLET ORAL
COMMUNITY
Start: 2024-01-30

## 2024-02-06 RX ORDER — POLYETHYLENE GLYCOL 3350 17 G/17G
17 POWDER, FOR SOLUTION ORAL DAILY
Qty: 510 G | Refills: 0 | Status: SHIPPED | OUTPATIENT
Start: 2024-02-06 | End: 2024-03-07

## 2024-02-06 RX ORDER — INSULIN ASPART INJECTION 100 [IU]/ML
INJECTION, SOLUTION SUBCUTANEOUS
Qty: 30 ML | Refills: 5 | Status: SHIPPED | OUTPATIENT
Start: 2024-02-06

## 2024-02-06 RX ORDER — 0.9 % SODIUM CHLORIDE 0.9 %
1000 INTRAVENOUS SOLUTION INTRAVENOUS ONCE
Status: COMPLETED | OUTPATIENT
Start: 2024-02-06 | End: 2024-02-06

## 2024-02-06 RX ADMIN — IOPAMIDOL 75 ML: 755 INJECTION, SOLUTION INTRAVENOUS at 17:18

## 2024-02-06 RX ADMIN — SODIUM CHLORIDE 500 ML: 9 INJECTION, SOLUTION INTRAVENOUS at 19:04

## 2024-02-06 RX ADMIN — PROCHLORPERAZINE EDISYLATE 10 MG: 5 INJECTION INTRAMUSCULAR; INTRAVENOUS at 15:48

## 2024-02-06 RX ADMIN — SODIUM CHLORIDE 1000 ML: 9 INJECTION, SOLUTION INTRAVENOUS at 15:33

## 2024-02-06 RX ADMIN — METOCLOPRAMIDE 10 MG: 5 INJECTION, SOLUTION INTRAMUSCULAR; INTRAVENOUS at 19:04

## 2024-02-06 RX ADMIN — DROPERIDOL 1.25 MG: 2.5 INJECTION, SOLUTION INTRAMUSCULAR; INTRAVENOUS at 20:24

## 2024-02-06 RX ADMIN — ONDANSETRON 4 MG: 2 INJECTION INTRAMUSCULAR; INTRAVENOUS at 14:21

## 2024-02-06 ASSESSMENT — PAIN SCALES - GENERAL: PAINLEVEL_OUTOF10: 4

## 2024-02-06 ASSESSMENT — PAIN - FUNCTIONAL ASSESSMENT
PAIN_FUNCTIONAL_ASSESSMENT: 0-10
PAIN_FUNCTIONAL_ASSESSMENT: 0-10

## 2024-02-06 ASSESSMENT — LIFESTYLE VARIABLES
HOW OFTEN DO YOU HAVE A DRINK CONTAINING ALCOHOL: MONTHLY OR LESS
HOW MANY STANDARD DRINKS CONTAINING ALCOHOL DO YOU HAVE ON A TYPICAL DAY: 1 OR 2

## 2024-02-06 ASSESSMENT — PAIN DESCRIPTION - LOCATION: LOCATION: HEAD

## 2024-02-06 NOTE — PROGRESS NOTES
0    Allergies:     Allergies   Allergen Reactions    Celecoxib Hives       Social History:     Social History     Tobacco Use    Smoking status: Never    Smokeless tobacco: Never   Vaping Use    Vaping Use: Never used   Substance Use Topics    Alcohol use: Never    Drug use: Never       Patient lives at home.    Physical Exam:     Vitals:    02/06/24 1330   BP: 136/86   Site: Right Upper Arm   Position: Sitting   Cuff Size: Large Adult   Pulse: 97   Temp: 97.6 °F (36.4 °C)   TempSrc: Temporal   SpO2: 98%   Weight: 117 kg (258 lb)   Height: 1.778 m (5' 10\")       Exam:  Physical Exam  Nurses note and vital signs reviewed and patient is not hypoxic.    General: The patient appears well and in no apparent distress. Patient is resting comfortably on cart.  Skin: Warm, dry, no pallor noted. There is no rash noted.  Head: Normocephalic, atraumatic.  Eye: Normal conjunctiva  Ears, Nose, Mouth, and Throat: Oral mucosa is moist  Cardiovascular: Regular Rate and Rhythm  Respiratory: Patient is in no distress, no accessory muscle use, lungs are clear to auscultation, no wheezing, crackles or rhonchi  Back: Non-tender, no CVA tenderness bilaterally to percussion.  GI: Normal bowel sounds, soft, minimal tenderness to palpation, no masses appreciated. No rebound, guarding, or rigidity noted.  Glucose monitor in place, insulin pump  Musculoskeletal: The patient has no evidence of calf tenderness, no pitting edema, symmetrical pulses noted bilaterally  Neurological: A&O x4, normal speech        Testing:           Medical Decision Making:     Vital signs reviewed    Past medical history reviewed.    Allergies reviewed.    Medications reviewed.    Patient on arrival does not appear to be in any apparent distress or discomfort.  The patient has been seen and evaluated.  The patient does not appear to be toxic or lethargic.     I discussed differential diagnosis with the patient.  Vital signs reviewed.    Will direct the patient to

## 2024-02-06 NOTE — ED PROVIDER NOTES
[facility] EMERGENCY DEPARTMENT  EMERGENCY DEPARTMENT ENCOUNTER        Pt Name: Elbert Olivares  MRN: [id number]  Birthdate 1975  Date of evaluation: 2024  Provider: Levi Min II, DO  PCP: Thierno Vargas MD  Note Started: 3:22 PM EST 24    CHIEF COMPLAINT       Chief Complaint   Patient presents with    Emesis     Sudden onset of symptoms after handling cash Pt seen at PCP office sent to ed to R/O drug overdose    Dizziness       HISTORY OF PRESENT ILLNESS: 1 or more Elements            Elbert Olivares is a 48 y.o. female history of diabetes, hypothyroidism, HLD, GERD, who presents for acute onset nausea and vomiting that started 10 AM this morning.  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  Patient reports over 10 episodes of nonbloody emesis.  Patient denies any chest pain, abdominal pain, shortness of breath, diarrhea, dysuria.  No recent cough, congestion, sore throat, fatigue.  Patient states that she was feeling fine yesterday and this morning prior to emesis.  Patient does note that she ate.  She is with bagel at 8 AM today.  No one else had the same food, no sick contacts, no recent travel.    Nursing Notes were all reviewed and agreed with or any disagreements were addressed in the HPI.      REVIEW OF EXTERNAL NOTE :       Records reviewed for medical history, surgical history, medications.      Chart Review/External Note Review    Last Echo reviewed by Me:  No results found for: \"LVEF\", \"LVEFMODE\"          Controlled Substance Monitoring:    Acute and Chronic Pain Monitoring:        No data to display                    REVIEW OF SYSTEMS :      Positives and Pertinent negatives as per HPI.     SURGICAL HISTORY     Past Surgical History:   Procedure Laterality Date     SECTION      CHOLECYSTECTOMY      COLONOSCOPY N/A 10/27/2022    COLONOSCOPY DIAGNOSTIC performed by Carroll Romeo MD at [facility]

## 2024-02-06 NOTE — ED NOTES
Department of Emergency Medicine  FIRST PROVIDER TRIAGE NOTE             Independent MLP           2/6/24  2:00 PM EST    Date of Encounter: 2/6/24   MRN: [id number]      HPI: Elbert Olivares is a 48 y.o. female who presents to the ED for Emesis (Sudden onset of symptoms after handling cash Pt seen at PCP office sent to ed to R/O drug overdose) and Dizziness     HAS BEEN VOMITING AND EXPERIENCING DIZZINESS SINCE HANDLING CASH SINCE 8AM THIS MORNING.  SHE WAS EVALUATED BY HER PCP, AND WAS TOLD TO COME HERE TO \"RULE OUT DRUG OVERDOSE\".     DENIES ABDOMINAL PAIN.     ROS: Negative for cp or sob.    PE: Gen Appearance/Constitutional: alert  HEENT: NC/NT. PERRLA,  Airway patent.     Initial Plan of Care: All treatment areas with department are currently occupied. Plan to order/Initiate the following while awaiting opening in ED: labs.  Initiate Treatment-Testing, Proceed toTreatment Area When Bed Available for ED Attending/MLP to Continue Care    Electronically signed by DEYANIRA Kirby CNP   DD: 2/6/24      Ruben Graf APRN - CNP  02/06/24 [id number]

## 2024-02-07 LAB
APAP SERPL-MCNC: <5 UG/ML (ref 10–30)
ETHANOLAMINE SERPL-MCNC: <10 MG/DL
SALICYLATES SERPL-MCNC: <0.3 MG/DL (ref 0–30)
TOXIC TRICYCLIC SC,BLOOD: NEGATIVE

## 2024-02-07 NOTE — ED NOTES
Attempt at ABG draw unsuccessful. Patient asked this nurse to stop with blood draw. Patient refusing additional attempts.

## 2024-02-27 ENCOUNTER — OFFICE VISIT (OUTPATIENT)
Dept: ENDOCRINOLOGY | Age: 49
End: 2024-02-27
Payer: COMMERCIAL

## 2024-02-27 VITALS
HEART RATE: 93 BPM | SYSTOLIC BLOOD PRESSURE: 122 MMHG | OXYGEN SATURATION: 98 % | RESPIRATION RATE: 18 BRPM | DIASTOLIC BLOOD PRESSURE: 84 MMHG | WEIGHT: 255 LBS | HEIGHT: 70 IN | BODY MASS INDEX: 36.51 KG/M2

## 2024-02-27 DIAGNOSIS — E03.9 HYPOTHYROIDISM, UNSPECIFIED TYPE: ICD-10-CM

## 2024-02-27 DIAGNOSIS — E11.65 TYPE 2 DIABETES MELLITUS WITH HYPERGLYCEMIA, WITHOUT LONG-TERM CURRENT USE OF INSULIN (HCC): Primary | ICD-10-CM

## 2024-02-27 DIAGNOSIS — E04.1 THYROID NODULE: ICD-10-CM

## 2024-02-27 PROCEDURE — G8484 FLU IMMUNIZE NO ADMIN: HCPCS | Performed by: INTERNAL MEDICINE

## 2024-02-27 PROCEDURE — 1036F TOBACCO NON-USER: CPT | Performed by: INTERNAL MEDICINE

## 2024-02-27 PROCEDURE — 99214 OFFICE O/P EST MOD 30 MIN: CPT | Performed by: INTERNAL MEDICINE

## 2024-02-27 PROCEDURE — 95251 CONT GLUC MNTR ANALYSIS I&R: CPT | Performed by: INTERNAL MEDICINE

## 2024-02-27 PROCEDURE — G8427 DOCREV CUR MEDS BY ELIG CLIN: HCPCS | Performed by: INTERNAL MEDICINE

## 2024-02-27 PROCEDURE — G8417 CALC BMI ABV UP PARAM F/U: HCPCS | Performed by: INTERNAL MEDICINE

## 2024-02-27 PROCEDURE — 2022F DILAT RTA XM EVC RTNOPTHY: CPT | Performed by: INTERNAL MEDICINE

## 2024-02-27 PROCEDURE — 3046F HEMOGLOBIN A1C LEVEL >9.0%: CPT | Performed by: INTERNAL MEDICINE

## 2024-02-27 NOTE — PROGRESS NOTES
[facility] Department of Endocrinology Diabetes and Metabolism   [address] [place] [address]   Phone: [phone]  Fax: [phone]    Date of Service: 2/27/2024  Primary Care Physician: Thierno Vargas MD  Provider: Chan Justice MD     Reason for the visit:  DM type 2, Hypothyroidism, MNG     History of Present Illness:  The history is provided by the patient. No  was used. Accuracy of the patient data is excellent.  Elbert Olivares is a very pleasant 48 y.o. female seen today for diabetes management     Elbert Olivares was diagnosed with diabetes at age 39 and currently on metformin extended release 500 mg 2 tablets twice daily, glipizide extended release 10 mg twice daily.  The patient was recently started on OmniPod 5 with Dexcom G6 CGM with the following settings basal rate 1.65, carb ratio 5.0, insulin sensitivity 21, glucose target 120, active insulin time 3 hours.  Glucose level significantly improved since starting insulin pump.  The patient has been using insulin pump only for few weeks.  The GMI on insulin pump today 7.5%.  Her next A1c will likely be much better.  Lab Results   Component Value Date/Time    LABA1C 10.9 01/16/2024 08:46 AM    LABA1C 10.4 08/09/2022 07:58 AM    LABA1C 8.7 10/11/2021 02:08 PM     Patient has had no hypoglycemic episodes   The patient has been mindful of what has been eating and was following diabetes diet as encouraged   I reviewed current medications and the patient has no issues with diabetes medications  Elbert Olivares is up to date with eye exam and denied any history of diabetic retinopathy   The patient performs  own feet care  Microvascular complications:  No Retinopathy, Nephropathy or Neuropathy   Macrovascular complications: no CAD, PVD, or Stroke  The patient refuses Flushot and pneumonia vaccine     Hypothyroidism   H/o partial thyroidectomy long time ago for MNG. Per pt pathology was benign   She is

## 2024-03-14 ENCOUNTER — OFFICE VISIT (OUTPATIENT)
Dept: PRIMARY CARE CLINIC | Age: 49
End: 2024-03-14
Payer: COMMERCIAL

## 2024-03-14 ENCOUNTER — TELEPHONE (OUTPATIENT)
Dept: ENDOCRINOLOGY | Age: 49
End: 2024-03-14

## 2024-03-14 VITALS
WEIGHT: 260 LBS | TEMPERATURE: 97.5 F | HEART RATE: 100 BPM | OXYGEN SATURATION: 98 % | DIASTOLIC BLOOD PRESSURE: 62 MMHG | BODY MASS INDEX: 37.31 KG/M2 | SYSTOLIC BLOOD PRESSURE: 126 MMHG

## 2024-03-14 DIAGNOSIS — E11.9 TYPE 2 DIABETES MELLITUS WITHOUT COMPLICATION, WITH LONG-TERM CURRENT USE OF INSULIN (HCC): ICD-10-CM

## 2024-03-14 DIAGNOSIS — E55.9 VITAMIN D DEFICIENCY: ICD-10-CM

## 2024-03-14 DIAGNOSIS — E78.2 MIXED HYPERLIPIDEMIA: ICD-10-CM

## 2024-03-14 DIAGNOSIS — F32.A DEPRESSION, UNSPECIFIED DEPRESSION TYPE: ICD-10-CM

## 2024-03-14 DIAGNOSIS — E89.0 POSTOPERATIVE HYPOTHYROIDISM: ICD-10-CM

## 2024-03-14 DIAGNOSIS — Z79.4 TYPE 2 DIABETES MELLITUS WITHOUT COMPLICATION, WITH LONG-TERM CURRENT USE OF INSULIN (HCC): ICD-10-CM

## 2024-03-14 DIAGNOSIS — H26.9 CATARACT OF BOTH EYES, UNSPECIFIED CATARACT TYPE: Primary | ICD-10-CM

## 2024-03-14 DIAGNOSIS — Z01.818 PREOP EXAMINATION: ICD-10-CM

## 2024-03-14 PROCEDURE — 1036F TOBACCO NON-USER: CPT | Performed by: FAMILY MEDICINE

## 2024-03-14 PROCEDURE — 2022F DILAT RTA XM EVC RTNOPTHY: CPT | Performed by: FAMILY MEDICINE

## 2024-03-14 PROCEDURE — 99214 OFFICE O/P EST MOD 30 MIN: CPT | Performed by: FAMILY MEDICINE

## 2024-03-14 PROCEDURE — G8427 DOCREV CUR MEDS BY ELIG CLIN: HCPCS | Performed by: FAMILY MEDICINE

## 2024-03-14 PROCEDURE — G8417 CALC BMI ABV UP PARAM F/U: HCPCS | Performed by: FAMILY MEDICINE

## 2024-03-14 PROCEDURE — G8484 FLU IMMUNIZE NO ADMIN: HCPCS | Performed by: FAMILY MEDICINE

## 2024-03-14 PROCEDURE — 93000 ELECTROCARDIOGRAM COMPLETE: CPT | Performed by: FAMILY MEDICINE

## 2024-03-14 PROCEDURE — 3046F HEMOGLOBIN A1C LEVEL >9.0%: CPT | Performed by: FAMILY MEDICINE

## 2024-03-14 ASSESSMENT — PATIENT HEALTH QUESTIONNAIRE - PHQ9
2. FEELING DOWN, DEPRESSED OR HOPELESS: 0
5. POOR APPETITE OR OVEREATING: 0
SUM OF ALL RESPONSES TO PHQ9 QUESTIONS 1 & 2: 0
SUM OF ALL RESPONSES TO PHQ QUESTIONS 1-9: 0
4. FEELING TIRED OR HAVING LITTLE ENERGY: 0
3. TROUBLE FALLING OR STAYING ASLEEP: 0
SUM OF ALL RESPONSES TO PHQ QUESTIONS 1-9: 0
8. MOVING OR SPEAKING SO SLOWLY THAT OTHER PEOPLE COULD HAVE NOTICED. OR THE OPPOSITE, BEING SO FIGETY OR RESTLESS THAT YOU HAVE BEEN MOVING AROUND A LOT MORE THAN USUAL: 0
1. LITTLE INTEREST OR PLEASURE IN DOING THINGS: 0
SUM OF ALL RESPONSES TO PHQ QUESTIONS 1-9: 0
7. TROUBLE CONCENTRATING ON THINGS, SUCH AS READING THE NEWSPAPER OR WATCHING TELEVISION: 0
10. IF YOU CHECKED OFF ANY PROBLEMS, HOW DIFFICULT HAVE THESE PROBLEMS MADE IT FOR YOU TO DO YOUR WORK, TAKE CARE OF THINGS AT HOME, OR GET ALONG WITH OTHER PEOPLE: 0
9. THOUGHTS THAT YOU WOULD BE BETTER OFF DEAD, OR OF HURTING YOURSELF: 0
6. FEELING BAD ABOUT YOURSELF - OR THAT YOU ARE A FAILURE OR HAVE LET YOURSELF OR YOUR FAMILY DOWN: 0
SUM OF ALL RESPONSES TO PHQ QUESTIONS 1-9: 0

## 2024-03-14 NOTE — TELEPHONE ENCOUNTER
Patient is concerned about insuline allergy reports redness, swelling and burning at pump site.The first entry of insulin patient described as a bee or hornet sting . Would like to switch insulin brand ,will call with insulin alternative per insurance.

## 2024-03-14 NOTE — PROGRESS NOTES
PSA  No results found for: \"PSA\"   HEPATITIS C  No results found for: \"HCVABI\"  HIV  No results found for: \"GCK2ZXM\", \"HIV1QT\"  UA  Lab Results   Component Value Date/Time    COLORU Yellow 02/06/2024 02:15 PM    COLORU [place] 11/28/2023 03:59 PM    COLORU yellow 10/23/2023 04:21 PM    COLORU Yellow 10/14/2023 10:02 AM    COLORU yellow 08/22/2023 01:45 PM    COLORU Yellow 07/30/2022 06:21 PM    CLARITYU Cloudy 11/28/2023 03:59 PM    CLARITYU clear 10/23/2023 04:21 PM    CLARITYU clear 08/22/2023 01:45 PM    CLARITYU SL CLOUDY 07/30/2022 06:21 PM    CLARITYU SL CLOUDY 02/07/2022 10:03 PM    CLARITYU Clear 01/14/2021 11:25 AM    GLUCOSEU 100 02/06/2024 02:15 PM    GLUCOSEU >=1000 mg/dL 11/28/2023 03:59 PM    GLUCOSEU 500 10/23/2023 04:21 PM    GLUCOSEU >=1000 10/14/2023 10:02 AM    GLUCOSEU 500 mg 08/22/2023 01:45 PM    GLUCOSEU >=1000 07/30/2022 06:21 PM    BILIRUBINUR NEGATIVE 02/06/2024 02:15 PM    BILIRUBINUR Negative 11/28/2023 03:59 PM    BILIRUBINUR negative 10/23/2023 04:21 PM    BILIRUBINUR NEGATIVE 10/14/2023 10:02 AM    BILIRUBINUR neg 08/22/2023 01:45 PM    BILIRUBINUR Negative 02/22/2020 12:00 AM    KETUA 40 02/06/2024 02:15 PM    KETUA Trace 11/28/2023 03:59 PM    KETUA negative 10/23/2023 04:21 PM    KETUA NEGATIVE 10/14/2023 10:02 AM    KETUA neg 08/22/2023 01:45 PM    KETUA TRACE 07/30/2022 06:21 PM    SPECGRAV >1.030 02/06/2024 02:15 PM    SPECGRAV >=1.030 11/28/2023 03:59 PM    SPECGRAV 1.015 10/23/2023 04:21 PM    SPECGRAV >1.030 10/14/2023 10:02 AM    SPECGRAV >=1.030 08/22/2023 01:45 PM    SPECGRAV >=1.030 07/30/2022 06:21 PM    BLOODU Negative 11/28/2023 03:59 PM    BLOODU negative 10/23/2023 04:21 PM    BLOODU neg 08/22/2023 01:45 PM    BLOODU Negative 07/30/2022 06:21 PM    BLOODU MODERATE 02/07/2022 10:03 PM    BLOODU Negative 01/14/2021 11:25 AM    PHUR 6.0 02/06/2024 02:15 PM    PHUR 5.5 11/28/2023 03:59 PM    PHUR 7.0 10/23/2023 04:21 PM    PHUR 5.5 10/14/2023 10:02 AM    PHUR 5.5

## 2024-05-22 ENCOUNTER — OFFICE VISIT (OUTPATIENT)
Dept: PRIMARY CARE CLINIC | Age: 49
End: 2024-05-22
Payer: COMMERCIAL

## 2024-05-22 VITALS
HEIGHT: 70 IN | SYSTOLIC BLOOD PRESSURE: 124 MMHG | DIASTOLIC BLOOD PRESSURE: 68 MMHG | OXYGEN SATURATION: 98 % | WEIGHT: 267 LBS | TEMPERATURE: 98 F | BODY MASS INDEX: 38.22 KG/M2 | HEART RATE: 97 BPM
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
## 2024-05-22 PROCEDURE — 1036F TOBACCO NON-USER: CPT | Performed by: FAMILY MEDICINE

## 2024-05-22 PROCEDURE — G8417 CALC BMI ABV UP PARAM F/U: HCPCS | Performed by: FAMILY MEDICINE

## 2024-05-22 PROCEDURE — 99213 OFFICE O/P EST LOW 20 MIN: CPT | Performed by: FAMILY MEDICINE

## 2024-05-22 PROCEDURE — G8427 DOCREV CUR MEDS BY ELIG CLIN: HCPCS | Performed by: FAMILY MEDICINE

## 2024-05-22 RX ORDER — DOXYCYCLINE HYCLATE 100 MG/1
CAPSULE ORAL
Qty: 2 CAPSULE | Refills: 0 | Status: SHIPPED | OUTPATIENT
Start: 2024-05-22

## 2024-05-22 NOTE — PROGRESS NOTES
any.  Plan made after discussion and shared decision making.                 As long as symptoms steadily improve/resolve, and medical conditions follow the expected course, FU as below, sooner PRN.    No follow-ups on file.                Educational materials and/or home exercises printed for patient's review and were included in patient instructions on his/her After Visit Summary and given to patient at the end of visit.       After discussion, patient and/or guardian verbalizes understanding, agrees, feels comfortable with and wishes to proceed with above treatment plan. Call for any pending results, FU sooner if abnormal, as needed or if any current symptoms persist/worsen.      Advised patient to call with any new medication issues, and read all Rx info from pharmacy to assure aware of all possible risks and side effects of medication before taking.     Reviewed age and gender appropriate health screening exams and vaccinations.  Advised patient regarding importance of keeping up with recommended health maintenance and to schedule as soon as possible if overdue, as this is important in assessing for undiagnosed pathology, especially cancer, as well as protecting against potentially harmful/life threatening disease.          Patient and/or guardian verbalizes understanding and agrees with above counseling, assessment and plan.     All questions answered.          Signs and symptoms to watch for discussed, serious signs and symptoms reviewed.  ER if any.            Thierno Vargas MD    Patients are advised to check with insurance company to ensure coverage and to fully understand benefits and cost prior to any testing.  This note was created with the assistance of voice recognition software.  Document was reviewed however may contain grammatical errors.

## 2024-07-06 ENCOUNTER — HOSPITAL ENCOUNTER (OUTPATIENT)
Age: 49
Discharge: HOME | End: 2024-07-06
Payer: COMMERCIAL

## 2024-07-06 DIAGNOSIS — E78.2 MIXED HYPERLIPIDEMIA: ICD-10-CM

## 2024-07-06 DIAGNOSIS — E11.9 TYPE 2 DIABETES MELLITUS WITHOUT COMPLICATION, WITH LONG-TERM CURRENT USE OF INSULIN (HCC): ICD-10-CM

## 2024-07-06 DIAGNOSIS — E55.9 VITAMIN D DEFICIENCY: ICD-10-CM

## 2024-07-06 DIAGNOSIS — Z79.4 TYPE 2 DIABETES MELLITUS WITHOUT COMPLICATION, WITH LONG-TERM CURRENT USE OF INSULIN (HCC): ICD-10-CM

## 2024-07-06 DIAGNOSIS — E89.0 POSTOPERATIVE HYPOTHYROIDISM: ICD-10-CM

## 2024-07-06 LAB
25(OH)D3 SERPL-MCNC: 26.3 NG/ML (ref 30–100)
ALBUMIN SERPL-MCNC: 3.7 G/DL (ref 3.5–5.2)
ALP SERPL-CCNC: 98 U/L (ref 35–104)
ALT SERPL-CCNC: 22 U/L (ref 0–32)
ANION GAP SERPL CALCULATED.3IONS-SCNC: 12 MMOL/L (ref 7–16)
AST SERPL-CCNC: 21 U/L (ref 0–31)
BACTERIA URNS QL MICRO: ABNORMAL
BASOPHILS # BLD: 0.07 K/UL (ref 0–0.2)
BASOPHILS NFR BLD: 1 % (ref 0–2)
BILIRUB SERPL-MCNC: 0.7 MG/DL (ref 0–1.2)
BILIRUB UR QL STRIP: NEGATIVE
BUN SERPL-MCNC: 9 MG/DL (ref 6–20)
CALCIUM SERPL-MCNC: 9.2 MG/DL (ref 8.6–10.2)
CHLORIDE SERPL-SCNC: 99 MMOL/L (ref 98–107)
CHOLEST SERPL-MCNC: 201 MG/DL
CK SERPL-CCNC: 70 U/L (ref 20–180)
CLARITY UR: ABNORMAL
CO2 SERPL-SCNC: 23 MMOL/L (ref 22–29)
COLOR UR: YELLOW
CREAT SERPL-MCNC: 0.6 MG/DL (ref 0.5–1)
CREAT UR-MCNC: 129.1 MG/DL (ref 29–226)
EOSINOPHIL # BLD: 0.09 K/UL (ref 0.05–0.5)
EOSINOPHILS RELATIVE PERCENT: 1 % (ref 0–6)
EPI CELLS #/AREA URNS HPF: ABNORMAL /HPF
ERYTHROCYTE [DISTWIDTH] IN BLOOD BY AUTOMATED COUNT: 17.8 % (ref 11.5–15)
GFR, ESTIMATED: >90 ML/MIN/1.73M2
GLUCOSE SERPL-MCNC: 202 MG/DL (ref 74–99)
GLUCOSE UR STRIP-MCNC: 100 MG/DL
HBA1C MFR BLD: 8.4 % (ref 4–5.6)
HCT VFR BLD AUTO: 38.7 % (ref 34–48)
HDLC SERPL-MCNC: 58 MG/DL
HGB BLD-MCNC: 12.1 G/DL (ref 11.5–15.5)
HGB UR QL STRIP.AUTO: NEGATIVE
IMM GRANULOCYTES # BLD AUTO: 0.04 K/UL (ref 0–0.58)
IMM GRANULOCYTES NFR BLD: 1 % (ref 0–5)
KETONES UR STRIP-MCNC: NEGATIVE MG/DL
LDLC SERPL CALC-MCNC: 116 MG/DL
LEUKOCYTE ESTERASE UR QL STRIP: NEGATIVE
LYMPHOCYTES NFR BLD: 2.64 K/UL (ref 1.5–4)
LYMPHOCYTES RELATIVE PERCENT: 31 % (ref 20–42)
MCH RBC QN AUTO: 24.2 PG (ref 26–35)
MCHC RBC AUTO-ENTMCNC: 31.3 G/DL (ref 32–34.5)
MCV RBC AUTO: 77.4 FL (ref 80–99.9)
MICROALBUMIN UR-MCNC: <12 MG/L (ref 0–19)
MICROALBUMIN/CREAT UR-RTO: NORMAL MCG/MG CREAT (ref 0–30)
MONOCYTES NFR BLD: 0.67 K/UL (ref 0.1–0.95)
MONOCYTES NFR BLD: 8 % (ref 2–12)
NEUTROPHILS NFR BLD: 58 % (ref 43–80)
NEUTS SEG NFR BLD: 4.9 K/UL (ref 1.8–7.3)
NITRITE UR QL STRIP: NEGATIVE
PH UR STRIP: 5.5 [PH] (ref 5–9)
PLATELET # BLD AUTO: 316 K/UL (ref 130–450)
PMV BLD AUTO: 9.1 FL (ref 7–12)
POTASSIUM SERPL-SCNC: 4.2 MMOL/L (ref 3.5–5)
PROT SERPL-MCNC: 7.2 G/DL (ref 6.4–8.3)
PROT UR STRIP-MCNC: NEGATIVE MG/DL
RBC # BLD AUTO: 5 M/UL (ref 3.5–5.5)
RBC #/AREA URNS HPF: ABNORMAL /HPF
SODIUM SERPL-SCNC: 134 MMOL/L (ref 132–146)
SP GR UR STRIP: >1.03 (ref 1–1.03)
T4 FREE SERPL-MCNC: 1 NG/DL (ref 0.9–1.7)
TRIGL SERPL-MCNC: 133 MG/DL
TSH SERPL DL<=0.05 MIU/L-ACNC: 1.23 UIU/ML (ref 0.27–4.2)
UROBILINOGEN UR STRIP-ACNC: 0.2 EU/DL (ref 0–1)
VLDLC SERPL CALC-MCNC: 27 MG/DL
WBC #/AREA URNS HPF: ABNORMAL /HPF
WBC OTHER # BLD: 8.4 K/UL (ref 4.5–11.5)

## 2024-07-06 PROCEDURE — 82306 VITAMIN D 25 HYDROXY: CPT

## 2024-07-06 PROCEDURE — 82043 UR ALBUMIN QUANTITATIVE: CPT

## 2024-07-06 PROCEDURE — 85025 COMPLETE CBC W/AUTO DIFF WBC: CPT

## 2024-07-06 PROCEDURE — 83036 HEMOGLOBIN GLYCOSYLATED A1C: CPT

## 2024-07-06 PROCEDURE — 81001 URINALYSIS AUTO W/SCOPE: CPT

## 2024-07-06 PROCEDURE — 36415 COLL VENOUS BLD VENIPUNCTURE: CPT

## 2024-07-06 PROCEDURE — 82550 ASSAY OF CK (CPK): CPT

## 2024-07-06 PROCEDURE — 84443 ASSAY THYROID STIM HORMONE: CPT

## 2024-07-06 PROCEDURE — 84439 ASSAY OF FREE THYROXINE: CPT

## 2024-07-06 PROCEDURE — 80053 COMPREHEN METABOLIC PANEL: CPT

## 2024-07-06 PROCEDURE — 80061 LIPID PANEL: CPT

## 2024-07-06 PROCEDURE — 82570 ASSAY OF URINE CREATININE: CPT

## 2024-07-11 ENCOUNTER — OFFICE VISIT (OUTPATIENT)
Dept: PRIMARY CARE CLINIC | Age: 49
End: 2024-07-11
Payer: COMMERCIAL

## 2024-07-11 VITALS
OXYGEN SATURATION: 97 % | DIASTOLIC BLOOD PRESSURE: 78 MMHG | BODY MASS INDEX: 38.51 KG/M2 | WEIGHT: 269 LBS | SYSTOLIC BLOOD PRESSURE: 126 MMHG | TEMPERATURE: 97.8 F | HEIGHT: 70 IN | HEART RATE: 85 BPM

## 2024-07-11 DIAGNOSIS — Z79.4 TYPE 2 DIABETES MELLITUS WITHOUT COMPLICATION, WITH LONG-TERM CURRENT USE OF INSULIN (HCC): ICD-10-CM

## 2024-07-11 DIAGNOSIS — F32.A DEPRESSION, UNSPECIFIED DEPRESSION TYPE: ICD-10-CM

## 2024-07-11 DIAGNOSIS — E11.9 TYPE 2 DIABETES MELLITUS WITHOUT COMPLICATION, WITH LONG-TERM CURRENT USE OF INSULIN (HCC): ICD-10-CM

## 2024-07-11 DIAGNOSIS — E55.9 VITAMIN D DEFICIENCY: ICD-10-CM

## 2024-07-11 DIAGNOSIS — E78.2 MIXED HYPERLIPIDEMIA: Primary | ICD-10-CM

## 2024-07-11 DIAGNOSIS — E89.0 POSTOPERATIVE HYPOTHYROIDISM: ICD-10-CM

## 2024-07-11 PROCEDURE — 2022F DILAT RTA XM EVC RTNOPTHY: CPT | Performed by: FAMILY MEDICINE

## 2024-07-11 PROCEDURE — 3052F HG A1C>EQUAL 8.0%<EQUAL 9.0%: CPT | Performed by: FAMILY MEDICINE

## 2024-07-11 PROCEDURE — 99214 OFFICE O/P EST MOD 30 MIN: CPT | Performed by: FAMILY MEDICINE

## 2024-07-11 PROCEDURE — 1036F TOBACCO NON-USER: CPT | Performed by: FAMILY MEDICINE

## 2024-07-11 PROCEDURE — G8417 CALC BMI ABV UP PARAM F/U: HCPCS | Performed by: FAMILY MEDICINE

## 2024-07-11 PROCEDURE — G8427 DOCREV CUR MEDS BY ELIG CLIN: HCPCS | Performed by: FAMILY MEDICINE

## 2024-07-11 RX ORDER — ERGOCALCIFEROL 1.25 MG/1
50000 CAPSULE ORAL WEEKLY
Qty: 12 CAPSULE | Refills: 1 | Status: SHIPPED | OUTPATIENT
Start: 2024-07-11

## 2024-07-11 RX ORDER — ATORVASTATIN CALCIUM 10 MG/1
10 TABLET, FILM COATED ORAL DAILY
Qty: 30 TABLET | Refills: 1 | Status: SHIPPED | OUTPATIENT
Start: 2024-07-11

## 2024-07-30 ENCOUNTER — OFFICE VISIT (OUTPATIENT)
Dept: FAMILY MEDICINE CLINIC | Age: 49
End: 2024-07-30
Payer: COMMERCIAL

## 2024-07-30 VITALS
HEIGHT: 70 IN | OXYGEN SATURATION: 96 % | SYSTOLIC BLOOD PRESSURE: 122 MMHG | HEART RATE: 94 BPM | WEIGHT: 269 LBS | TEMPERATURE: 97.3 F | DIASTOLIC BLOOD PRESSURE: 72 MMHG | BODY MASS INDEX: 38.51 KG/M2

## 2024-07-30 DIAGNOSIS — R35.0 URINARY FREQUENCY: ICD-10-CM

## 2024-07-30 DIAGNOSIS — N30.01 ACUTE CYSTITIS WITH HEMATURIA: Primary | ICD-10-CM

## 2024-07-30 LAB
BILIRUBIN, POC: NEGATIVE
BLOOD URINE, POC: NEGATIVE
CLARITY, POC: NORMAL
COLOR, POC: YELLOW
GLUCOSE URINE, POC: 500
KETONES, POC: NEGATIVE
LEUKOCYTE EST, POC: NEGATIVE
NITRITE, POC: POSITIVE
PH, POC: 6
PROTEIN, POC: NORMAL
SPECIFIC GRAVITY, POC: >1.03
UROBILINOGEN, POC: 0.2

## 2024-07-30 PROCEDURE — 99214 OFFICE O/P EST MOD 30 MIN: CPT | Performed by: PHYSICIAN ASSISTANT

## 2024-07-30 PROCEDURE — 1036F TOBACCO NON-USER: CPT | Performed by: PHYSICIAN ASSISTANT

## 2024-07-30 PROCEDURE — G8417 CALC BMI ABV UP PARAM F/U: HCPCS | Performed by: PHYSICIAN ASSISTANT

## 2024-07-30 PROCEDURE — 81002 URINALYSIS NONAUTO W/O SCOPE: CPT | Performed by: PHYSICIAN ASSISTANT

## 2024-07-30 PROCEDURE — G8427 DOCREV CUR MEDS BY ELIG CLIN: HCPCS | Performed by: PHYSICIAN ASSISTANT

## 2024-07-30 RX ORDER — FLUCONAZOLE 150 MG/1
TABLET ORAL
Qty: 2 TABLET | Refills: 0 | Status: SHIPPED | OUTPATIENT
Start: 2024-07-30

## 2024-07-30 RX ORDER — CEFDINIR 300 MG/1
300 CAPSULE ORAL 2 TIMES DAILY
Qty: 14 CAPSULE | Refills: 0 | Status: SHIPPED | OUTPATIENT
Start: 2024-07-30 | End: 2024-08-06

## 2024-07-30 NOTE — PROGRESS NOTES
24  Elbert Olivares : 1975 Sex: female  Age 48 y.o.      Subjective:  Chief Complaint   Patient presents with    Urinary Frequency    Dysuria         HPI:   HPI  Elbert Olivares , 48 y.o. female presents to express care for evaluation of urinary frequency, dysuria.  The patient thought that she had a yeast infection couple of weeks ago and had done some over-the-counter medication and thought it was resolved.  The patient states that she has noted some increased urinary frequency, urgency, burning with urination.  The patient has had some itching.  The patient is not really having any abdominal pain, back pain, flank pain.  The patient is not having any vaginal discharge.  The patient has had previous hysterectomy.  The patient is not having any lightheadedness, dizziness        ROS:   Unless otherwise stated in this report the patient's positive and negative responses for review of systems for constitutional, eyes, ENT, cardiovascular, respiratory, gastrointestinal, neurological, , musculoskeletal, and integument systems and related systems to the presenting problem are either stated in the history of present illness or were not pertinent or were negative for the symptoms and/or complaints related to the presenting medical problem.  Positives and pertinent negatives as per HPI.  All others reviewed and are negative.      PMH:     Past Medical History:   Diagnosis Date    Depression     Diabetes mellitus (HCC)     GERD (gastroesophageal reflux disease)     History of rectal bleeding     Encounter for colonoscopy    Joint pain     LEXIS on CPAP     Thyroid disease        Past Surgical History:   Procedure Laterality Date     SECTION      CHOLECYSTECTOMY      COLONOSCOPY N/A 10/27/2022    COLONOSCOPY DIAGNOSTIC performed by Carroll Romeo MD at [facility] ENDOSCOPY    HYSTERECTOMY (CERVIX STATUS UNKNOWN)         History reviewed. No pertinent family history.    Medications:     Current Outpatient

## 2024-08-02 LAB
CULTURE: ABNORMAL
SPECIMEN DESCRIPTION: ABNORMAL

## 2024-08-02 NOTE — RESULT ENCOUNTER NOTE
Urine culture did show evidence of UTI.  It is sensitive to the antibiotic.  Continue with the medication call with any questions

## 2024-08-06 DIAGNOSIS — E11.65 TYPE 2 DIABETES MELLITUS WITH HYPERGLYCEMIA, WITHOUT LONG-TERM CURRENT USE OF INSULIN (HCC): ICD-10-CM

## 2024-08-06 RX ORDER — INSULIN ASPART INJECTION 100 [IU]/ML
INJECTION, SOLUTION SUBCUTANEOUS
Qty: 30 ML | Refills: 5 | Status: SHIPPED | OUTPATIENT
Start: 2024-08-06

## 2024-08-15 ENCOUNTER — OFFICE VISIT (OUTPATIENT)
Dept: PRIMARY CARE CLINIC | Age: 49
End: 2024-08-15
Payer: COMMERCIAL

## 2024-08-15 VITALS
BODY MASS INDEX: 38.6 KG/M2 | WEIGHT: 269 LBS | HEART RATE: 90 BPM | OXYGEN SATURATION: 96 % | DIASTOLIC BLOOD PRESSURE: 62 MMHG | SYSTOLIC BLOOD PRESSURE: 126 MMHG | TEMPERATURE: 97.2 F

## 2024-08-15 DIAGNOSIS — E89.0 POSTOPERATIVE HYPOTHYROIDISM: ICD-10-CM

## 2024-08-15 DIAGNOSIS — H65.02 NON-RECURRENT ACUTE SEROUS OTITIS MEDIA OF LEFT EAR: ICD-10-CM

## 2024-08-15 DIAGNOSIS — E11.9 TYPE 2 DIABETES MELLITUS WITHOUT COMPLICATION, WITH LONG-TERM CURRENT USE OF INSULIN (HCC): ICD-10-CM

## 2024-08-15 DIAGNOSIS — E55.9 VITAMIN D DEFICIENCY: ICD-10-CM

## 2024-08-15 DIAGNOSIS — E78.2 MIXED HYPERLIPIDEMIA: Primary | ICD-10-CM

## 2024-08-15 DIAGNOSIS — N30.00 ACUTE CYSTITIS WITHOUT HEMATURIA: ICD-10-CM

## 2024-08-15 DIAGNOSIS — Z79.4 TYPE 2 DIABETES MELLITUS WITHOUT COMPLICATION, WITH LONG-TERM CURRENT USE OF INSULIN (HCC): ICD-10-CM

## 2024-08-15 PROCEDURE — 99214 OFFICE O/P EST MOD 30 MIN: CPT | Performed by: FAMILY MEDICINE

## 2024-08-15 PROCEDURE — 3052F HG A1C>EQUAL 8.0%<EQUAL 9.0%: CPT | Performed by: FAMILY MEDICINE

## 2024-08-15 PROCEDURE — 1036F TOBACCO NON-USER: CPT | Performed by: FAMILY MEDICINE

## 2024-08-15 PROCEDURE — G8417 CALC BMI ABV UP PARAM F/U: HCPCS | Performed by: FAMILY MEDICINE

## 2024-08-15 PROCEDURE — 2022F DILAT RTA XM EVC RTNOPTHY: CPT | Performed by: FAMILY MEDICINE

## 2024-08-15 PROCEDURE — G8427 DOCREV CUR MEDS BY ELIG CLIN: HCPCS | Performed by: FAMILY MEDICINE

## 2024-08-15 RX ORDER — ATORVASTATIN CALCIUM 10 MG/1
10 TABLET, FILM COATED ORAL DAILY
Qty: 90 TABLET | Refills: 1 | Status: SHIPPED | OUTPATIENT
Start: 2024-08-15

## 2024-08-15 RX ORDER — AZITHROMYCIN 250 MG/1
TABLET, FILM COATED ORAL
Qty: 6 TABLET | Refills: 0 | Status: SHIPPED | OUTPATIENT
Start: 2024-08-15

## 2024-08-15 RX ORDER — FLUCONAZOLE 150 MG/1
TABLET ORAL
Qty: 2 TABLET | Refills: 0 | Status: SHIPPED | OUTPATIENT
Start: 2024-08-15

## 2024-08-15 NOTE — PROGRESS NOTES
hyperlipidemia  CBC with Auto Differential    CK    Comprehensive Metabolic Panel    Lipid Panel    atorvastatin (LIPITOR) 10 MG tablet      2. Vitamin D deficiency  Vitamin D 25 Hydroxy      3. Type 2 diabetes mellitus without complication, with long-term current use of insulin (HCC)  CBC with Auto Differential      4. Postoperative hypothyroidism        5. Non-recurrent acute serous otitis media of left ear  azithromycin (ZITHROMAX) 250 MG tablet      6. Acute cystitis without hematuria  Culture, Urine          No problem-specific Assessment & Plan notes found for this encounter.     Left otitis media  Counseled.  Recent cefdinir for UTI.  Z-Ruiz with precautions including prolonged QT C. difficile, interactions reviewed.  Counseled use nasal steroid +/- Claritin daily as needed.  Notify if persists or worsens.  She is concern for impending yeast infection on antibiotic, recommend probiotic usage, Diflucan prescribed as directed as needed with indications reviewed    UTI  Recent over 100,000 Klebsiella UTI, treated with cefdinir, repeat culture          Type 2 diabetes mellitus with hyperglycemia,/on insulin  Stable/improved.  Goals reviewed.  Better controlled now, on insulin pump and CGM.  Check blood work including A1c.  Following with Dr Justice.  Also maintains metformin and glipizide although she states Dr Justice planning to stop glipizide in the near future.   Hyper and hypoglycemic precautions reviewed, micro and macrovascular complications reviewed.  Lifestyle modification emphasized.  Risk of condition especially uncontrolled reviewed.  Watch ambulatory if out of range let us know, ER if dangerous numbers.  Importance of daily foot exams and regular eye exams reviewed as well.  Importance of at least yearly eye exams and daily foot exams reviewed.  Follows up October        History of thyroid cancer    status postresection, partial thyroidectomy.  Follow-up.  Dr Justice    Goiter/history of

## 2024-09-24 RX ORDER — INSULIN LISPRO 100 [IU]/ML
INJECTION, SOLUTION INTRAVENOUS; SUBCUTANEOUS
Qty: 30 ML | Refills: 5 | Status: SHIPPED | OUTPATIENT
Start: 2024-09-24

## 2024-10-22 ENCOUNTER — OFFICE VISIT (OUTPATIENT)
Dept: ENDOCRINOLOGY | Age: 49
End: 2024-10-22
Payer: COMMERCIAL

## 2024-10-22 VITALS
SYSTOLIC BLOOD PRESSURE: 123 MMHG | HEIGHT: 70 IN | HEART RATE: 88 BPM | DIASTOLIC BLOOD PRESSURE: 77 MMHG | WEIGHT: 271 LBS | BODY MASS INDEX: 38.8 KG/M2 | OXYGEN SATURATION: 98 %

## 2024-10-22 DIAGNOSIS — Z97.8 USES SELF-APPLIED CONTINUOUS GLUCOSE MONITORING DEVICE: ICD-10-CM

## 2024-10-22 DIAGNOSIS — E03.9 HYPOTHYROIDISM, UNSPECIFIED TYPE: ICD-10-CM

## 2024-10-22 DIAGNOSIS — Z79.4 INSULIN DEPENDENT TYPE 2 DIABETES MELLITUS (HCC): ICD-10-CM

## 2024-10-22 DIAGNOSIS — E11.65 TYPE 2 DIABETES MELLITUS WITH HYPERGLYCEMIA, WITHOUT LONG-TERM CURRENT USE OF INSULIN (HCC): Primary | ICD-10-CM

## 2024-10-22 DIAGNOSIS — E11.9 INSULIN DEPENDENT TYPE 2 DIABETES MELLITUS (HCC): ICD-10-CM

## 2024-10-22 LAB — HBA1C MFR BLD: 7.7 %

## 2024-10-22 PROCEDURE — 3051F HG A1C>EQUAL 7.0%<8.0%: CPT | Performed by: INTERNAL MEDICINE

## 2024-10-22 PROCEDURE — 83036 HEMOGLOBIN GLYCOSYLATED A1C: CPT | Performed by: INTERNAL MEDICINE

## 2024-10-22 PROCEDURE — 2022F DILAT RTA XM EVC RTNOPTHY: CPT | Performed by: INTERNAL MEDICINE

## 2024-10-22 PROCEDURE — G8427 DOCREV CUR MEDS BY ELIG CLIN: HCPCS | Performed by: INTERNAL MEDICINE

## 2024-10-22 PROCEDURE — 95251 CONT GLUC MNTR ANALYSIS I&R: CPT | Performed by: INTERNAL MEDICINE

## 2024-10-22 PROCEDURE — G8417 CALC BMI ABV UP PARAM F/U: HCPCS | Performed by: INTERNAL MEDICINE

## 2024-10-22 PROCEDURE — G8484 FLU IMMUNIZE NO ADMIN: HCPCS | Performed by: INTERNAL MEDICINE

## 2024-10-22 PROCEDURE — 99214 OFFICE O/P EST MOD 30 MIN: CPT | Performed by: INTERNAL MEDICINE

## 2024-10-22 PROCEDURE — 1036F TOBACCO NON-USER: CPT | Performed by: INTERNAL MEDICINE

## 2024-10-22 RX ORDER — PROCHLORPERAZINE 25 MG/1
SUPPOSITORY RECTAL
Qty: 1 EACH | Refills: 3 | Status: SHIPPED | OUTPATIENT
Start: 2024-10-22

## 2024-10-22 RX ORDER — PROCHLORPERAZINE 25 MG/1
SUPPOSITORY RECTAL
Qty: 9 EACH | Refills: 3 | Status: SHIPPED | OUTPATIENT
Start: 2024-10-22

## 2024-10-22 RX ORDER — INSULIN PMP CART,AUT,G6/7,CNTR
EACH SUBCUTANEOUS
Qty: 10 EACH | Refills: 11 | Status: SHIPPED | OUTPATIENT
Start: 2024-10-22

## 2024-10-22 RX ORDER — INSULIN LISPRO 100 [IU]/ML
INJECTION, SOLUTION INTRAVENOUS; SUBCUTANEOUS
Qty: 30 ML | Refills: 11 | Status: SHIPPED | OUTPATIENT
Start: 2024-10-22

## 2024-10-22 NOTE — PROGRESS NOTES
[facility] Department of Endocrinology Diabetes and Metabolism   [address] [place] [address]   Phone: [phone]  Fax: [phone]    Date of Service: 10/22/2024  Primary Care Physician: Thierno Vargas MD  Provider: Chan Justice MD     Reason for the visit:  DM type 2, Hypothyroidism, MNG     History of Present Illness:  The history is provided by the patient. No  was used. Accuracy of the patient data is excellent.  Elbert Olivares is a very pleasant 48 y.o. female seen today for diabetes management     Elbert Olivares was diagnosed with diabetes at age 39 and currently on metformin extended release 500 mg 2 tablets twice daily, glipizide extended release 10 mg twice daily.  The patient was recently started on OmniPod 5 with Dexcom G6 CGM with the following settings basal rate 1.65, carb ratio 4.0, insulin sensitivity 20, glucose target 120-140, active insulin time 2-1/2 hours    Patient has been using Dexcom G6 CGM.  Lab Results   Component Value Date/Time    LABA1C 7.7 10/22/2024 09:17 AM    LABA1C 8.4 07/06/2024 09:48 AM    LABA1C 10.9 01/16/2024 08:46 AM     Patient has had no hypoglycemic episodes   The patient has been mindful of what has been eating and was following diabetes diet as encouraged   I reviewed current medications and the patient has no issues with diabetes medications  Elbert Olivares is up to date with eye exam and denied any history of diabetic retinopathy   The patient performs  own feet care  Microvascular complications:  No Retinopathy, Nephropathy or Neuropathy   Macrovascular complications: no CAD, PVD, or Stroke  The patient refuses Flushot and pneumonia vaccine     Hypothyroidism   H/o partial thyroidectomy long time ago for MNG. Per pt pathology was benign   She is currently on Levothyhroxine 75 mcg dailty. Patient takes levothyroxine in the morning at empty stomach, wait one hour before eating , avoid multivitamins

## 2024-11-14 SDOH — ECONOMIC STABILITY: FOOD INSECURITY: WITHIN THE PAST 12 MONTHS, YOU WORRIED THAT YOUR FOOD WOULD RUN OUT BEFORE YOU GOT MONEY TO BUY MORE.: NEVER TRUE

## 2024-11-14 SDOH — ECONOMIC STABILITY: TRANSPORTATION INSECURITY
IN THE PAST 12 MONTHS, HAS LACK OF TRANSPORTATION KEPT YOU FROM MEETINGS, WORK, OR FROM GETTING THINGS NEEDED FOR DAILY LIVING?: NO

## 2024-11-14 SDOH — ECONOMIC STABILITY: FOOD INSECURITY: WITHIN THE PAST 12 MONTHS, THE FOOD YOU BOUGHT JUST DIDN'T LAST AND YOU DIDN'T HAVE MONEY TO GET MORE.: NEVER TRUE

## 2024-11-14 SDOH — ECONOMIC STABILITY: INCOME INSECURITY: HOW HARD IS IT FOR YOU TO PAY FOR THE VERY BASICS LIKE FOOD, HOUSING, MEDICAL CARE, AND HEATING?: NOT VERY HARD

## 2024-11-15 ENCOUNTER — OFFICE VISIT (OUTPATIENT)
Dept: PRIMARY CARE CLINIC | Age: 49
End: 2024-11-15
Payer: COMMERCIAL

## 2024-11-15 VITALS
OXYGEN SATURATION: 99 % | TEMPERATURE: 97.6 F | WEIGHT: 273 LBS | SYSTOLIC BLOOD PRESSURE: 122 MMHG | HEIGHT: 70 IN | DIASTOLIC BLOOD PRESSURE: 70 MMHG | BODY MASS INDEX: 39.08 KG/M2 | HEART RATE: 99 BPM

## 2024-11-15 DIAGNOSIS — Z79.4 TYPE 2 DIABETES MELLITUS WITHOUT COMPLICATION, WITH LONG-TERM CURRENT USE OF INSULIN (HCC): ICD-10-CM

## 2024-11-15 DIAGNOSIS — B37.31 VAGINA, CANDIDIASIS: Primary | ICD-10-CM

## 2024-11-15 DIAGNOSIS — Z12.31 BREAST CANCER SCREENING BY MAMMOGRAM: ICD-10-CM

## 2024-11-15 DIAGNOSIS — R39.15 URINARY URGENCY: ICD-10-CM

## 2024-11-15 DIAGNOSIS — E11.9 TYPE 2 DIABETES MELLITUS WITHOUT COMPLICATION, WITH LONG-TERM CURRENT USE OF INSULIN (HCC): ICD-10-CM

## 2024-11-15 DIAGNOSIS — E78.2 MIXED HYPERLIPIDEMIA: ICD-10-CM

## 2024-11-15 DIAGNOSIS — R71.8 MICROCYTOSIS: ICD-10-CM

## 2024-11-15 DIAGNOSIS — E89.0 POSTOPERATIVE HYPOTHYROIDISM: ICD-10-CM

## 2024-11-15 DIAGNOSIS — E55.9 VITAMIN D DEFICIENCY: ICD-10-CM

## 2024-11-15 LAB
BILIRUBIN, POC: NEGATIVE
BLOOD URINE, POC: ABNORMAL
CLARITY, POC: ABNORMAL
COLOR, POC: YELLOW
GLUCOSE URINE, POC: NEGATIVE MG/DL
KETONES, POC: NEGATIVE MG/DL
LEUKOCYTE EST, POC: ABNORMAL
NITRITE, POC: NEGATIVE
PH, POC: 6
PROTEIN, POC: NEGATIVE MG/DL
SPECIFIC GRAVITY, POC: 1.01
UROBILINOGEN, POC: 0.2 MG/DL

## 2024-11-15 PROCEDURE — 81002 URINALYSIS NONAUTO W/O SCOPE: CPT | Performed by: FAMILY MEDICINE

## 2024-11-15 PROCEDURE — G8427 DOCREV CUR MEDS BY ELIG CLIN: HCPCS | Performed by: FAMILY MEDICINE

## 2024-11-15 PROCEDURE — 2022F DILAT RTA XM EVC RTNOPTHY: CPT | Performed by: FAMILY MEDICINE

## 2024-11-15 PROCEDURE — G8417 CALC BMI ABV UP PARAM F/U: HCPCS | Performed by: FAMILY MEDICINE

## 2024-11-15 PROCEDURE — G8484 FLU IMMUNIZE NO ADMIN: HCPCS | Performed by: FAMILY MEDICINE

## 2024-11-15 PROCEDURE — 3051F HG A1C>EQUAL 7.0%<8.0%: CPT | Performed by: FAMILY MEDICINE

## 2024-11-15 PROCEDURE — 99214 OFFICE O/P EST MOD 30 MIN: CPT | Performed by: FAMILY MEDICINE

## 2024-11-15 PROCEDURE — 1036F TOBACCO NON-USER: CPT | Performed by: FAMILY MEDICINE

## 2024-11-15 RX ORDER — FLUCONAZOLE 150 MG/1
TABLET ORAL
Qty: 2 TABLET | Refills: 0 | Status: SHIPPED | OUTPATIENT
Start: 2024-11-15

## 2024-11-15 NOTE — PROGRESS NOTES
Livierana Olivares : 1975 Sex: female  Age: 48 y.o.    Chief Complaint   Patient presents with    3 Month Follow-Up    Urinary Tract Infection     X 1-1.5 weeks        HPI  HPI:      Presents for follow-up.  However did not get blood work yet.  Recently saw Dr Justice.  Gets additional blood work in February for him.  Will get blood work for me in the next week or so.  In the meantime notes vaginal itching superficial discomfort and urinary urgency some frequency no dysuria or hematuria no abdominal pain back pain, no other complaints or concerns.  Blood pressure stable.  Counseled on weight.  Tolerating medications        Review of Systems  ROS:  Const: Denies chills, fever, malaise and sweats.  Eyes: Denies discharge, pain, redness or visual disturbance  ENMT: Denies earache, nasal or sinus symptoms other than as stated  above. Denies mouth and tongue lesions and sore throat.  CV: Denies chest discomfort, pain; diaphoresis, dizziness, edema, lightheadedness, orthopnea,  palpitations, syncope and near syncopal episode or any exertional symptoms  Resp: Denies cough, hemoptysis, pleuritic pain, SOB, sputum production and wheezing.  GI: Denies abdominal pain, change in bowel habits, hematochezia, melena, nausea and vomiting.  : As above  Musculo: Denies musculoskeletal symptoms.  Skin: Denies bruising and rash.  Neuro: Denies headache, numbness, stiff neck, tingling and focal weakness slurred speech or facial  droop  Hema/Lymph: Denies bleeding/bruising tendency and enlarged lymph nodes      Most Recent Labs  CBC  Lab Results   Component Value Date/Time    WBC 8.4 2024 09:48 AM    WBC 13.3 2024 02:09 PM    WBC 6.4 10/14/2023 10:02 AM    RBC 5.00 2024 09:48 AM    RBC 5.76 2024 02:09 PM    RBC 5.57 10/14/2023 10:02 AM    HGB 12.1 2024 09:48 AM    HGB 13.7 2024 02:09 PM    HGB 13.1 10/14/2023 10:02 AM    HCT 38.7 2024 09:48 AM    HCT 43.3 2024 02:09 PM    HCT 42.6

## 2024-11-17 LAB
CULTURE: NORMAL
CULTURE: NORMAL
SPECIMEN DESCRIPTION: NORMAL

## 2024-12-03 ENCOUNTER — TELEPHONE (OUTPATIENT)
Dept: ADMINISTRATIVE | Age: 49
End: 2024-12-03

## 2024-12-03 NOTE — TELEPHONE ENCOUNTER
Pt requested to schedule a new pt appt for recurring yeast infections and possible cyst on rt ovary.  She has pain on right side once a month x 6 months.  Pt only has 1 ovary since she had a hysterectomy.  Front office advised encounter be sent to advise on scheduling  since no soon availability.  Please contact pt or advise on scheduling.

## 2024-12-09 DIAGNOSIS — Z79.4 TYPE 2 DIABETES MELLITUS WITHOUT COMPLICATION, WITH LONG-TERM CURRENT USE OF INSULIN (HCC): ICD-10-CM

## 2024-12-09 DIAGNOSIS — E11.65 TYPE 2 DIABETES MELLITUS WITH HYPERGLYCEMIA, WITHOUT LONG-TERM CURRENT USE OF INSULIN (HCC): ICD-10-CM

## 2024-12-09 DIAGNOSIS — E11.9 INSULIN DEPENDENT TYPE 2 DIABETES MELLITUS (HCC): ICD-10-CM

## 2024-12-09 DIAGNOSIS — E11.9 TYPE 2 DIABETES MELLITUS WITHOUT COMPLICATION, WITH LONG-TERM CURRENT USE OF INSULIN (HCC): ICD-10-CM

## 2024-12-09 DIAGNOSIS — Z79.4 INSULIN DEPENDENT TYPE 2 DIABETES MELLITUS (HCC): ICD-10-CM

## 2024-12-09 RX ORDER — PROCHLORPERAZINE 25 MG/1
SUPPOSITORY RECTAL
Qty: 1 EACH | Refills: 3 | Status: SHIPPED | OUTPATIENT
Start: 2024-12-09

## 2024-12-09 RX ORDER — METFORMIN HYDROCHLORIDE 500 MG/1
1000 TABLET, EXTENDED RELEASE ORAL 2 TIMES DAILY
Qty: 360 TABLET | Refills: 3 | Status: SHIPPED | OUTPATIENT
Start: 2024-12-09

## 2024-12-09 RX ORDER — GLIPIZIDE 10 MG/1
10 TABLET, FILM COATED, EXTENDED RELEASE ORAL 2 TIMES DAILY
Qty: 180 TABLET | Refills: 3 | Status: SHIPPED | OUTPATIENT
Start: 2024-12-09

## 2024-12-09 RX ORDER — INSULIN LISPRO 100 [IU]/ML
INJECTION, SOLUTION INTRAVENOUS; SUBCUTANEOUS
Qty: 30 ML | Refills: 11 | Status: SHIPPED | OUTPATIENT
Start: 2024-12-09

## 2024-12-09 RX ORDER — INSULIN PMP CART,AUT,G6/7,CNTR
EACH SUBCUTANEOUS
Qty: 10 EACH | Refills: 11 | Status: SHIPPED | OUTPATIENT
Start: 2024-12-09

## 2024-12-09 RX ORDER — PROCHLORPERAZINE 25 MG/1
SUPPOSITORY RECTAL
Qty: 9 EACH | Refills: 3 | Status: SHIPPED | OUTPATIENT
Start: 2024-12-09

## 2025-01-12 ENCOUNTER — OFFICE VISIT (OUTPATIENT)
Dept: FAMILY MEDICINE CLINIC | Age: 50
End: 2025-01-12
Payer: COMMERCIAL

## 2025-01-12 VITALS
RESPIRATION RATE: 16 BRPM | WEIGHT: 265 LBS | TEMPERATURE: 97.8 F | HEART RATE: 90 BPM | BODY MASS INDEX: 37.94 KG/M2 | SYSTOLIC BLOOD PRESSURE: 136 MMHG | HEIGHT: 70 IN | DIASTOLIC BLOOD PRESSURE: 78 MMHG | OXYGEN SATURATION: 98 %

## 2025-01-12 DIAGNOSIS — N39.0 URINARY TRACT INFECTION WITHOUT HEMATURIA, SITE UNSPECIFIED: Primary | ICD-10-CM

## 2025-01-12 DIAGNOSIS — N30.00 ACUTE CYSTITIS WITHOUT HEMATURIA: ICD-10-CM

## 2025-01-12 DIAGNOSIS — T36.95XA ANTIBIOTIC-INDUCED YEAST INFECTION: ICD-10-CM

## 2025-01-12 DIAGNOSIS — B37.9 ANTIBIOTIC-INDUCED YEAST INFECTION: ICD-10-CM

## 2025-01-12 DIAGNOSIS — R39.9 UTI SYMPTOMS: ICD-10-CM

## 2025-01-12 LAB
BILIRUBIN, POC: NEGATIVE
BLOOD URINE, POC: NEGATIVE
CLARITY, POC: CLEAR
COLOR, POC: NORMAL
GLUCOSE URINE, POC: NEGATIVE MG/DL
KETONES, POC: NEGATIVE MG/DL
LEUKOCYTE EST, POC: NORMAL
NITRITE, POC: POSITIVE
PH, POC: 5.5
PROTEIN, POC: 30 MG/DL
SPECIFIC GRAVITY, POC: >=1.03
UROBILINOGEN, POC: 1 MG/DL

## 2025-01-12 PROCEDURE — 81002 URINALYSIS NONAUTO W/O SCOPE: CPT | Performed by: NURSE PRACTITIONER

## 2025-01-12 PROCEDURE — 99213 OFFICE O/P EST LOW 20 MIN: CPT | Performed by: NURSE PRACTITIONER

## 2025-01-12 PROCEDURE — G8417 CALC BMI ABV UP PARAM F/U: HCPCS | Performed by: NURSE PRACTITIONER

## 2025-01-12 PROCEDURE — 1036F TOBACCO NON-USER: CPT | Performed by: NURSE PRACTITIONER

## 2025-01-12 PROCEDURE — G8427 DOCREV CUR MEDS BY ELIG CLIN: HCPCS | Performed by: NURSE PRACTITIONER

## 2025-01-12 RX ORDER — CEFDINIR 300 MG/1
300 CAPSULE ORAL 2 TIMES DAILY
Qty: 14 CAPSULE | Refills: 0 | Status: SHIPPED | OUTPATIENT
Start: 2025-01-12 | End: 2025-01-19

## 2025-01-12 RX ORDER — FLUCONAZOLE 150 MG/1
TABLET ORAL
Qty: 2 TABLET | Refills: 0 | Status: SHIPPED | OUTPATIENT
Start: 2025-01-12

## 2025-01-14 LAB
CULTURE: ABNORMAL
SPECIMEN DESCRIPTION: ABNORMAL

## 2025-01-21 ENCOUNTER — OFFICE VISIT (OUTPATIENT)
Dept: OBGYN | Age: 50
End: 2025-01-21
Payer: COMMERCIAL

## 2025-01-21 VITALS
SYSTOLIC BLOOD PRESSURE: 138 MMHG | OXYGEN SATURATION: 98 % | HEART RATE: 100 BPM | TEMPERATURE: 98.1 F | DIASTOLIC BLOOD PRESSURE: 84 MMHG | BODY MASS INDEX: 39.46 KG/M2 | WEIGHT: 275 LBS

## 2025-01-21 DIAGNOSIS — N76.0 VULVOVAGINITIS: Primary | ICD-10-CM

## 2025-01-21 PROCEDURE — 99203 OFFICE O/P NEW LOW 30 MIN: CPT | Performed by: OBSTETRICS & GYNECOLOGY

## 2025-01-21 PROCEDURE — G8427 DOCREV CUR MEDS BY ELIG CLIN: HCPCS | Performed by: OBSTETRICS & GYNECOLOGY

## 2025-01-21 PROCEDURE — 1036F TOBACCO NON-USER: CPT | Performed by: OBSTETRICS & GYNECOLOGY

## 2025-01-21 PROCEDURE — G8417 CALC BMI ABV UP PARAM F/U: HCPCS | Performed by: OBSTETRICS & GYNECOLOGY

## 2025-01-21 SDOH — ECONOMIC STABILITY: FOOD INSECURITY: WITHIN THE PAST 12 MONTHS, THE FOOD YOU BOUGHT JUST DIDN'T LAST AND YOU DIDN'T HAVE MONEY TO GET MORE.: NEVER TRUE

## 2025-01-21 SDOH — ECONOMIC STABILITY: FOOD INSECURITY: WITHIN THE PAST 12 MONTHS, YOU WORRIED THAT YOUR FOOD WOULD RUN OUT BEFORE YOU GOT MONEY TO BUY MORE.: NEVER TRUE

## 2025-01-21 ASSESSMENT — PATIENT HEALTH QUESTIONNAIRE - PHQ9
9. THOUGHTS THAT YOU WOULD BE BETTER OFF DEAD, OR OF HURTING YOURSELF: NOT AT ALL
8. MOVING OR SPEAKING SO SLOWLY THAT OTHER PEOPLE COULD HAVE NOTICED. OR THE OPPOSITE, BEING SO FIGETY OR RESTLESS THAT YOU HAVE BEEN MOVING AROUND A LOT MORE THAN USUAL: NOT AT ALL
SUM OF ALL RESPONSES TO PHQ9 QUESTIONS 1 & 2: 2
7. TROUBLE CONCENTRATING ON THINGS, SUCH AS READING THE NEWSPAPER OR WATCHING TELEVISION: NOT AT ALL
3. TROUBLE FALLING OR STAYING ASLEEP: NEARLY EVERY DAY
SUM OF ALL RESPONSES TO PHQ QUESTIONS 1-9: 9
1. LITTLE INTEREST OR PLEASURE IN DOING THINGS: SEVERAL DAYS
4. FEELING TIRED OR HAVING LITTLE ENERGY: NEARLY EVERY DAY
6. FEELING BAD ABOUT YOURSELF - OR THAT YOU ARE A FAILURE OR HAVE LET YOURSELF OR YOUR FAMILY DOWN: SEVERAL DAYS
10. IF YOU CHECKED OFF ANY PROBLEMS, HOW DIFFICULT HAVE THESE PROBLEMS MADE IT FOR YOU TO DO YOUR WORK, TAKE CARE OF THINGS AT HOME, OR GET ALONG WITH OTHER PEOPLE: SOMEWHAT DIFFICULT
SUM OF ALL RESPONSES TO PHQ QUESTIONS 1-9: 9
5. POOR APPETITE OR OVEREATING: NOT AT ALL
SUM OF ALL RESPONSES TO PHQ QUESTIONS 1-9: 9
2. FEELING DOWN, DEPRESSED OR HOPELESS: SEVERAL DAYS
SUM OF ALL RESPONSES TO PHQ QUESTIONS 1-9: 9

## 2025-01-21 ASSESSMENT — ENCOUNTER SYMPTOMS
VOMITING: 0
CONSTIPATION: 0
DIARRHEA: 0
ABDOMINAL PAIN: 0
SHORTNESS OF BREATH: 0
WHEEZING: 0
BLOOD IN STOOL: 0
NAUSEA: 0
COUGH: 0

## 2025-01-21 NOTE — PROGRESS NOTES
Elbert Olivares is a 49-year-old  female who underwent a [facility] RSO for menorrhagia approximately 15 years ago.  No family history of ovarian, breast, colon or prostate CA.  She and her partner are sexually active with a 20-year history of dyspareunia in the posterior fourchette.  History of depression relatively well-controlled.  No current suicidal ideation no past or present history of physical sexual or verbal abuse  No cervical cancer screening on file   Date of last Mammogram: 2022 normal  Colonoscopy  normal  Recently treated for a UTI was given Diflucan at the same time took 1 with beginnings of improvement in her vaginal/vulvar pruritus.  Hemoglobin A1c last 1 was 7  GYN History  Abn pap  STI  LEEP/ cryo/cone  Uterine surgery  section x 3 hysterectomy  Ovary or tubal surgery RSO    Patient presents for     Past Medical History:   Diagnosis Date    Depression     Diabetes mellitus (HCC)     GERD (gastroesophageal reflux disease)     Joint pain     LEXIS on CPAP     Thyroid disease         Past Surgical History:   Procedure Laterality Date     SECTION      X3    CHOLECYSTECTOMY      COLONOSCOPY N/A 10/27/2022    COLONOSCOPY DIAGNOSTIC performed by Carroll Romeo MD at [facility] ENDOSCOPY    HYSTERECTOMY (CERVIX STATUS UNKNOWN)      SALPINGO-OOPHORECTOMY Left     With her hysterectomy for menorrhagia        No family history on file.       Current Outpatient Medications:     amLODIPine-Olmesartan (NABILA PO), Take by mouth, Disp: , Rfl:     fluconazole (DIFLUCAN) 150 MG tablet, 1 tab po x 1 dose, may repeat in 72 hrs if still symptomatic., Disp: 2 tablet, Rfl: 0    Continuous Glucose Sensor (DEXCOM G6 SENSOR) MISC, Change sensors every 10 days, Disp: 9 each, Rfl: 3    Continuous Glucose Transmitter (DEXCOM G6 TRANSMITTER) MISC, Change every 90 days, Disp: 1 each, Rfl: 3    Insulin Disposable Pump (OMNIPOD 5 [id number] PODS GEN 5) MISC, Change Pods every 3 days, Disp: 10 each, Rfl: 11    insulin

## 2025-01-21 NOTE — PROGRESS NOTES
New patient here today with complaints of right ovarian cyst and recurring yeast infections. Self referred. Had a hysterectomy, right ovary remains.   Patient just finished meds for uti and now struggling with yeast. Took one diflucan so far. Very raw and some bleeding in vaginal area.   Has noticed upper abdomen has been very hard since menopause.   Discharge instructions have been discussed with the patient. Patient advised to call our office with any questions or concerns.   Voiced understanding.  Health tracks obtained, labeled and sent to lab

## 2025-01-22 DIAGNOSIS — N76.0 VULVOVAGINITIS: ICD-10-CM

## 2025-01-23 ENCOUNTER — TELEPHONE (OUTPATIENT)
Dept: OBGYN | Age: 50
End: 2025-01-23

## 2025-01-23 RX ORDER — AZITHROMYCIN 500 MG/1
TABLET, FILM COATED ORAL
Qty: 5 TABLET | Refills: 0 | Status: SHIPPED | OUTPATIENT
Start: 2025-01-23

## 2025-01-23 NOTE — PROGRESS NOTES
Health tracks was performed positive for Ureaplasma she is reporting continued symptoms and would like treated Rx sent for Zithromax

## 2025-01-23 NOTE — TELEPHONE ENCOUNTER
----- Message from Dr. Sherrill Becker, DO sent at 1/22/2025  2:42 PM EST -----  Please let her know that her culture was negative for any yeast.  She did test positive for a bacteria called Ureaplasma  sometimes that is normal sometimes it can be an infection.  If her symptoms are completely gone that she presented to the office with no treatment needs to occur if she is still having symptoms that are bothersome we then treat this

## 2025-01-23 NOTE — TELEPHONE ENCOUNTER
Spoke with patient regarding her health tracks culture results. Patient states she is still having symptoms and would like to be treated.

## 2025-01-30 ENCOUNTER — TELEPHONE (OUTPATIENT)
Dept: ENDOCRINOLOGY | Age: 50
End: 2025-01-30

## 2025-02-02 ENCOUNTER — OFFICE VISIT (OUTPATIENT)
Dept: FAMILY MEDICINE CLINIC | Age: 50
End: 2025-02-02

## 2025-02-02 VITALS
TEMPERATURE: 97.7 F | SYSTOLIC BLOOD PRESSURE: 138 MMHG | WEIGHT: 260 LBS | OXYGEN SATURATION: 98 % | HEIGHT: 70 IN | HEART RATE: 80 BPM | BODY MASS INDEX: 37.22 KG/M2 | DIASTOLIC BLOOD PRESSURE: 88 MMHG | RESPIRATION RATE: 16 BRPM

## 2025-02-02 DIAGNOSIS — J01.90 ACUTE BACTERIAL SINUSITIS: Primary | ICD-10-CM

## 2025-02-02 DIAGNOSIS — B96.89 ACUTE BACTERIAL SINUSITIS: Primary | ICD-10-CM

## 2025-02-02 RX ORDER — CLINDAMYCIN HYDROCHLORIDE 300 MG/1
300 CAPSULE ORAL 3 TIMES DAILY
Qty: 30 CAPSULE | Refills: 0 | Status: SHIPPED | OUTPATIENT
Start: 2025-02-02 | End: 2025-02-12

## 2025-02-02 RX ORDER — TRIAMCINOLONE ACETONIDE 40 MG/ML
40 INJECTION, SUSPENSION INTRA-ARTICULAR; INTRAMUSCULAR ONCE
Status: COMPLETED | OUTPATIENT
Start: 2025-02-02 | End: 2025-02-02

## 2025-02-02 RX ADMIN — TRIAMCINOLONE ACETONIDE 40 MG: 40 INJECTION, SUSPENSION INTRA-ARTICULAR; INTRAMUSCULAR at 11:03

## 2025-02-07 ENCOUNTER — PATIENT MESSAGE (OUTPATIENT)
Dept: PRIMARY CARE CLINIC | Age: 50
End: 2025-02-07

## 2025-02-18 DIAGNOSIS — E11.65 TYPE 2 DIABETES MELLITUS WITH HYPERGLYCEMIA, WITHOUT LONG-TERM CURRENT USE OF INSULIN (HCC): ICD-10-CM

## 2025-02-18 DIAGNOSIS — E11.9 INSULIN DEPENDENT TYPE 2 DIABETES MELLITUS (HCC): ICD-10-CM

## 2025-02-18 DIAGNOSIS — Z79.4 INSULIN DEPENDENT TYPE 2 DIABETES MELLITUS (HCC): ICD-10-CM

## 2025-02-19 RX ORDER — INSULIN LISPRO 100 [IU]/ML
INJECTION, SOLUTION INTRAVENOUS; SUBCUTANEOUS
Qty: 90 ML | Refills: 1 | Status: SHIPPED | OUTPATIENT
Start: 2025-02-19

## 2025-02-19 RX ORDER — INSULIN PMP CART,AUT,G6/7,CNTR
1 EACH SUBCUTANEOUS
Qty: 30 EACH | Refills: 1 | Status: SHIPPED | OUTPATIENT
Start: 2025-02-19

## 2025-02-22 ENCOUNTER — HOSPITAL ENCOUNTER (OUTPATIENT)
Age: 50
Discharge: HOME | End: 2025-02-22
Payer: COMMERCIAL

## 2025-02-22 DIAGNOSIS — R71.8 MICROCYTOSIS: ICD-10-CM

## 2025-02-22 DIAGNOSIS — E89.0 POSTOPERATIVE HYPOTHYROIDISM: ICD-10-CM

## 2025-02-22 DIAGNOSIS — E03.9 HYPOTHYROIDISM, UNSPECIFIED TYPE: ICD-10-CM

## 2025-02-22 DIAGNOSIS — E11.65 TYPE 2 DIABETES MELLITUS WITH HYPERGLYCEMIA, WITHOUT LONG-TERM CURRENT USE OF INSULIN (HCC): ICD-10-CM

## 2025-02-22 LAB
ANION GAP SERPL CALCULATED.3IONS-SCNC: 12 MMOL/L (ref 7–16)
BUN SERPL-MCNC: 12 MG/DL (ref 6–20)
CALCIUM SERPL-MCNC: 9.1 MG/DL (ref 8.6–10.2)
CHLORIDE SERPL-SCNC: 103 MMOL/L (ref 98–107)
CHOLEST SERPL-MCNC: 148 MG/DL
CO2 SERPL-SCNC: 23 MMOL/L (ref 22–29)
CREAT SERPL-MCNC: 0.7 MG/DL (ref 0.5–1)
CREAT UR-MCNC: 84.6 MG/DL (ref 29–226)
FERRITIN SERPL-MCNC: 30 NG/ML
GFR, ESTIMATED: >90 ML/MIN/1.73M2
GLUCOSE SERPL-MCNC: 177 MG/DL (ref 74–99)
HBA1C MFR BLD: 8.4 % (ref 4–5.6)
HDLC SERPL-MCNC: 62 MG/DL
LDLC SERPL CALC-MCNC: 66 MG/DL
MICROALBUMIN UR-MCNC: <12 MG/L (ref 0–19)
MICROALBUMIN/CREAT UR-RTO: NORMAL MCG/MG CREAT (ref 0–30)
POTASSIUM SERPL-SCNC: 3.7 MMOL/L (ref 3.5–5)
SODIUM SERPL-SCNC: 138 MMOL/L (ref 132–146)
T4 FREE SERPL-MCNC: 1.1 NG/DL (ref 0.9–1.7)
T4 FREE SERPL-MCNC: 1.1 NG/DL (ref 0.9–1.7)
TRIGL SERPL-MCNC: 101 MG/DL
TSH SERPL DL<=0.05 MIU/L-ACNC: 0.74 UIU/ML (ref 0.27–4.2)
TSH SERPL DL<=0.05 MIU/L-ACNC: 0.75 UIU/ML (ref 0.27–4.2)
VLDLC SERPL CALC-MCNC: 20 MG/DL

## 2025-02-22 PROCEDURE — 84439 ASSAY OF FREE THYROXINE: CPT

## 2025-02-22 PROCEDURE — 82728 ASSAY OF FERRITIN: CPT

## 2025-02-22 PROCEDURE — 83020 HEMOGLOBIN ELECTROPHORESIS: CPT

## 2025-02-22 PROCEDURE — 87086 URINE CULTURE/COLONY COUNT: CPT

## 2025-02-22 PROCEDURE — 82570 ASSAY OF URINE CREATININE: CPT

## 2025-02-22 PROCEDURE — 80061 LIPID PANEL: CPT

## 2025-02-22 PROCEDURE — 36415 COLL VENOUS BLD VENIPUNCTURE: CPT

## 2025-02-22 PROCEDURE — 84443 ASSAY THYROID STIM HORMONE: CPT

## 2025-02-22 PROCEDURE — 82043 UR ALBUMIN QUANTITATIVE: CPT

## 2025-02-22 PROCEDURE — 80048 BASIC METABOLIC PNL TOTAL CA: CPT

## 2025-02-22 PROCEDURE — 83036 HEMOGLOBIN GLYCOSYLATED A1C: CPT

## 2025-02-23 LAB
MICROORGANISM SPEC CULT: ABNORMAL
MICROORGANISM SPEC CULT: ABNORMAL
SPECIMEN DESCRIPTION: ABNORMAL

## 2025-02-26 ENCOUNTER — OFFICE VISIT (OUTPATIENT)
Dept: ENDOCRINOLOGY | Age: 50
End: 2025-02-26

## 2025-02-26 VITALS
HEART RATE: 85 BPM | BODY MASS INDEX: 39.51 KG/M2 | OXYGEN SATURATION: 98 % | HEIGHT: 70 IN | SYSTOLIC BLOOD PRESSURE: 123 MMHG | DIASTOLIC BLOOD PRESSURE: 77 MMHG | RESPIRATION RATE: 18 BRPM | WEIGHT: 276 LBS

## 2025-02-26 DIAGNOSIS — E11.9 TYPE 2 DIABETES MELLITUS WITHOUT COMPLICATION, WITH LONG-TERM CURRENT USE OF INSULIN (HCC): ICD-10-CM

## 2025-02-26 DIAGNOSIS — Z96.41 INSULIN PUMP IN PLACE: ICD-10-CM

## 2025-02-26 DIAGNOSIS — E03.9 HYPOTHYROIDISM, UNSPECIFIED TYPE: ICD-10-CM

## 2025-02-26 DIAGNOSIS — Z79.4 TYPE 2 DIABETES MELLITUS WITHOUT COMPLICATION, WITH LONG-TERM CURRENT USE OF INSULIN (HCC): ICD-10-CM

## 2025-02-26 DIAGNOSIS — Z79.4 INSULIN DEPENDENT TYPE 2 DIABETES MELLITUS (HCC): ICD-10-CM

## 2025-02-26 DIAGNOSIS — E04.1 THYROID NODULE: ICD-10-CM

## 2025-02-26 DIAGNOSIS — E11.65 TYPE 2 DIABETES MELLITUS WITH HYPERGLYCEMIA, WITHOUT LONG-TERM CURRENT USE OF INSULIN (HCC): Primary | ICD-10-CM

## 2025-02-26 DIAGNOSIS — Z97.8 USES SELF-APPLIED CONTINUOUS GLUCOSE MONITORING DEVICE: ICD-10-CM

## 2025-02-26 DIAGNOSIS — E11.9 INSULIN DEPENDENT TYPE 2 DIABETES MELLITUS (HCC): ICD-10-CM

## 2025-02-26 LAB
HGB ELECTROPHORESIS INTERP: NORMAL
PATHOLOGIST: NORMAL

## 2025-02-26 NOTE — PROGRESS NOTES
[facility] [place] [facility] Department of Endocrinology Diabetes and Metabolism   [address] [place] [address]   Phone: [phone]  Fax: [phone]    Date of Service: 2/26/2025  Primary Care Physician: Thierno Vargas MD  Provider: Chan Justice MD     Reason for the visit:  DM type 2, Hypothyroidism, MNG     History of Present Illness:  The history is provided by the patient. No  was used. Accuracy of the patient data is excellent.  Elbert Olivares is a very pleasant 49 y.o. female seen today for diabetes management     Elbert Olivares was diagnosed with diabetes at age 39 and currently on metformin extended release 500 mg 2 tablets twice daily, glipizide extended release 10 mg twice daily.  The patient was recently started on OmniPod 5 with Dexcom G6 CGM with the following settings basal rate 1.65, carb ratio 3.0, insulin sensitivity 15, glucose target 120-140, active insulin time 2-1/2 hours    Patient has been using Dexcom G6 CGM.  Lab Results   Component Value Date/Time    LABA1C 8.4 02/22/2025 10:00 AM    LABA1C 7.7 10/22/2024 09:17 AM    LABA1C 8.4 07/06/2024 09:48 AM     Patient has had no hypoglycemic episodes   The patient has been mindful of what has been eating and was following diabetes diet as encouraged   I reviewed current medications and the patient has no issues with diabetes medications  Elbert Olivares is up to date with eye exam and denied any history of diabetic retinopathy   The patient performs  own feet care  Microvascular complications:  No Retinopathy, Nephropathy or Neuropathy   Macrovascular complications: no CAD, PVD, or Stroke  The patient refuses Flushot and pneumonia vaccine     Hypothyroidism   H/o partial thyroidectomy long time ago for MNG. Per pt pathology was benign   She is currently on Levothyhroxine 75 mcg dailty. Patient takes levothyroxine in the morning at empty stomach, wait one hour before eating , avoid multivitamins

## 2025-02-27 ENCOUNTER — OFFICE VISIT (OUTPATIENT)
Dept: FAMILY MEDICINE CLINIC | Age: 50
End: 2025-02-27
Payer: COMMERCIAL

## 2025-02-27 VITALS
RESPIRATION RATE: 17 BRPM | SYSTOLIC BLOOD PRESSURE: 136 MMHG | BODY MASS INDEX: 39.69 KG/M2 | WEIGHT: 277.2 LBS | TEMPERATURE: 98.7 F | OXYGEN SATURATION: 98 % | HEIGHT: 70 IN | HEART RATE: 98 BPM | DIASTOLIC BLOOD PRESSURE: 78 MMHG

## 2025-02-27 DIAGNOSIS — K12.0 ORAL APHTHOUS ULCER: Primary | ICD-10-CM

## 2025-02-27 PROCEDURE — G8427 DOCREV CUR MEDS BY ELIG CLIN: HCPCS

## 2025-02-27 PROCEDURE — 99213 OFFICE O/P EST LOW 20 MIN: CPT

## 2025-02-27 PROCEDURE — 1036F TOBACCO NON-USER: CPT

## 2025-02-27 PROCEDURE — G8417 CALC BMI ABV UP PARAM F/U: HCPCS

## 2025-02-27 RX ORDER — LIDOCAINE HYDROCHLORIDE 20 MG/ML
5 SOLUTION OROPHARYNGEAL PRN
Qty: 150 ML | Refills: 0 | Status: SHIPPED | OUTPATIENT
Start: 2025-02-27

## 2025-02-27 RX ORDER — CLINDAMYCIN HYDROCHLORIDE 300 MG/1
CAPSULE ORAL
COMMUNITY
Start: 2025-02-12

## 2025-02-27 NOTE — PROGRESS NOTES
Chief Complaint   throat (White blisters in throat/ roof of mouth area, this morning)      History of Present Illness   Source of history provided by:  patient.    History of Present Illness  The patient is a 49-year-old female who presents for evaluation of a sore in mouth.    She reports the onset of an oral lesion this morning.  She has no history of recent spicy food or extreme dietary changes.  She has no recent history of upper respiratory infections. She has been on intermittent antibiotic therapy for various conditions over the past 3 months and is currently taking clindamycin. She has no prior history of similar oral lesions.  Scheduled for dental procedure today which she has had ongoing issues for this tooth was presenting to office to ensure she is okay for procedure.    MEDICATIONS  clindamycin    All other ROS negative unless otherwise stated in HPI.      ROS    Unless otherwise stated in this report or unable to obtain because of the patient's clinical or mental status as evidenced by the medical record, this patients's positive and negative responses for Review of Systems, constitutional, psych, eyes, ENT, cardiovascular, respiratory, gastrointestinal, neurological, genitourinary, musculoskeletal, integument systems and systems related to the presenting problem are either stated in the preceding or were not pertinent or were negative for the symptoms and/or complaints related to the medical problem.    Physical Exam         VS:  /78   Pulse 98   Temp 98.7 °F (37.1 °C)   Resp 17   Ht 1.778 m (5' 10\")   Wt 125.7 kg (277 lb 3.2 oz)   SpO2 98%   BMI 39.77 kg/m²     Constitutional:  Alert, development consistent with age.  HEENT:  NC/NT.  Airway patent.  Eyes PERRL.  Ears with normal bilateral TMs and normal bilateral canals.    Neck:  Supple. Normal ROM.  No adenopathy.    Lips:  No erythema or abrasions noted.    Mouth:  Normal tongue and moist buccal mucosa.  There is ulcerated oral

## 2025-02-28 ENCOUNTER — OFFICE VISIT (OUTPATIENT)
Dept: PRIMARY CARE CLINIC | Age: 50
End: 2025-02-28
Payer: COMMERCIAL

## 2025-02-28 VITALS
DIASTOLIC BLOOD PRESSURE: 84 MMHG | OXYGEN SATURATION: 97 % | SYSTOLIC BLOOD PRESSURE: 135 MMHG | HEIGHT: 70 IN | TEMPERATURE: 97.7 F | WEIGHT: 278.2 LBS | RESPIRATION RATE: 16 BRPM | BODY MASS INDEX: 39.83 KG/M2 | HEART RATE: 85 BPM

## 2025-02-28 DIAGNOSIS — R30.9 PAIN PASSING URINE: ICD-10-CM

## 2025-02-28 DIAGNOSIS — B37.9 ANTIBIOTIC-INDUCED YEAST INFECTION: ICD-10-CM

## 2025-02-28 DIAGNOSIS — T36.95XA ANTIBIOTIC-INDUCED YEAST INFECTION: ICD-10-CM

## 2025-02-28 DIAGNOSIS — N30.01 ACUTE CYSTITIS WITH HEMATURIA: Primary | ICD-10-CM

## 2025-02-28 LAB
BILIRUBIN, POC: NEGATIVE
BLOOD URINE, POC: NORMAL
CLARITY, POC: NORMAL
COLOR, POC: YELLOW
GLUCOSE URINE, POC: NEGATIVE MG/DL
KETONES, POC: NORMAL MG/DL
LEUKOCYTE EST, POC: NORMAL
NITRITE, POC: NEGATIVE
PH, POC: 5.5
PROTEIN, POC: NORMAL MG/DL
SPECIFIC GRAVITY, POC: >=1.03
UROBILINOGEN, POC: 0.2 MG/DL

## 2025-02-28 PROCEDURE — 81002 URINALYSIS NONAUTO W/O SCOPE: CPT

## 2025-02-28 PROCEDURE — 1036F TOBACCO NON-USER: CPT

## 2025-02-28 PROCEDURE — G8417 CALC BMI ABV UP PARAM F/U: HCPCS

## 2025-02-28 PROCEDURE — 99213 OFFICE O/P EST LOW 20 MIN: CPT

## 2025-02-28 PROCEDURE — G8427 DOCREV CUR MEDS BY ELIG CLIN: HCPCS

## 2025-02-28 RX ORDER — FLUCONAZOLE 150 MG/1
TABLET ORAL
Qty: 2 TABLET | Refills: 0 | Status: SHIPPED | OUTPATIENT
Start: 2025-02-28

## 2025-02-28 RX ORDER — CEFDINIR 300 MG/1
300 CAPSULE ORAL EVERY 12 HOURS
Qty: 14 CAPSULE | Refills: 0 | Status: SHIPPED | OUTPATIENT
Start: 2025-02-28 | End: 2025-03-07

## 2025-02-28 RX ORDER — PHENAZOPYRIDINE HYDROCHLORIDE 100 MG/1
100 TABLET, FILM COATED ORAL EVERY 8 HOURS PRN
Qty: 9 TABLET | Refills: 0 | Status: SHIPPED | OUTPATIENT
Start: 2025-02-28 | End: 2025-03-03

## 2025-02-28 NOTE — PROGRESS NOTES
Chief Complaint:   Urinary Tract Infection (Urine frequency and pain x1 day.)      History of Present Illness   Source of history provided by:  patient.  History of Present Illness  The patient is a 49-year-old female who presents for evaluation of a urinary tract infection (UTI).    She reports experiencing dysuria, which began yesterday. She has a history of recurrent UTIs. She does not experience any associated back pain, nausea, vomiting, or fever. Additionally, she mentions that antibiotics typically result in yeast infections for her.    ALLERGIES  The patient has no known allergies.      Review of Systems   Unless otherwise stated in this report or unable to obtain because of the patient's clinical or mental status as evidenced by the medical record, this patients's positive and negative responses for Review of Systems, constitutional, psych, eyes, ENT, cardiovascular, respiratory, gastrointestinal, neurological, genitourinary, musculoskeletal, integument systems and systems related to the presenting problem are either stated in the preceding or were not pertinent or were negative for the symptoms and/or complaints related to the medical problem.    Past Medical History:  has a past medical history of Depression, Diabetes mellitus (HCC), GERD (gastroesophageal reflux disease), Joint pain, LEXIS on CPAP, and Thyroid disease.   Past Surgical History:  has a past surgical history that includes Hysterectomy; Cholecystectomy;  section; Colonoscopy (N/A, 10/27/2022); and Salpingo-oophorectomy (Left).  Social History:  reports that she has never smoked. She has never used smokeless tobacco. She reports that she does not drink alcohol and does not use drugs.  Family History: family history is not on file.  Allergies: Celecoxib    Physical Exam   Vital Signs:  /84   Pulse 85   Temp 97.7 °F (36.5 °C) (Oral)   Resp 16   Ht 1.778 m (5' 10\")   Wt 126.2 kg (278 lb 3.2 oz)   SpO2 97%   BMI 39.92 kg/m²

## 2025-03-01 LAB
CULTURE: NORMAL
SPECIMEN DESCRIPTION: NORMAL

## 2025-03-08 ENCOUNTER — OFFICE VISIT (OUTPATIENT)
Dept: FAMILY MEDICINE CLINIC | Age: 50
End: 2025-03-08
Payer: COMMERCIAL

## 2025-03-08 VITALS
OXYGEN SATURATION: 98 % | TEMPERATURE: 97.9 F | BODY MASS INDEX: 40.32 KG/M2 | SYSTOLIC BLOOD PRESSURE: 128 MMHG | DIASTOLIC BLOOD PRESSURE: 70 MMHG | HEART RATE: 84 BPM | WEIGHT: 281 LBS

## 2025-03-08 DIAGNOSIS — L03.211 CELLULITIS, FACE: Primary | ICD-10-CM

## 2025-03-08 PROCEDURE — 99213 OFFICE O/P EST LOW 20 MIN: CPT | Performed by: FAMILY MEDICINE

## 2025-03-08 PROCEDURE — G8417 CALC BMI ABV UP PARAM F/U: HCPCS | Performed by: FAMILY MEDICINE

## 2025-03-08 PROCEDURE — G8427 DOCREV CUR MEDS BY ELIG CLIN: HCPCS | Performed by: FAMILY MEDICINE

## 2025-03-08 PROCEDURE — 1036F TOBACCO NON-USER: CPT | Performed by: FAMILY MEDICINE

## 2025-03-08 RX ORDER — CEFDINIR 300 MG/1
300 CAPSULE ORAL EVERY 12 HOURS
Qty: 20 CAPSULE | Refills: 0 | Status: SHIPPED | OUTPATIENT
Start: 2025-03-08 | End: 2025-03-18

## 2025-03-08 NOTE — PROGRESS NOTES
Elbert Olivares (:  1975) is a 49 y.o. female,Established patient, here for evaluation of the following chief complaint(s):  Dental Pain (Had a tooth pulled last Thursday /Having facial swelling and pain )         Assessment & Plan  Cellulitis, face    Cefdnir 10 days and finish the last 4 to 6 pills she has at home, see dentist next week, motirn and tylenol every 4hrs, hopefully the antibitoic will help the pain in the next 24 hours, heat as needed    Orders:    cefdinir (OMNICEF) 300 MG capsule; Take 1 capsule by mouth every 12 hours for 10 days      Return if symptoms worsen or fail to improve.       Subjective   Here with left sided face swellign and signifcant tooth pain, she is taking nazario and tylenol every 4hours  Called dentist and has an appt next week    No fever, trouble eating due to pain        Review of Systems   Constitutional:  Positive for appetite change. Negative for chills and fever.   HENT:          Mouth pain an d swelling   Neurological:  Positive for headaches.          Objective   Physical Exam  Vitals and nursing note reviewed.   Constitutional:       General: She is not in acute distress.     Appearance: She is not toxic-appearing.   HENT:      Head: Normocephalic and atraumatic.      Right Ear: Tympanic membrane normal.      Left Ear: Tympanic membrane normal.      Nose: No congestion.      Mouth/Throat:      Mouth: Mucous membranes are moist.      Comments: Left sided swelling due to tooth, gums red and swelleing upper jaw  Neurological:      Mental Status: She is alert.                  An electronic signature was used to authenticate this note.    --Sia Cates DO

## 2025-03-19 ENCOUNTER — OFFICE VISIT (OUTPATIENT)
Dept: FAMILY MEDICINE CLINIC | Age: 50
End: 2025-03-19
Payer: COMMERCIAL

## 2025-03-19 ENCOUNTER — RESULTS FOLLOW-UP (OUTPATIENT)
Dept: FAMILY MEDICINE CLINIC | Age: 50
End: 2025-03-19

## 2025-03-19 VITALS
BODY MASS INDEX: 39.86 KG/M2 | TEMPERATURE: 98.4 F | DIASTOLIC BLOOD PRESSURE: 78 MMHG | SYSTOLIC BLOOD PRESSURE: 130 MMHG | HEIGHT: 70 IN | OXYGEN SATURATION: 98 % | RESPIRATION RATE: 22 BRPM | WEIGHT: 278.4 LBS | HEART RATE: 82 BPM

## 2025-03-19 DIAGNOSIS — R05.9 COUGH, UNSPECIFIED TYPE: Primary | ICD-10-CM

## 2025-03-19 DIAGNOSIS — J20.8 ACUTE BRONCHITIS DUE TO OTHER SPECIFIED ORGANISMS: ICD-10-CM

## 2025-03-19 LAB
INFLUENZA A ANTIBODY: NEGATIVE
INFLUENZA B ANTIBODY: NEGATIVE
Lab: NORMAL
PERFORMING INSTRUMENT: NORMAL
QC PASS/FAIL: NORMAL
SARS-COV-2, POC: NORMAL

## 2025-03-19 PROCEDURE — 99214 OFFICE O/P EST MOD 30 MIN: CPT | Performed by: FAMILY MEDICINE

## 2025-03-19 PROCEDURE — 1036F TOBACCO NON-USER: CPT | Performed by: FAMILY MEDICINE

## 2025-03-19 PROCEDURE — G8417 CALC BMI ABV UP PARAM F/U: HCPCS | Performed by: FAMILY MEDICINE

## 2025-03-19 PROCEDURE — 87426 SARSCOV CORONAVIRUS AG IA: CPT | Performed by: FAMILY MEDICINE

## 2025-03-19 PROCEDURE — 87804 INFLUENZA ASSAY W/OPTIC: CPT | Performed by: FAMILY MEDICINE

## 2025-03-19 PROCEDURE — G8427 DOCREV CUR MEDS BY ELIG CLIN: HCPCS | Performed by: FAMILY MEDICINE

## 2025-03-19 RX ORDER — ALBUTEROL SULFATE 90 UG/1
2 INHALANT RESPIRATORY (INHALATION) 4 TIMES DAILY PRN
Qty: 18 G | Refills: 0 | Status: SHIPPED | OUTPATIENT
Start: 2025-03-19

## 2025-03-19 ASSESSMENT — ENCOUNTER SYMPTOMS
PHOTOPHOBIA: 0
EYE REDNESS: 0
BLOOD IN STOOL: 0
SINUS PAIN: 0
COUGH: 1
SHORTNESS OF BREATH: 0
VOMITING: 0
BACK PAIN: 0
ABDOMINAL PAIN: 0
ALLERGIC/IMMUNOLOGIC NEGATIVE: 1
TROUBLE SWALLOWING: 0
EYE DISCHARGE: 0
EYE PAIN: 0
SINUS PRESSURE: 1
CHEST TIGHTNESS: 0
SORE THROAT: 1
DIARRHEA: 0
NAUSEA: 0

## 2025-03-19 NOTE — PROGRESS NOTES
3/19/25  Elbert Olivares : 1975 Sex: female  Age: 49 y.o.      Assessment and Plan:  Elebrt \"DUTCH" was seen today for cough and congestion.    Diagnoses and all orders for this visit:    Cough, unspecified type  -     POCT COVID-19, Antigen  -     POCT Influenza A/B  -     XR CHEST (2 VW); Future  -     amoxicillin-clavulanate (AUGMENTIN) 875-125 MG per tablet; Take 1 tablet by mouth 2 times daily for 10 days  -     albuterol sulfate HFA (VENTOLIN HFA) 108 (90 Base) MCG/ACT inhaler; Inhale 2 puffs into the lungs 4 times daily as needed for Wheezing    Acute bronchitis due to other specified organisms  -     XR CHEST (2 VW); Future  -     amoxicillin-clavulanate (AUGMENTIN) 875-125 MG per tablet; Take 1 tablet by mouth 2 times daily for 10 days  -     albuterol sulfate HFA (VENTOLIN HFA) 108 (90 Base) MCG/ACT inhaler; Inhale 2 puffs into the lungs 4 times daily as needed for Wheezing    Rapid antigen testing for COVID and influenza negative.  Chest x-ray looked clear by my reading, final disposition per radiology report.  Will treat her bronchitis with Augmentin for 10 days, albuterol metered-dose inhaler 2 puffs up to 4 times a day for cough and wheezing.  Symptomatic treatment can include Tylenol, fluids, rest, Mucinex, Claritin, coolmist.  She has been on a number of antibiotic courses since the beginning of the year.  If not improving, will need recheck for possible inflammatory etiology.    No follow-ups on file.    Chief Complaint   Patient presents with    Cough     Started 3 days ago    Congestion       Congestion, pressure, drainage, facial tenderness, mild headache, productive cough, onset 4 days ago.  Denies fever, chills, diaphoresis, nausea, vomiting, decreased oral intake. Denies other GI or  complaints.   OTC treatments minimally effective.          Review of Systems   Constitutional:  Negative for appetite change, fatigue and unexpected weight change.   HENT:  Positive for congestion,

## 2025-03-22 ENCOUNTER — HOSPITAL ENCOUNTER (EMERGENCY)
Age: 50
Discharge: HOME | End: 2025-03-22
Payer: COMMERCIAL

## 2025-03-22 ENCOUNTER — APPOINTMENT (OUTPATIENT)
Dept: GENERAL RADIOLOGY | Age: 50
End: 2025-03-22
Payer: COMMERCIAL

## 2025-03-22 VITALS
RESPIRATION RATE: 16 BRPM | TEMPERATURE: 98.8 F | DIASTOLIC BLOOD PRESSURE: 88 MMHG | SYSTOLIC BLOOD PRESSURE: 149 MMHG | WEIGHT: 275 LBS | OXYGEN SATURATION: 96 % | HEART RATE: 87 BPM | BODY MASS INDEX: 39.37 KG/M2 | HEIGHT: 70 IN

## 2025-03-22 DIAGNOSIS — H65.92 MIDDLE EAR EFFUSION, LEFT: Primary | ICD-10-CM

## 2025-03-22 DIAGNOSIS — J20.9 ACUTE BRONCHITIS, UNSPECIFIED ORGANISM: ICD-10-CM

## 2025-03-22 LAB
INFLUENZA A BY PCR: NOT DETECTED
INFLUENZA B BY PCR: NOT DETECTED
SARS-COV-2 RDRP RESP QL NAA+PROBE: NOT DETECTED
SPECIMEN DESCRIPTION: NORMAL

## 2025-03-22 PROCEDURE — 99284 EMERGENCY DEPT VISIT MOD MDM: CPT

## 2025-03-22 PROCEDURE — 87635 SARS-COV-2 COVID-19 AMP PRB: CPT

## 2025-03-22 PROCEDURE — 87502 INFLUENZA DNA AMP PROBE: CPT

## 2025-03-22 PROCEDURE — 71046 X-RAY EXAM CHEST 2 VIEWS: CPT

## 2025-03-22 RX ORDER — BENZONATATE 100 MG/1
100 CAPSULE ORAL 3 TIMES DAILY PRN
Qty: 21 CAPSULE | Refills: 0 | Status: SHIPPED | OUTPATIENT
Start: 2025-03-22 | End: 2025-03-29

## 2025-03-22 RX ORDER — FLUTICASONE PROPIONATE 50 MCG
2 SPRAY, SUSPENSION (ML) NASAL DAILY
Qty: 16 G | Refills: 0 | Status: SHIPPED | OUTPATIENT
Start: 2025-03-22

## 2025-03-22 ASSESSMENT — PAIN DESCRIPTION - DESCRIPTORS: DESCRIPTORS: ACHING

## 2025-03-22 ASSESSMENT — PAIN DESCRIPTION - LOCATION: LOCATION: HEAD

## 2025-03-22 ASSESSMENT — PAIN - FUNCTIONAL ASSESSMENT: PAIN_FUNCTIONAL_ASSESSMENT: 0-10

## 2025-03-22 ASSESSMENT — PAIN SCALES - GENERAL: PAINLEVEL_OUTOF10: 3

## 2025-03-23 NOTE — ED PROVIDER NOTES
and discussed today's results, in addition to providing specific details for the plan of care and counseling regarding the diagnosis and prognosis.  Loratadine pseudoephedrine, Flonase, Tessalon Perles prescribed and electronically sent to patient's pharmacy.  Patient instructed to follow-up with PCP.  Patient advised to return to ED if any new or worsening symptoms.  Patients questions were answered at this time and they were agreeable with plan.    I am the Primary Clinician of Record.    FINAL IMPRESSION      1. Middle ear effusion, left    2. Acute bronchitis, unspecified organism          DISPOSITION/PLAN     DISPOSITION Decision To Discharge 03/22/2025 10:52:12 PM   DISPOSITION CONDITION Stable           PATIENT REFERRED TO:  Thierno Vargas MD  [address] [facility] [address]  [phone]    Schedule an appointment as soon as possible for a visit       [facility] Emergency Department  [address] [facility] [address]  [phone]    If new or worsening symptoms      DISCHARGE MEDICATIONS:  Discharge Medication List as of 3/22/2025 10:53 PM        START taking these medications    Details   loratadine-pseudoephedrine (CLARITIN-D 12HR) 5-120 MG per extended release tablet Take 1 tablet by mouth 2 times daily for 7 days, Disp-14 tablet, R-0Normal      fluticasone (FLONASE) 50 MCG/ACT nasal spray 2 sprays by Each Nostril route daily, Disp-16 g, R-0Normal      benzonatate (TESSALON PERLES) 100 MG capsule Take 1 capsule by mouth 3 times daily as needed for Cough, Disp-21 capsule, R-0Normal             DISCONTINUED MEDICATIONS:  Discharge Medication List as of 3/22/2025 10:53 PM        STOP taking these medications       loratadine (CLARITIN) 10 MG tablet Comments:   Reason for Stopping:                      (Please note that portions of this note were completed with a voice recognition program.  Efforts were made to edit the dictations but occasionally words are

## 2025-04-10 DIAGNOSIS — J20.8 ACUTE BRONCHITIS DUE TO OTHER SPECIFIED ORGANISMS: ICD-10-CM

## 2025-04-10 DIAGNOSIS — R05.9 COUGH, UNSPECIFIED TYPE: ICD-10-CM

## 2025-04-10 RX ORDER — ALBUTEROL SULFATE 90 UG/1
2 INHALANT RESPIRATORY (INHALATION) 4 TIMES DAILY PRN
Qty: 18 EACH | OUTPATIENT
Start: 2025-04-10

## 2025-05-16 ENCOUNTER — OFFICE VISIT (OUTPATIENT)
Dept: FAMILY MEDICINE CLINIC | Age: 50
End: 2025-05-16
Payer: COMMERCIAL

## 2025-05-16 VITALS
WEIGHT: 270 LBS | TEMPERATURE: 96.8 F | BODY MASS INDEX: 38.65 KG/M2 | DIASTOLIC BLOOD PRESSURE: 78 MMHG | HEIGHT: 70 IN | HEART RATE: 87 BPM | OXYGEN SATURATION: 97 % | RESPIRATION RATE: 16 BRPM | SYSTOLIC BLOOD PRESSURE: 128 MMHG

## 2025-05-16 DIAGNOSIS — R30.0 DYSURIA: Primary | ICD-10-CM

## 2025-05-16 LAB
BILIRUBIN, POC: NORMAL
BLOOD URINE, POC: NORMAL
CLARITY, POC: CLEAR
COLOR, POC: YELLOW
GLUCOSE URINE, POC: 100 MG/DL
KETONES, POC: NORMAL MG/DL
LEUKOCYTE EST, POC: NORMAL
NITRITE, POC: NORMAL
PH, POC: 6
PROTEIN, POC: NORMAL MG/DL
SPECIFIC GRAVITY, POC: 1.02
UROBILINOGEN, POC: 0.2 MG/DL

## 2025-05-16 PROCEDURE — G8427 DOCREV CUR MEDS BY ELIG CLIN: HCPCS | Performed by: FAMILY MEDICINE

## 2025-05-16 PROCEDURE — 1036F TOBACCO NON-USER: CPT | Performed by: FAMILY MEDICINE

## 2025-05-16 PROCEDURE — G8417 CALC BMI ABV UP PARAM F/U: HCPCS | Performed by: FAMILY MEDICINE

## 2025-05-16 PROCEDURE — 81002 URINALYSIS NONAUTO W/O SCOPE: CPT | Performed by: FAMILY MEDICINE

## 2025-05-16 PROCEDURE — 99213 OFFICE O/P EST LOW 20 MIN: CPT | Performed by: FAMILY MEDICINE

## 2025-05-16 RX ORDER — PHENAZOPYRIDINE HYDROCHLORIDE 200 MG/1
200 TABLET, FILM COATED ORAL 3 TIMES DAILY PRN
Qty: 9 TABLET | Refills: 0 | Status: SHIPPED | OUTPATIENT
Start: 2025-05-16 | End: 2025-05-19

## 2025-05-16 RX ORDER — FLUCONAZOLE 150 MG/1
150 TABLET ORAL
Qty: 2 TABLET | Refills: 0 | Status: SHIPPED | OUTPATIENT
Start: 2025-05-16 | End: 2025-05-22

## 2025-05-16 RX ORDER — CEFDINIR 300 MG/1
300 CAPSULE ORAL 2 TIMES DAILY
Qty: 14 CAPSULE | Refills: 0 | Status: SHIPPED | OUTPATIENT
Start: 2025-05-16 | End: 2025-05-23

## 2025-05-16 ASSESSMENT — ENCOUNTER SYMPTOMS
BACK PAIN: 0
ABDOMINAL PAIN: 0
RESPIRATORY NEGATIVE: 1

## 2025-05-16 NOTE — PROGRESS NOTES
Normocephalic and atraumatic.   Eyes:      Conjunctiva/sclera: Conjunctivae normal.      Pupils: Pupils are equal, round, and reactive to light.   Cardiovascular:      Rate and Rhythm: Normal rate and regular rhythm.      Heart sounds: Normal heart sounds.   Pulmonary:      Effort: Pulmonary effort is normal.      Breath sounds: Normal breath sounds.   Abdominal:      General: Bowel sounds are normal.      Palpations: Abdomen is soft.      Tenderness: There is no abdominal tenderness. There is no right CVA tenderness or left CVA tenderness.   Skin:     General: Skin is warm and dry.   Neurological:      Mental Status: She is alert and oriented to person, place, and time.   Psychiatric:         Behavior: Behavior normal.         Judgment: Judgment normal.                  An electronic signature was used to authenticate this note.    --Casimiro Meyer, DO

## 2025-05-18 LAB
CULTURE: NORMAL
CULTURE: NORMAL
SPECIMEN DESCRIPTION: NORMAL

## 2025-05-19 ENCOUNTER — RESULTS FOLLOW-UP (OUTPATIENT)
Dept: FAMILY MEDICINE CLINIC | Age: 50
End: 2025-05-19

## 2025-06-02 DIAGNOSIS — E11.9 TYPE 2 DIABETES MELLITUS WITHOUT COMPLICATION, WITH LONG-TERM CURRENT USE OF INSULIN (HCC): ICD-10-CM

## 2025-06-02 DIAGNOSIS — Z79.4 TYPE 2 DIABETES MELLITUS WITHOUT COMPLICATION, WITH LONG-TERM CURRENT USE OF INSULIN (HCC): ICD-10-CM

## 2025-06-22 ENCOUNTER — OFFICE VISIT (OUTPATIENT)
Dept: FAMILY MEDICINE CLINIC | Age: 50
End: 2025-06-22
Payer: COMMERCIAL

## 2025-06-22 VITALS
BODY MASS INDEX: 38.37 KG/M2 | SYSTOLIC BLOOD PRESSURE: 122 MMHG | HEART RATE: 102 BPM | HEIGHT: 70 IN | TEMPERATURE: 98.8 F | WEIGHT: 268 LBS | OXYGEN SATURATION: 95 % | DIASTOLIC BLOOD PRESSURE: 84 MMHG

## 2025-06-22 DIAGNOSIS — R10.9 FLANK PAIN: ICD-10-CM

## 2025-06-22 DIAGNOSIS — N30.00 ACUTE CYSTITIS WITHOUT HEMATURIA: Primary | ICD-10-CM

## 2025-06-22 DIAGNOSIS — Z76.0 MEDICATION REFILL: ICD-10-CM

## 2025-06-22 LAB
BILIRUBIN, POC: NORMAL
BLOOD URINE, POC: NORMAL
CLARITY, POC: NORMAL
COLOR, POC: YELLOW
GLUCOSE URINE, POC: NORMAL MG/DL
KETONES, POC: NORMAL MG/DL
LEUKOCYTE EST, POC: NORMAL
NITRITE, POC: POSITIVE
PH, POC: 5.5
PROTEIN, POC: NORMAL MG/DL
SPECIFIC GRAVITY, POC: 1.02
UROBILINOGEN, POC: 0.2 MG/DL

## 2025-06-22 PROCEDURE — 99213 OFFICE O/P EST LOW 20 MIN: CPT | Performed by: STUDENT IN AN ORGANIZED HEALTH CARE EDUCATION/TRAINING PROGRAM

## 2025-06-22 PROCEDURE — 1036F TOBACCO NON-USER: CPT | Performed by: STUDENT IN AN ORGANIZED HEALTH CARE EDUCATION/TRAINING PROGRAM

## 2025-06-22 PROCEDURE — 81002 URINALYSIS NONAUTO W/O SCOPE: CPT | Performed by: STUDENT IN AN ORGANIZED HEALTH CARE EDUCATION/TRAINING PROGRAM

## 2025-06-22 PROCEDURE — G8417 CALC BMI ABV UP PARAM F/U: HCPCS | Performed by: STUDENT IN AN ORGANIZED HEALTH CARE EDUCATION/TRAINING PROGRAM

## 2025-06-22 PROCEDURE — G8427 DOCREV CUR MEDS BY ELIG CLIN: HCPCS | Performed by: STUDENT IN AN ORGANIZED HEALTH CARE EDUCATION/TRAINING PROGRAM

## 2025-06-22 RX ORDER — FLUCONAZOLE 150 MG/1
150 TABLET ORAL
Qty: 2 TABLET | Refills: 0 | Status: SHIPPED | OUTPATIENT
Start: 2025-06-22 | End: 2025-06-28

## 2025-06-22 RX ORDER — CIPROFLOXACIN 500 MG/1
500 TABLET, FILM COATED ORAL 2 TIMES DAILY
Qty: 14 TABLET | Refills: 0 | Status: SHIPPED | OUTPATIENT
Start: 2025-06-22 | End: 2025-06-29

## 2025-06-24 LAB
CULTURE: ABNORMAL
SPECIMEN DESCRIPTION: ABNORMAL

## 2025-06-25 ENCOUNTER — OFFICE VISIT (OUTPATIENT)
Dept: ENDOCRINOLOGY | Age: 50
End: 2025-06-25
Payer: COMMERCIAL

## 2025-06-25 VITALS
HEART RATE: 88 BPM | TEMPERATURE: 97.7 F | WEIGHT: 271 LBS | HEIGHT: 70 IN | OXYGEN SATURATION: 97 % | SYSTOLIC BLOOD PRESSURE: 123 MMHG | DIASTOLIC BLOOD PRESSURE: 78 MMHG | RESPIRATION RATE: 18 BRPM | BODY MASS INDEX: 38.8 KG/M2

## 2025-06-25 DIAGNOSIS — E11.9 INSULIN DEPENDENT TYPE 2 DIABETES MELLITUS (HCC): ICD-10-CM

## 2025-06-25 DIAGNOSIS — Z96.41 INSULIN PUMP IN PLACE: ICD-10-CM

## 2025-06-25 DIAGNOSIS — E03.9 HYPOTHYROIDISM, UNSPECIFIED TYPE: ICD-10-CM

## 2025-06-25 DIAGNOSIS — Z79.4 INSULIN DEPENDENT TYPE 2 DIABETES MELLITUS (HCC): ICD-10-CM

## 2025-06-25 DIAGNOSIS — E11.9 TYPE 2 DIABETES MELLITUS WITHOUT COMPLICATION, WITH LONG-TERM CURRENT USE OF INSULIN (HCC): Primary | ICD-10-CM

## 2025-06-25 DIAGNOSIS — Z79.4 TYPE 2 DIABETES MELLITUS WITHOUT COMPLICATION, WITH LONG-TERM CURRENT USE OF INSULIN (HCC): Primary | ICD-10-CM

## 2025-06-25 LAB — HBA1C MFR BLD: 7.7 %

## 2025-06-25 PROCEDURE — 99214 OFFICE O/P EST MOD 30 MIN: CPT | Performed by: NURSE PRACTITIONER

## 2025-06-25 PROCEDURE — 83036 HEMOGLOBIN GLYCOSYLATED A1C: CPT | Performed by: NURSE PRACTITIONER

## 2025-06-25 PROCEDURE — 1036F TOBACCO NON-USER: CPT | Performed by: NURSE PRACTITIONER

## 2025-06-25 PROCEDURE — 3051F HG A1C>EQUAL 7.0%<8.0%: CPT | Performed by: NURSE PRACTITIONER

## 2025-06-25 PROCEDURE — 2022F DILAT RTA XM EVC RTNOPTHY: CPT | Performed by: NURSE PRACTITIONER

## 2025-06-25 PROCEDURE — G8417 CALC BMI ABV UP PARAM F/U: HCPCS | Performed by: NURSE PRACTITIONER

## 2025-06-25 PROCEDURE — G8427 DOCREV CUR MEDS BY ELIG CLIN: HCPCS | Performed by: NURSE PRACTITIONER

## 2025-06-25 RX ORDER — INSULIN PMP CART,AUT,G6/7,CNTR
EACH SUBCUTANEOUS
Qty: 45 EACH | Refills: 3 | Status: SHIPPED | OUTPATIENT
Start: 2025-06-25

## 2025-06-25 RX ORDER — ACYCLOVIR 400 MG/1
TABLET ORAL
Qty: 9 EACH | Refills: 3 | Status: SHIPPED | OUTPATIENT
Start: 2025-06-25 | End: 2025-06-26 | Stop reason: SDUPTHER

## 2025-06-25 NOTE — PROGRESS NOTES
[facility]  [facility] Department of Endocrinology Diabetes and Metabolism   [address], [place] [id number]  Phone: [phone]  Fax: [phone]    Date of Service: 6/25/2025  Primary Care Physician: Thierno Vargas MD  Provider:DEYANIRA Vora NP      Reason for the visit:  DM type 2, Hypothyroidism, MNG     History of Present Illness:  The history is provided by the patient. No  was used. Accuracy of the patient data is excellent.  Elbert Olivares is a very pleasant 49 y.o. female seen today for diabetes management     Elbert Olivares was diagnosed with diabetes at age 39 and currently on metformin extended release 500 mg 2 tablets twice daily, glipizide extended release 10 mg twice daily    Jardiance caused yeast infections    The patient was recently started on OmniPod 5 with Dexcom G6 CGM with the following settings :    basal rate 1.8, carb ratio 3.0, insulin sensitivity 15, glucose target 110-110, AIT 2    TIR 53%  Hyperglycemia 48%  Lows 1%    Avg BS  184    TDD  63.7 units     Patient has been using Dexcom G6 CGM.  Lab Results   Component Value Date/Time    LABA1C 7.7 06/25/2025 03:02 PM    LABA1C 8.4 02/22/2025 10:00 AM    LABA1C 7.7 10/22/2024 09:17 AM     Patient has had no hypoglycemic episodes   The patient has been mindful of what has been eating and was following diabetes diet as encouraged   I reviewed current medications and the patient has no issues with diabetes medications  Elbert Olivares is up to date with eye exam and denied any history of diabetic retinopathy   The patient performs  own feet care  Microvascular complications:  No Retinopathy, Nephropathy or Neuropathy   Macrovascular complications: no CAD, PVD, or Stroke  The patient refuses Flushot and pneumonia vaccine       Hypothyroidism   H/o partial thyroidectomy long time ago for MNG. Per pt pathology was benign   She is currently on Levothyhroxine 75 mcg dailty.

## 2025-06-26 DIAGNOSIS — E11.9 TYPE 2 DIABETES MELLITUS WITHOUT COMPLICATION, WITH LONG-TERM CURRENT USE OF INSULIN (HCC): ICD-10-CM

## 2025-06-26 DIAGNOSIS — Z79.4 TYPE 2 DIABETES MELLITUS WITHOUT COMPLICATION, WITH LONG-TERM CURRENT USE OF INSULIN (HCC): ICD-10-CM

## 2025-06-26 RX ORDER — ACYCLOVIR 400 MG/1
TABLET ORAL
Qty: 9 EACH | Refills: 3 | Status: SHIPPED | OUTPATIENT
Start: 2025-06-26

## 2025-06-26 RX ORDER — GLIPIZIDE 10 MG/1
TABLET, FILM COATED, EXTENDED RELEASE ORAL
Refills: 0 | OUTPATIENT
Start: 2025-06-26

## 2025-06-26 RX ORDER — METFORMIN HYDROCHLORIDE 500 MG/1
TABLET, EXTENDED RELEASE ORAL
Refills: 0 | OUTPATIENT
Start: 2025-06-26

## 2025-08-22 ENCOUNTER — OFFICE VISIT (OUTPATIENT)
Dept: PRIMARY CARE CLINIC | Age: 50
End: 2025-08-22
Payer: COMMERCIAL

## 2025-08-22 VITALS
TEMPERATURE: 97.5 F | SYSTOLIC BLOOD PRESSURE: 128 MMHG | BODY MASS INDEX: 37.65 KG/M2 | HEIGHT: 70 IN | DIASTOLIC BLOOD PRESSURE: 74 MMHG | HEART RATE: 86 BPM | OXYGEN SATURATION: 99 % | WEIGHT: 263 LBS

## 2025-08-22 DIAGNOSIS — M25.551 RIGHT HIP PAIN: ICD-10-CM

## 2025-08-22 DIAGNOSIS — E11.65 TYPE 2 DIABETES MELLITUS WITH HYPERGLYCEMIA, WITHOUT LONG-TERM CURRENT USE OF INSULIN (HCC): ICD-10-CM

## 2025-08-22 DIAGNOSIS — E78.2 MIXED HYPERLIPIDEMIA: ICD-10-CM

## 2025-08-22 DIAGNOSIS — N39.0 URINARY TRACT INFECTION WITHOUT HEMATURIA, SITE UNSPECIFIED: Primary | ICD-10-CM

## 2025-08-22 LAB
BILIRUBIN, POC: NEGATIVE
BLOOD URINE, POC: NEGATIVE
CLARITY, POC: ABNORMAL
COLOR, POC: YELLOW
GLUCOSE URINE, POC: 100 MG/DL
KETONES, POC: NEGATIVE MG/DL
LEUKOCYTE EST, POC: ABNORMAL
NITRITE, POC: POSITIVE
PH, POC: 6
PROTEIN, POC: NEGATIVE MG/DL
SPECIFIC GRAVITY, POC: 1.02
UROBILINOGEN, POC: 0.2 MG/DL

## 2025-08-22 PROCEDURE — G8427 DOCREV CUR MEDS BY ELIG CLIN: HCPCS | Performed by: FAMILY MEDICINE

## 2025-08-22 PROCEDURE — 99214 OFFICE O/P EST MOD 30 MIN: CPT | Performed by: FAMILY MEDICINE

## 2025-08-22 PROCEDURE — 1036F TOBACCO NON-USER: CPT | Performed by: FAMILY MEDICINE

## 2025-08-22 PROCEDURE — G8417 CALC BMI ABV UP PARAM F/U: HCPCS | Performed by: FAMILY MEDICINE

## 2025-08-22 PROCEDURE — 3051F HG A1C>EQUAL 7.0%<8.0%: CPT | Performed by: FAMILY MEDICINE

## 2025-08-22 PROCEDURE — 81002 URINALYSIS NONAUTO W/O SCOPE: CPT | Performed by: FAMILY MEDICINE

## 2025-08-22 PROCEDURE — 2022F DILAT RTA XM EVC RTNOPTHY: CPT | Performed by: FAMILY MEDICINE

## 2025-08-22 RX ORDER — CEPHALEXIN 500 MG/1
500 CAPSULE ORAL 2 TIMES DAILY
Qty: 14 CAPSULE | Refills: 0 | Status: SHIPPED | OUTPATIENT
Start: 2025-08-22 | End: 2025-08-29

## 2025-08-24 LAB
CULTURE: ABNORMAL
SPECIMEN DESCRIPTION: ABNORMAL

## 2025-09-04 ENCOUNTER — OFFICE VISIT (OUTPATIENT)
Dept: PRIMARY CARE CLINIC | Age: 50
End: 2025-09-04
Payer: COMMERCIAL

## 2025-09-04 VITALS
OXYGEN SATURATION: 98 % | HEART RATE: 85 BPM | TEMPERATURE: 97.7 F | SYSTOLIC BLOOD PRESSURE: 118 MMHG | DIASTOLIC BLOOD PRESSURE: 70 MMHG

## 2025-09-04 DIAGNOSIS — E11.65 TYPE 2 DIABETES MELLITUS WITH HYPERGLYCEMIA, WITHOUT LONG-TERM CURRENT USE OF INSULIN (HCC): Primary | ICD-10-CM

## 2025-09-04 DIAGNOSIS — J06.9 ACUTE UPPER RESPIRATORY INFECTION, UNSPECIFIED: ICD-10-CM

## 2025-09-04 DIAGNOSIS — M25.551 RIGHT HIP PAIN: ICD-10-CM

## 2025-09-04 DIAGNOSIS — J30.89 ALLERGIC RHINITIS DUE TO OTHER ALLERGIC TRIGGER, UNSPECIFIED SEASONALITY: ICD-10-CM

## 2025-09-04 DIAGNOSIS — E55.9 VITAMIN D DEFICIENCY: ICD-10-CM

## 2025-09-04 DIAGNOSIS — E78.2 MIXED HYPERLIPIDEMIA: ICD-10-CM

## 2025-09-04 DIAGNOSIS — N39.0 URINARY TRACT INFECTION WITHOUT HEMATURIA, SITE UNSPECIFIED: ICD-10-CM

## 2025-09-04 LAB
BILIRUBIN, POC: NEGATIVE
BLOOD URINE, POC: NEGATIVE
CLARITY, POC: CLEAR
COLOR, POC: YELLOW
GLUCOSE URINE, POC: NEGATIVE MG/DL
KETONES, POC: NEGATIVE MG/DL
LEUKOCYTE EST, POC: ABNORMAL
NITRITE, POC: NEGATIVE
PH, POC: 6
PROTEIN, POC: NEGATIVE MG/DL
SPECIFIC GRAVITY, POC: 1.02
UROBILINOGEN, POC: 0.2 MG/DL

## 2025-09-04 PROCEDURE — G8417 CALC BMI ABV UP PARAM F/U: HCPCS | Performed by: FAMILY MEDICINE

## 2025-09-04 PROCEDURE — 2022F DILAT RTA XM EVC RTNOPTHY: CPT | Performed by: FAMILY MEDICINE

## 2025-09-04 PROCEDURE — 1036F TOBACCO NON-USER: CPT | Performed by: FAMILY MEDICINE

## 2025-09-04 PROCEDURE — 99214 OFFICE O/P EST MOD 30 MIN: CPT | Performed by: FAMILY MEDICINE

## 2025-09-04 PROCEDURE — G8427 DOCREV CUR MEDS BY ELIG CLIN: HCPCS | Performed by: FAMILY MEDICINE

## 2025-09-04 PROCEDURE — 81002 URINALYSIS NONAUTO W/O SCOPE: CPT | Performed by: FAMILY MEDICINE

## 2025-09-04 PROCEDURE — 3051F HG A1C>EQUAL 7.0%<8.0%: CPT | Performed by: FAMILY MEDICINE

## 2025-09-04 RX ORDER — CEFDINIR 300 MG/1
300 CAPSULE ORAL 2 TIMES DAILY
Qty: 20 CAPSULE | Refills: 0 | Status: SHIPPED | OUTPATIENT
Start: 2025-09-04 | End: 2025-09-14

## 2025-09-04 RX ORDER — CEFDINIR 300 MG/1
300 CAPSULE ORAL 2 TIMES DAILY
Qty: 20 CAPSULE | Refills: 0 | Status: SHIPPED | OUTPATIENT
Start: 2025-09-04 | End: 2025-09-04

## (undated) DEVICE — SPONGE GZ W4XL4IN RAYON POLY FILL CVR W/ NONWOVEN FAB

## (undated) DEVICE — GRADUATE TRIANG MEASURE 1000ML BLK PRNT